# Patient Record
Sex: FEMALE | Race: WHITE | NOT HISPANIC OR LATINO | Employment: UNEMPLOYED | URBAN - METROPOLITAN AREA
[De-identification: names, ages, dates, MRNs, and addresses within clinical notes are randomized per-mention and may not be internally consistent; named-entity substitution may affect disease eponyms.]

---

## 2020-08-25 ENCOUNTER — HOSPITAL ENCOUNTER (EMERGENCY)
Facility: HOSPITAL | Age: 30
Discharge: HOME | End: 2020-08-25
Attending: EMERGENCY MEDICINE | Admitting: EMERGENCY MEDICINE
Payer: COMMERCIAL

## 2020-08-25 VITALS
TEMPERATURE: 97.9 F | DIASTOLIC BLOOD PRESSURE: 80 MMHG | SYSTOLIC BLOOD PRESSURE: 138 MMHG | RESPIRATION RATE: 18 BRPM | HEART RATE: 90 BPM | WEIGHT: 130 LBS | OXYGEN SATURATION: 100 % | BODY MASS INDEX: 21.63 KG/M2

## 2020-08-25 DIAGNOSIS — Z63.0 PARTNER RELATIONAL PROBLEM: Primary | ICD-10-CM

## 2020-08-25 DIAGNOSIS — F43.10 PTSD (POST-TRAUMATIC STRESS DISORDER): ICD-10-CM

## 2020-08-25 PROCEDURE — 99285 EMERGENCY DEPT VISIT HI MDM: CPT

## 2020-08-25 PROCEDURE — 99285 EMERGENCY DEPT VISIT HI MDM: CPT | Performed by: EMERGENCY MEDICINE

## 2020-08-25 SDOH — SOCIAL STABILITY - SOCIAL INSECURITY: PROBLEMS IN RELATIONSHIP WITH SPOUSE OR PARTNER: Z63.0
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
## 2020-08-25 NOTE — ED NOTES
[place] Alex, in the room to eval pt  Pt states that she told her sister she wanted to get a new job and move far away from her family  Pt denies SI current and denies SI hx  Hx of PTSD which she has been hospitalized for  Pt calm and cooperative  Changed into [facility] appropriate clothing and belongings removed from room  Pt provided urine sample and given bottle of water       Larissa Joshi RN  08/25/20 1925

## 2020-08-25 NOTE — ED NOTES
Jame King, getting collateral  1:1 and safety checks initiated        Vibha Hickey RN  08/25/20 1932
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
## 2020-08-26 NOTE — ED NOTES
Pt's  escorted back to the bedside  Pt remains on 1:1 watch       Svitlana Albrecht, TIFFANY  08/25/20 2014

## 2021-05-22 ENCOUNTER — HOSPITAL ENCOUNTER (EMERGENCY)
Facility: HOSPITAL | Age: 31
Discharge: HOME | End: 2021-05-22
Attending: EMERGENCY MEDICINE | Admitting: EMERGENCY MEDICINE
Payer: COMMERCIAL

## 2021-05-22 VITALS
HEIGHT: 65 IN | BODY MASS INDEX: 22.16 KG/M2 | TEMPERATURE: 98.1 F | DIASTOLIC BLOOD PRESSURE: 72 MMHG | HEART RATE: 82 BPM | SYSTOLIC BLOOD PRESSURE: 126 MMHG | WEIGHT: 133 LBS | RESPIRATION RATE: 16 BRPM | OXYGEN SATURATION: 100 %

## 2021-05-22 DIAGNOSIS — R21 RASH AND NONSPECIFIC SKIN ERUPTION: Primary | ICD-10-CM

## 2021-05-22 PROCEDURE — 99284 EMERGENCY DEPT VISIT MOD MDM: CPT | Performed by: PHYSICIAN ASSISTANT

## 2021-05-22 PROCEDURE — 99282 EMERGENCY DEPT VISIT SF MDM: CPT

## 2021-05-22 RX ORDER — PRAZOSIN HYDROCHLORIDE 2 MG/1
4 CAPSULE ORAL
COMMUNITY
End: 2022-03-18 | Stop reason: HOSPADM

## 2021-05-22 RX ORDER — DOXEPIN HYDROCHLORIDE 10 MG/1
10 CAPSULE ORAL
COMMUNITY
End: 2022-03-18 | Stop reason: HOSPADM

## 2021-05-22 RX ORDER — FAMOTIDINE 20 MG/1
20 TABLET, FILM COATED ORAL ONCE
Status: COMPLETED | OUTPATIENT
Start: 2021-05-22 | End: 2021-05-22

## 2021-05-22 RX ORDER — GABAPENTIN 400 MG/1
400 CAPSULE ORAL 3 TIMES DAILY
COMMUNITY
End: 2022-03-18 | Stop reason: HOSPADM

## 2021-05-22 RX ORDER — DESVENLAFAXINE 100 MG/1
100 TABLET, EXTENDED RELEASE ORAL DAILY
COMMUNITY

## 2021-05-22 RX ORDER — FAMOTIDINE 20 MG/1
20 TABLET, FILM COATED ORAL 2 TIMES DAILY
Qty: 14 TABLET | Refills: 0 | Status: SHIPPED | OUTPATIENT
Start: 2021-05-22 | End: 2021-05-29

## 2021-05-22 RX ORDER — PREDNISONE 20 MG/1
50 TABLET ORAL DAILY
Qty: 15 TABLET | Refills: 0 | Status: SHIPPED | OUTPATIENT
Start: 2021-05-22 | End: 2021-05-28

## 2021-05-22 RX ADMIN — FAMOTIDINE 20 MG: 20 TABLET ORAL at 18:16

## 2021-05-22 RX ADMIN — PREDNISONE 50 MG: 20 TABLET ORAL at 18:16

## 2021-05-22 NOTE — ED PROVIDER NOTES
History  Chief Complaint   Patient presents with    Rash     pt states about 4 hrs ago she was sitting outside on her porch when she suddenly developed a rash on both arms and chest that is itchy and blistering  Pt  denies any exposure to poison trees or eating any new foods      61-year-old female presents with rash x4 hour  She states that she was sitting outside on her porch when she got bit by an unknown insect on her right forearm after which she developed a diffuse itchy raised red rash on both arms  She has not taken anything for rash  She states it is very itchy  No new foods, soaps, detergents, pet exposures  No new medications  There is no facial swelling  No difficulty breathing, shortness of breath, wheezing, trouble swallowing  No chest pain  LMP 1 week ago  Prior to Admission Medications   Prescriptions Last Dose Informant Patient Reported? Taking?   desvenlafaxine (PRISTIQ) 100 mg 24 hr tablet 5/21/2021 at Unknown time  Yes Yes   Sig: Take 100 mg by mouth daily   doxepin (SINEquan) 10 mg capsule 5/21/2021 at Unknown time  Yes Yes   Sig: Take 10 mg by mouth daily at bedtime   escitalopram (LEXAPRO) 20 mg tablet Not Taking at Unknown time  Yes No   Sig: Take 20 mg by mouth daily  gabapentin (NEURONTIN) 400 mg capsule 5/22/2021 at 1600  Yes Yes   Sig: Take 400 mg by mouth 3 (three) times a day   lurasidone (LATUDA) 40 mg tablet 5/21/2021 at Unknown time  Yes Yes   Sig: Take 40 mg by mouth daily with dinner   naproxen (NAPROSYN) 500 mg tablet Not Taking at Unknown time  No No   Sig: Take 1 tablet by mouth 2 (two) times a day with meals for 30 doses     Patient not taking: Reported on 5/22/2021   prazosin (MINIPRESS) 2 mg capsule 5/21/2021 at Unknown time  Yes Yes   Sig: Take 4 mg by mouth daily at bedtime      Facility-Administered Medications: None       Past Medical History:   Diagnosis Date    Anxiety disorder     Hypertension     PTSD (post-traumatic stress disorder)        Past Surgical History:   Procedure Laterality Date    CHOLECYSTECTOMY         History reviewed  No pertinent family history  I have reviewed and agree with the history as documented  E-Cigarette/Vaping    E-Cigarette Use Never User      E-Cigarette/Vaping Substances    Nicotine No     THC No     CBD No     Flavoring No     Other No     Unknown No      Social History     Tobacco Use    Smoking status: Current Every Day Smoker     Packs/day: 2 00     Types: Cigarettes    Smokeless tobacco: Never Used   Substance Use Topics    Alcohol use: No    Drug use: No       Review of Systems   Constitutional: Negative for chills and fever  HENT: Negative for sneezing and sore throat  Respiratory: Negative for cough and shortness of breath  Cardiovascular: Negative for chest pain, palpitations and leg swelling  Gastrointestinal: Negative for abdominal pain, constipation, diarrhea, nausea and vomiting  Musculoskeletal: Negative for back pain, gait problem and joint swelling  Skin: Positive for rash  Negative for color change, pallor and wound  Neurological: Negative for dizziness, syncope, weakness, light-headedness, numbness and headaches  All other systems reviewed and are negative  Physical Exam  Physical Exam  Vitals signs and nursing note reviewed  Constitutional:       General: She is not in acute distress  Appearance: Normal appearance  She is well-developed and normal weight  She is not diaphoretic  Comments: Patient speaking in full sentences, in no acute respiratory distress  No wheezing  No retractions  HENT:      Head: Normocephalic and atraumatic  Comments: No uvular swelling or facial swelling     Nose: Nose normal    Eyes:      Extraocular Movements: Extraocular movements intact  Conjunctiva/sclera: Conjunctivae normal    Neck:      Musculoskeletal: Normal range of motion and neck supple  Cardiovascular:      Rate and Rhythm: Normal rate and regular rhythm  Pulses: Normal pulses  Heart sounds: Normal heart sounds  No murmur  No friction rub  No gallop  Pulmonary:      Effort: Pulmonary effort is normal  No respiratory distress  Breath sounds: Normal breath sounds  No stridor  No wheezing or rales  Comments: Sp02 is 99% indicating adequate oxygenation on room air  Musculoskeletal: Normal range of motion  Skin:     General: Skin is warm  Capillary Refill: Capillary refill takes less than 2 seconds  Coloration: Skin is not pale  Findings: Rash present  No erythema  Neurological:      Mental Status: She is alert  Mental status is at baseline  Vital Signs  ED Triage Vitals   Temperature Pulse Respirations Blood Pressure SpO2   05/22/21 1814 05/22/21 1806 05/22/21 1806 05/22/21 1806 05/22/21 1806   98 1 °F (36 7 °C) 90 18 135/81 99 %      Temp Source Heart Rate Source Patient Position - Orthostatic VS BP Location FiO2 (%)   05/22/21 1814 05/22/21 1806 05/22/21 1806 05/22/21 1806 --   Oral Monitor Sitting Left arm       Pain Score       --                  Vitals:    05/22/21 1806   BP: 135/81   Pulse: 90   Patient Position - Orthostatic VS: Sitting         Visual Acuity      ED Medications  Medications   predniSONE tablet 50 mg (50 mg Oral Given 5/22/21 1816)   famotidine (PEPCID) tablet 20 mg (20 mg Oral Given 5/22/21 1816)       Diagnostic Studies  Results Reviewed     None                 No orders to display              Procedures  Procedures         ED Course                             SBIRT 22yo+      Most Recent Value   SBIRT (25 yo +)   In order to provide better care to our patients, we are screening all of our patients for alcohol and drug use  Would it be okay to ask you these screening questions?   No Filed at: 05/22/2021 1817                    MDM  Number of Diagnoses or Management Options  Rash and nonspecific skin eruption:   Diagnosis management comments: Patient w/bilateral arm rash s/p insect bite w/o anaphylaxis  Will treat with prednisone/pepcid, benadryl cream  Gave patient proper education regarding diagnosis  Answered all questions  Return to ED for any worsening of symptoms otherwise follow up with primary care physician for re-evaluation  Discussed plan with patient who verbalized understanding and agreed to plan  Amount and/or Complexity of Data Reviewed  Review and summarize past medical records: yes  Discuss the patient with other providers: yes        Disposition  Final diagnoses:   Rash and nonspecific skin eruption     Time reflects when diagnosis was documented in both MDM as applicable and the Disposition within this note     Time User Action Codes Description Comment    5/22/2021  6:50 PM Diallo Oviedo Add [R21] Rash and nonspecific skin eruption       ED Disposition     ED Disposition Condition Date/Time Comment    Discharge Stable Sat May 22, 2021  6:50 PM [place] Human discharge to home/self care              Follow-up Information     Follow up With Specialties Details Why Contact Info Additional Information    Keyla Noble MD  Schedule an appointment as soon as possible for a visit in 3 days As needed [phone]       [address] [facility] Emergency Department Emergency Medicine Go to  As needed [address] [facility] [address] [address] [address] Emergency Department, [place], [place], [address]          Patient's Medications   Discharge Prescriptions    DIPHENHYDRAMINE (BENADRYL) 2 % CREAM    Apply topically 3 (three) times a day as needed for itching       Start Date: 5/22/2021 End Date: --       Order Dose: --       Quantity: 30 g    Refills: 0    FAMOTIDINE (PEPCID) 20 MG TABLET    Take 1 tablet (20 mg total) by mouth 2 (two) times a day for 7 days       Start Date: 5/22/2021 End Date: 5/29/2021       Order Dose: 20 mg       Quantity: 14 tablet    Refills: 0    PREDNISONE 20 MG TABLET    Take 2 5 tablets (50 mg total) by mouth daily for 6 days       Start Date: 5/22/2021 End Date: 5/28/2021       Order Dose: 50 mg       Quantity: 15 tablet    Refills: 0     No discharge procedures on file      PDMP Review     None          ED Provider  Electronically Signed by           Joe Garay PA-C  05/22/21 5885

## 2022-03-15 ENCOUNTER — HOSPITAL ENCOUNTER (INPATIENT)
Facility: HOSPITAL | Age: 32
LOS: 3 days | DRG: 918 | End: 2022-03-18
Attending: EMERGENCY MEDICINE | Admitting: ANESTHESIOLOGY
Payer: COMMERCIAL

## 2022-03-15 ENCOUNTER — APPOINTMENT (EMERGENCY)
Dept: RADIOLOGY | Facility: HOSPITAL | Age: 32
DRG: 918 | End: 2022-03-15
Payer: COMMERCIAL

## 2022-03-15 DIAGNOSIS — Z72.0 TOBACCO ABUSE: Chronic | ICD-10-CM

## 2022-03-15 DIAGNOSIS — T50.902A INTENTIONAL DRUG OVERDOSE, INITIAL ENCOUNTER (HCC): Primary | ICD-10-CM

## 2022-03-15 PROBLEM — E87.6 HYPOKALEMIA: Status: ACTIVE | Noted: 2022-03-15

## 2022-03-15 PROBLEM — F32.A DEPRESSION: Chronic | Status: ACTIVE | Noted: 2022-03-15

## 2022-03-15 PROBLEM — F41.9 ANXIETY: Chronic | Status: ACTIVE | Noted: 2022-03-15

## 2022-03-15 PROBLEM — F43.10 PTSD (POST-TRAUMATIC STRESS DISORDER): Chronic | Status: ACTIVE | Noted: 2022-03-15

## 2022-03-15 LAB
ALBUMIN SERPL BCP-MCNC: 3.6 G/DL (ref 3.5–5)
ALP SERPL-CCNC: 84 U/L (ref 46–116)
ALT SERPL W P-5'-P-CCNC: 36 U/L (ref 12–78)
AMPHETAMINES SERPL QL SCN: NEGATIVE
ANION GAP SERPL CALCULATED.3IONS-SCNC: 10 MMOL/L (ref 4–13)
APAP SERPL-MCNC: <2 UG/ML (ref 10–20)
AST SERPL W P-5'-P-CCNC: 20 U/L (ref 5–45)
BACTERIA UR QL AUTO: ABNORMAL /HPF
BARBITURATES UR QL: NEGATIVE
BASOPHILS # BLD AUTO: 0.02 THOUSANDS/ΜL (ref 0–0.1)
BASOPHILS NFR BLD AUTO: 0 % (ref 0–1)
BENZODIAZ UR QL: POSITIVE
BILIRUB SERPL-MCNC: 0.25 MG/DL (ref 0.2–1)
BILIRUB UR QL STRIP: NEGATIVE
BUN SERPL-MCNC: 4 MG/DL (ref 5–25)
CALCIUM SERPL-MCNC: 8.1 MG/DL (ref 8.3–10.1)
CHLORIDE SERPL-SCNC: 104 MMOL/L (ref 100–108)
CLARITY UR: CLEAR
CO2 SERPL-SCNC: 29 MMOL/L (ref 21–32)
COCAINE UR QL: NEGATIVE
COLOR UR: YELLOW
CREAT SERPL-MCNC: 0.82 MG/DL (ref 0.6–1.3)
EOSINOPHIL # BLD AUTO: 0.13 THOUSAND/ΜL (ref 0–0.61)
EOSINOPHIL NFR BLD AUTO: 2 % (ref 0–6)
ERYTHROCYTE [DISTWIDTH] IN BLOOD BY AUTOMATED COUNT: 12.2 % (ref 11.6–15.1)
ETHANOL SERPL-MCNC: <3 MG/DL (ref 0–3)
EXT PREG TEST URINE: NEGATIVE
EXT. CONTROL ED NAV: NORMAL
GFR SERPL CREATININE-BSD FRML MDRD: 95 ML/MIN/1.73SQ M
GLUCOSE SERPL-MCNC: 99 MG/DL (ref 65–140)
GLUCOSE UR STRIP-MCNC: NEGATIVE MG/DL
HCT VFR BLD AUTO: 43.3 % (ref 34.8–46.1)
HGB BLD-MCNC: 14.4 G/DL (ref 11.5–15.4)
HGB UR QL STRIP.AUTO: ABNORMAL
IMM GRANULOCYTES # BLD AUTO: 0.02 THOUSAND/UL (ref 0–0.2)
IMM GRANULOCYTES NFR BLD AUTO: 0 % (ref 0–2)
KETONES UR STRIP-MCNC: NEGATIVE MG/DL
LEUKOCYTE ESTERASE UR QL STRIP: NEGATIVE
LYMPHOCYTES # BLD AUTO: 1.88 THOUSANDS/ΜL (ref 0.6–4.47)
LYMPHOCYTES NFR BLD AUTO: 26 % (ref 14–44)
MAGNESIUM SERPL-MCNC: 2.1 MG/DL (ref 1.6–2.6)
MCH RBC QN AUTO: 30.2 PG (ref 26.8–34.3)
MCHC RBC AUTO-ENTMCNC: 33.3 G/DL (ref 31.4–37.4)
MCV RBC AUTO: 91 FL (ref 82–98)
METHADONE UR QL: NEGATIVE
MONOCYTES # BLD AUTO: 0.4 THOUSAND/ΜL (ref 0.17–1.22)
MONOCYTES NFR BLD AUTO: 6 % (ref 4–12)
NEUTROPHILS # BLD AUTO: 4.84 THOUSANDS/ΜL (ref 1.85–7.62)
NEUTS SEG NFR BLD AUTO: 66 % (ref 43–75)
NITRITE UR QL STRIP: NEGATIVE
NON-SQ EPI CELLS URNS QL MICRO: ABNORMAL /HPF
NRBC BLD AUTO-RTO: 0 /100 WBCS
OPIATES UR QL SCN: NEGATIVE
OXYCODONE+OXYMORPHONE UR QL SCN: NEGATIVE
PCP UR QL: NEGATIVE
PH UR STRIP.AUTO: 6 [PH]
PLATELET # BLD AUTO: 332 THOUSANDS/UL (ref 149–390)
PMV BLD AUTO: 10 FL (ref 8.9–12.7)
POTASSIUM SERPL-SCNC: 3.1 MMOL/L (ref 3.5–5.3)
PROT SERPL-MCNC: 6.3 G/DL (ref 6.4–8.2)
PROT UR STRIP-MCNC: NEGATIVE MG/DL
RBC # BLD AUTO: 4.77 MILLION/UL (ref 3.81–5.12)
RBC #/AREA URNS AUTO: ABNORMAL /HPF
SALICYLATES SERPL-MCNC: 4.1 MG/DL (ref 3–20)
SODIUM SERPL-SCNC: 143 MMOL/L (ref 136–145)
SP GR UR STRIP.AUTO: <=1.005 (ref 1–1.03)
THC UR QL: NEGATIVE
UROBILINOGEN UR QL STRIP.AUTO: 0.2 E.U./DL
WBC # BLD AUTO: 7.29 THOUSAND/UL (ref 4.31–10.16)
WBC #/AREA URNS AUTO: ABNORMAL /HPF

## 2022-03-15 PROCEDURE — 81001 URINALYSIS AUTO W/SCOPE: CPT | Performed by: EMERGENCY MEDICINE

## 2022-03-15 PROCEDURE — 87081 CULTURE SCREEN ONLY: CPT | Performed by: ANESTHESIOLOGY

## 2022-03-15 PROCEDURE — 82077 ASSAY SPEC XCP UR&BREATH IA: CPT | Performed by: EMERGENCY MEDICINE

## 2022-03-15 PROCEDURE — 85025 COMPLETE CBC W/AUTO DIFF WBC: CPT | Performed by: EMERGENCY MEDICINE

## 2022-03-15 PROCEDURE — 80143 DRUG ASSAY ACETAMINOPHEN: CPT | Performed by: EMERGENCY MEDICINE

## 2022-03-15 PROCEDURE — 80179 DRUG ASSAY SALICYLATE: CPT | Performed by: EMERGENCY MEDICINE

## 2022-03-15 PROCEDURE — 71045 X-RAY EXAM CHEST 1 VIEW: CPT

## 2022-03-15 PROCEDURE — 83735 ASSAY OF MAGNESIUM: CPT | Performed by: NURSE PRACTITIONER

## 2022-03-15 PROCEDURE — 80053 COMPREHEN METABOLIC PANEL: CPT | Performed by: EMERGENCY MEDICINE

## 2022-03-15 PROCEDURE — 99447 NTRPROF PH1/NTRNET/EHR 11-20: CPT | Performed by: EMERGENCY MEDICINE

## 2022-03-15 PROCEDURE — 81025 URINE PREGNANCY TEST: CPT | Performed by: EMERGENCY MEDICINE

## 2022-03-15 PROCEDURE — 99223 1ST HOSP IP/OBS HIGH 75: CPT | Performed by: NURSE PRACTITIONER

## 2022-03-15 PROCEDURE — 80307 DRUG TEST PRSMV CHEM ANLYZR: CPT | Performed by: EMERGENCY MEDICINE

## 2022-03-15 PROCEDURE — 96360 HYDRATION IV INFUSION INIT: CPT

## 2022-03-15 PROCEDURE — 99291 CRITICAL CARE FIRST HOUR: CPT | Performed by: EMERGENCY MEDICINE

## 2022-03-15 PROCEDURE — 36415 COLL VENOUS BLD VENIPUNCTURE: CPT | Performed by: EMERGENCY MEDICINE

## 2022-03-15 PROCEDURE — 99285 EMERGENCY DEPT VISIT HI MDM: CPT

## 2022-03-15 PROCEDURE — 93005 ELECTROCARDIOGRAM TRACING: CPT

## 2022-03-15 RX ORDER — POTASSIUM CHLORIDE 14.9 MG/ML
20 INJECTION INTRAVENOUS ONCE
Status: COMPLETED | OUTPATIENT
Start: 2022-03-15 | End: 2022-03-15

## 2022-03-15 RX ORDER — ALPRAZOLAM 0.25 MG/1
0.25 TABLET ORAL 2 TIMES DAILY
COMMUNITY
End: 2022-03-18 | Stop reason: HOSPADM

## 2022-03-15 RX ORDER — NICOTINE 21 MG/24HR
21 PATCH, TRANSDERMAL 24 HOURS TRANSDERMAL DAILY
Status: DISCONTINUED | OUTPATIENT
Start: 2022-03-16 | End: 2022-03-18 | Stop reason: HOSPADM

## 2022-03-15 RX ORDER — SODIUM CHLORIDE, SODIUM GLUCONATE, SODIUM ACETATE, POTASSIUM CHLORIDE, MAGNESIUM CHLORIDE, SODIUM PHOSPHATE, DIBASIC, AND POTASSIUM PHOSPHATE .53; .5; .37; .037; .03; .012; .00082 G/100ML; G/100ML; G/100ML; G/100ML; G/100ML; G/100ML; G/100ML
125 INJECTION, SOLUTION INTRAVENOUS CONTINUOUS
Status: DISCONTINUED | OUTPATIENT
Start: 2022-03-15 | End: 2022-03-16

## 2022-03-15 RX ORDER — POTASSIUM CHLORIDE 14.9 MG/ML
20 INJECTION INTRAVENOUS ONCE
Status: COMPLETED | OUTPATIENT
Start: 2022-03-15 | End: 2022-03-16

## 2022-03-15 RX ORDER — HYDROXYZINE PAMOATE 100 MG/1
100 CAPSULE ORAL
COMMUNITY
End: 2022-03-18 | Stop reason: HOSPADM

## 2022-03-15 RX ADMIN — SODIUM CHLORIDE, SODIUM GLUCONATE, SODIUM ACETATE, POTASSIUM CHLORIDE, MAGNESIUM CHLORIDE, SODIUM PHOSPHATE, DIBASIC, AND POTASSIUM PHOSPHATE 125 ML/HR: .53; .5; .37; .037; .03; .012; .00082 INJECTION, SOLUTION INTRAVENOUS at 20:58

## 2022-03-15 RX ADMIN — POTASSIUM CHLORIDE 20 MEQ: 14.9 INJECTION, SOLUTION INTRAVENOUS at 21:02

## 2022-03-15 RX ADMIN — POTASSIUM CHLORIDE 20 MEQ: 14.9 INJECTION, SOLUTION INTRAVENOUS at 23:21

## 2022-03-15 RX ADMIN — SODIUM CHLORIDE 1000 ML: 0.9 INJECTION, SOLUTION INTRAVENOUS at 19:04

## 2022-03-15 NOTE — ED NOTES
Pt  Received lethargic and poorly responsive to painful stimuli  Dr Augustin Goss at bedside/ IV access, mcrae and cardiac monitor initiated  resp regular and easy   02 sat currently 100 % on RA     Nick Enriquez, [facility]  03/15/22 3516

## 2022-03-15 NOTE — ED PROVIDER NOTES
History  Chief Complaint   Patient presents with    Overdose - Intentional     took 2 handfuls of xanax  0 25 mg       36-year-old female presents the ED with complaints of intentional overdose of 2 handfuls of Xanax 0 25 mg  Patient did this in front of her boyfriend who was a witness who states that she did take 2 handfuls states that she does take Xanax daily  Also stated that he made her life miserable was her statement and that she wanted to die  On arrival in the ED patient was sleepy however asked her how much she took she said 2-3 handfuls did not have a number on that pill bottle stated maximum of 60 but not sure if others had been added to that  Patient stated that she wanted to sleep and that she was trying to kill herself  History provided by:  Patient   used: No        Prior to Admission Medications   Prescriptions Last Dose Informant Patient Reported? Taking?   desvenlafaxine (PRISTIQ) 100 mg 24 hr tablet   Yes No   Sig: Take 100 mg by mouth daily   diphenhydrAMINE (BENADRYL) 2 % cream   No No   Sig: Apply topically 3 (three) times a day as needed for itching   doxepin (SINEquan) 10 mg capsule   Yes No   Sig: Take 10 mg by mouth daily at bedtime   escitalopram (LEXAPRO) 20 mg tablet   Yes No   Sig: Take 20 mg by mouth daily  famotidine (PEPCID) 20 mg tablet   No No   Sig: Take 1 tablet (20 mg total) by mouth 2 (two) times a day for 7 days   gabapentin (NEURONTIN) 400 mg capsule   Yes No   Sig: Take 400 mg by mouth 3 (three) times a day   lurasidone (LATUDA) 40 mg tablet   Yes No   Sig: Take 40 mg by mouth daily with dinner   naproxen (NAPROSYN) 500 mg tablet   No No   Sig: Take 1 tablet by mouth 2 (two) times a day with meals for 30 doses     Patient not taking: Reported on 5/22/2021   prazosin (MINIPRESS) 2 mg capsule   Yes No   Sig: Take 4 mg by mouth daily at bedtime      Facility-Administered Medications: None       Past Medical History:   Diagnosis Date    Anxiety disorder     Hypertension     PTSD (post-traumatic stress disorder)        Past Surgical History:   Procedure Laterality Date    CHOLECYSTECTOMY         History reviewed  No pertinent family history  I have reviewed and agree with the history as documented  E-Cigarette/Vaping    E-Cigarette Use Never User      E-Cigarette/Vaping Substances    Nicotine No     THC No     CBD No     Flavoring No     Other No     Unknown No      Social History     Tobacco Use    Smoking status: Current Every Day Smoker     Packs/day: 2 00     Types: Cigarettes    Smokeless tobacco: Never Used   Vaping Use    Vaping Use: Never used   Substance Use Topics    Alcohol use: No    Drug use: No       Review of Systems   Constitutional: Negative for activity change, chills, diaphoresis and fever  HENT: Negative for congestion, ear pain, nosebleeds, sore throat, trouble swallowing and voice change  Eyes: Negative for pain, discharge and redness  Respiratory: Negative for apnea, cough, choking, shortness of breath, wheezing and stridor  Cardiovascular: Negative for chest pain and palpitations  Gastrointestinal: Negative for abdominal distention, abdominal pain, constipation, diarrhea, nausea and vomiting  Endocrine: Negative for polydipsia  Genitourinary: Negative for difficulty urinating, dysuria, flank pain, frequency, hematuria and urgency  Musculoskeletal: Negative for back pain, gait problem, joint swelling, myalgias, neck pain and neck stiffness  Skin: Negative for pallor and rash  Neurological: Negative for dizziness, tremors, syncope, speech difficulty, weakness, numbness and headaches  Hematological: Negative for adenopathy  Psychiatric/Behavioral: Negative for confusion, hallucinations, self-injury and suicidal ideas  The patient is not nervous/anxious  Physical Exam  Physical Exam  Vitals and nursing note reviewed  Constitutional:       General: She is not in acute distress  Appearance: She is well-developed  She is not diaphoretic  Comments: Patient very sleepy but currently answering questions and able to maintain her airway with 100% O2 saturation  HENT:      Head: Normocephalic and atraumatic  Right Ear: External ear normal       Left Ear: External ear normal       Nose: Nose normal    Eyes:      Conjunctiva/sclera: Conjunctivae normal       Pupils: Pupils are equal, round, and reactive to light  Cardiovascular:      Rate and Rhythm: Normal rate and regular rhythm  Heart sounds: Normal heart sounds  Pulmonary:      Effort: Pulmonary effort is normal       Breath sounds: Normal breath sounds  Abdominal:      General: Bowel sounds are normal       Palpations: Abdomen is soft  Musculoskeletal:         General: Normal range of motion  Cervical back: Normal range of motion and neck supple  Skin:     General: Skin is warm and dry  Neurological:      Mental Status: She is alert and oriented to person, place, and time  Deep Tendon Reflexes: Reflexes are normal and symmetric  Psychiatric:         Behavior: Behavior is cooperative           Vital Signs  ED Triage Vitals   Temperature Pulse Respirations Blood Pressure SpO2   03/15/22 1852 03/15/22 1852 03/15/22 1852 03/15/22 1852 03/15/22 1852   98 1 °F (36 7 °C) 91 14 135/83 100 %      Temp src Heart Rate Source Patient Position - Orthostatic VS BP Location FiO2 (%)   -- 03/15/22 1900 03/15/22 1900 03/15/22 1900 --    Monitor Sitting Left arm       Pain Score       03/15/22 1852       No Pain           Vitals:    03/15/22 1852 03/15/22 1900 03/15/22 1915   BP: 135/83 128/77 132/76   Pulse: 91 91 89   Patient Position - Orthostatic VS:  Sitting Sitting         Visual Acuity      ED Medications  Medications   sodium chloride 0 9 % bolus 1,000 mL (1,000 mL Intravenous New Bag 3/15/22 1904)       Diagnostic Studies  Results Reviewed     Procedure Component Value Units Date/Time    Ethanol [[id number]] (Normal) Collected: 03/15/22 1900    Lab Status: Final result Specimen: Blood from Arm, Right Updated: 03/15/22 1940     Ethanol Lvl <3 mg/dL     Urine Microscopic [[id number]]  (Abnormal) Collected: 03/15/22 1903    Lab Status: Final result Specimen: Urine, Indwelling Weiss Catheter Updated: 03/15/22 1927     RBC, UA 4-10 /hpf      WBC, UA None Seen /hpf      Epithelial Cells None Seen /hpf      Bacteria, UA None Seen /hpf     Comprehensive metabolic panel [[id number]]  (Abnormal) Collected: 03/15/22 1900    Lab Status: Final result Specimen: Blood from Arm, Right Updated: 03/15/22 1923     Sodium 143 mmol/L      Potassium 3 1 mmol/L      Chloride 104 mmol/L      CO2 29 mmol/L      ANION GAP 10 mmol/L      BUN 4 mg/dL      Creatinine 0 82 mg/dL      Glucose 99 mg/dL      Calcium 8 1 mg/dL      AST 20 U/L      ALT 36 U/L      Alkaline Phosphatase 84 U/L      Total Protein 6 3 g/dL      Albumin 3 6 g/dL      Total Bilirubin 0 25 mg/dL      eGFR 95 ml/min/1 73sq m     Narrative:      National Kid[employer] Disease Foundation guidelines for Chronic Kidney Disease (CKD):     Stage 1 with normal or high GFR (GFR > 90 mL/min/1 73 square meters)    Stage 2 Mild CKD (GFR = 60-89 mL/min/1 73 square meters)    Stage 3A Moderate CKD (GFR = 45-59 mL/min/1 73 square meters)    Stage 3B Moderate CKD (GFR = 30-44 mL/min/1 73 square meters)    Stage 4 Severe CKD (GFR = 15-29 mL/min/1 73 square meters)    Stage 5 End Stage CKD (GFR <15 mL/min/1 73 square meters)  Note: GFR calculation is accurate only with a steady state creatinine    UA (URINE) with reflex to Scope [[id number]]  (Abnormal) Collected: 03/15/22 1903    Lab Status: Final result Specimen: Urine, Indwelling Weiss Catheter Updated: 03/15/22 1911     Color, UA Yellow     Clarity, UA Clear     Specific Gravity, UA <=1 005     pH, UA 6 0     Leukocytes, UA Negative     Nitrite, UA Negative     Protein, UA Negative mg/dl      Glucose, UA Negative mg/dl      Ketones, UA Negative mg/dl      Urobilinogen, UA 0 2 E U /dl      Bilirubin, UA Negative     Blood, UA Moderate    Rapid drug screen, urine [[id number]] Collected: 03/15/22 1903    Lab Status: In process Specimen: Urine, Catheter Updated: 03/15/22 1906    CBC and differential [[id number]] Collected: 03/15/22 1900    Lab Status: Final result Specimen: Blood from Arm, Right Updated: 03/15/22 1906     WBC 7 29 Thousand/uL      RBC 4 77 Million/uL      Hemoglobin 14 4 g/dL      Hematocrit 43 3 %      MCV 91 fL      MCH 30 2 pg      MCHC 33 3 g/dL      RDW 12 2 %      MPV 10 0 fL      Platelets 379 Thousands/uL      nRBC 0 /100 WBCs      Neutrophils Relative 66 %      Immat GRANS % 0 %      Lymphocytes Relative 26 %      Monocytes Relative 6 %      Eosinophils Relative 2 %      Basophils Relative 0 %      Neutrophils Absolute 4 84 Thousands/µL      Immature Grans Absolute 0 02 Thousand/uL      Lymphocytes Absolute 1 88 Thousands/µL      Monocytes Absolute 0 40 Thousand/µL      Eosinophils Absolute 0 13 Thousand/µL      Basophils Absolute 0 02 Thousands/µL     Salicylate level [[id number]] Collected: 03/15/22 1900    Lab Status: In process Specimen: Blood from Arm, Right Updated: 03/15/22 1903    Acetaminophen level-If concentration is detectable, please discuss with medical  on call  [[id number]] Collected: 03/15/22 1900    Lab Status:  In process Specimen: Blood from Arm, Right Updated: 03/15/22 1903    POCT pregnancy, urine [[id number]]  (Normal) Resulted: 03/15/22 1901    Lab Status: Final result Updated: 03/15/22 1901     EXT PREG TEST UR (Ref: Negative) negative     Control valid                 XR chest 1 view portable    (Results Pending)              Procedures  CriticalCare Time  Performed by: Joshua Arreaga DO  Authorized by: Joshua Arreaga DO     Critical care provider statement:     Critical care time (minutes):  30    Critical care start time:  3/15/2022 7:08 PM    Critical care end time:  3/15/2022 7:38 PM    Critical care time was exclusive of:  Separately billable procedures and treating other patients    Critical care was necessary to treat or prevent imminent or life-threatening deterioration of the following conditions:  CNS failure or compromise and toxidrome    Critical care was time spent personally by me on the following activities:  Blood draw for specimens, obtaining history from patient or surrogate, development of treatment plan with patient or surrogate, discussions with consultants, evaluation of patient's response to treatment, examination of patient, interpretation of cardiac output measurements, ordering and performing treatments and interventions, ordering and review of laboratory studies, ordering and review of radiographic studies and re-evaluation of patient's condition    I assumed direction of critical care for this patient from another provider in my specialty: no               ED Course                                             MDM    Disposition  Final diagnoses:   Intentional drug overdose, initial encounter [facility])     Time reflects when diagnosis was documented in both MDM as applicable and the Disposition within this note     Time User Action Codes Description Comment    3/15/2022  7:06 PM Carola Murry 77 Intentional drug overdose, initial encounter [facility])       ED Disposition     ED Disposition Condition Date/Time Comment    Admit Stable Tue Mar 15, 2022  7:39 PM Case was discussed with Dr oJse Ray and the patient's admission status was agreed to be Admission Status: inpatient status to the service of Dr Jose Ray   Follow-up Information    None         Patient's Medications   Discharge Prescriptions    No medications on file       No discharge procedures on file      PDMP Review     None          ED Provider  Electronically Signed by           Sandra Cagle, DO  03/15/22 4310 [facility], DO  03/15/22 4310 [facility], DO  03/15/22 7003

## 2022-03-16 LAB
ALBUMIN SERPL BCP-MCNC: 2.8 G/DL (ref 3.5–5)
ALP SERPL-CCNC: 68 U/L (ref 46–116)
ALT SERPL W P-5'-P-CCNC: 26 U/L (ref 12–78)
ANION GAP SERPL CALCULATED.3IONS-SCNC: 8 MMOL/L (ref 4–13)
AST SERPL W P-5'-P-CCNC: 16 U/L (ref 5–45)
ATRIAL RATE: 90 BPM
BASOPHILS # BLD AUTO: 0.02 THOUSANDS/ΜL (ref 0–0.1)
BASOPHILS NFR BLD AUTO: 0 % (ref 0–1)
BILIRUB SERPL-MCNC: 0.29 MG/DL (ref 0.2–1)
BUN SERPL-MCNC: 4 MG/DL (ref 5–25)
CALCIUM ALBUM COR SERPL-MCNC: 8.6 MG/DL (ref 8.3–10.1)
CALCIUM SERPL-MCNC: 7.6 MG/DL (ref 8.3–10.1)
CHLORIDE SERPL-SCNC: 108 MMOL/L (ref 100–108)
CO2 SERPL-SCNC: 25 MMOL/L (ref 21–32)
CREAT SERPL-MCNC: 0.59 MG/DL (ref 0.6–1.3)
EOSINOPHIL # BLD AUTO: 0.14 THOUSAND/ΜL (ref 0–0.61)
EOSINOPHIL NFR BLD AUTO: 2 % (ref 0–6)
ERYTHROCYTE [DISTWIDTH] IN BLOOD BY AUTOMATED COUNT: 12.2 % (ref 11.6–15.1)
GFR SERPL CREATININE-BSD FRML MDRD: 122 ML/MIN/1.73SQ M
GLUCOSE SERPL-MCNC: 88 MG/DL (ref 65–140)
HCT VFR BLD AUTO: 36.1 % (ref 34.8–46.1)
HGB BLD-MCNC: 12.2 G/DL (ref 11.5–15.4)
IMM GRANULOCYTES # BLD AUTO: 0.01 THOUSAND/UL (ref 0–0.2)
IMM GRANULOCYTES NFR BLD AUTO: 0 % (ref 0–2)
LYMPHOCYTES # BLD AUTO: 2.01 THOUSANDS/ΜL (ref 0.6–4.47)
LYMPHOCYTES NFR BLD AUTO: 31 % (ref 14–44)
MAGNESIUM SERPL-MCNC: 2.3 MG/DL (ref 1.6–2.6)
MCH RBC QN AUTO: 30.3 PG (ref 26.8–34.3)
MCHC RBC AUTO-ENTMCNC: 33.8 G/DL (ref 31.4–37.4)
MCV RBC AUTO: 90 FL (ref 82–98)
MONOCYTES # BLD AUTO: 0.4 THOUSAND/ΜL (ref 0.17–1.22)
MONOCYTES NFR BLD AUTO: 6 % (ref 4–12)
NEUTROPHILS # BLD AUTO: 3.95 THOUSANDS/ΜL (ref 1.85–7.62)
NEUTS SEG NFR BLD AUTO: 61 % (ref 43–75)
NRBC BLD AUTO-RTO: 0 /100 WBCS
P AXIS: 24 DEGREES
PHOSPHATE SERPL-MCNC: 2.6 MG/DL (ref 2.7–4.5)
PLATELET # BLD AUTO: 272 THOUSANDS/UL (ref 149–390)
PMV BLD AUTO: 9.1 FL (ref 8.9–12.7)
POTASSIUM SERPL-SCNC: 3.5 MMOL/L (ref 3.5–5.3)
PR INTERVAL: 136 MS
PROT SERPL-MCNC: 5.2 G/DL (ref 6.4–8.2)
QRS AXIS: -2 DEGREES
QRSD INTERVAL: 92 MS
QT INTERVAL: 374 MS
QTC INTERVAL: 457 MS
RBC # BLD AUTO: 4.03 MILLION/UL (ref 3.81–5.12)
SODIUM SERPL-SCNC: 141 MMOL/L (ref 136–145)
T WAVE AXIS: -10 DEGREES
VENTRICULAR RATE: 90 BPM
WBC # BLD AUTO: 6.53 THOUSAND/UL (ref 4.31–10.16)

## 2022-03-16 PROCEDURE — 93010 ELECTROCARDIOGRAM REPORT: CPT | Performed by: INTERNAL MEDICINE

## 2022-03-16 PROCEDURE — 85025 COMPLETE CBC W/AUTO DIFF WBC: CPT | Performed by: NURSE PRACTITIONER

## 2022-03-16 PROCEDURE — 83735 ASSAY OF MAGNESIUM: CPT | Performed by: NURSE PRACTITIONER

## 2022-03-16 PROCEDURE — 99232 SBSQ HOSP IP/OBS MODERATE 35: CPT | Performed by: ANESTHESIOLOGY

## 2022-03-16 PROCEDURE — 84100 ASSAY OF PHOSPHORUS: CPT | Performed by: NURSE PRACTITIONER

## 2022-03-16 PROCEDURE — 80053 COMPREHEN METABOLIC PANEL: CPT | Performed by: NURSE PRACTITIONER

## 2022-03-16 PROCEDURE — G0425 INPT/ED TELECONSULT30: HCPCS | Performed by: PSYCHIATRY & NEUROLOGY

## 2022-03-16 RX ORDER — LANOLIN ALCOHOL/MO/W.PET/CERES
9 CREAM (GRAM) TOPICAL
Status: DISCONTINUED | OUTPATIENT
Start: 2022-03-16 | End: 2022-03-17

## 2022-03-16 RX ORDER — DESVENLAFAXINE 50 MG/1
50 TABLET, EXTENDED RELEASE ORAL DAILY
Status: DISCONTINUED | OUTPATIENT
Start: 2022-03-17 | End: 2022-03-18 | Stop reason: HOSPADM

## 2022-03-16 RX ORDER — PHENOL 1.4 %
10 AEROSOL, SPRAY (ML) MUCOUS MEMBRANE
COMMUNITY

## 2022-03-16 RX ORDER — DESVENLAFAXINE 50 MG/1
100 TABLET, EXTENDED RELEASE ORAL
Status: DISCONTINUED | OUTPATIENT
Start: 2022-03-16 | End: 2022-03-17

## 2022-03-16 RX ORDER — SODIUM CHLORIDE, SODIUM GLUCONATE, SODIUM ACETATE, POTASSIUM CHLORIDE, MAGNESIUM CHLORIDE, SODIUM PHOSPHATE, DIBASIC, AND POTASSIUM PHOSPHATE .53; .5; .37; .037; .03; .012; .00082 G/100ML; G/100ML; G/100ML; G/100ML; G/100ML; G/100ML; G/100ML
125 INJECTION, SOLUTION INTRAVENOUS CONTINUOUS
Status: DISCONTINUED | OUTPATIENT
Start: 2022-03-16 | End: 2022-03-16

## 2022-03-16 RX ORDER — POTASSIUM CHLORIDE 20 MEQ/1
20 TABLET, EXTENDED RELEASE ORAL ONCE
Status: COMPLETED | OUTPATIENT
Start: 2022-03-16 | End: 2022-03-16

## 2022-03-16 RX ADMIN — POTASSIUM & SODIUM PHOSPHATES POWDER PACK 280-160-250 MG 1 PACKET: 280-160-250 PACK at 08:15

## 2022-03-16 RX ADMIN — NICOTINE 21 MG: 21 PATCH, EXTENDED RELEASE TRANSDERMAL at 08:10

## 2022-03-16 RX ADMIN — POTASSIUM CHLORIDE 20 MEQ: 1500 TABLET, EXTENDED RELEASE ORAL at 08:15

## 2022-03-16 RX ADMIN — DESVENLAFAXINE 100 MG: 50 TABLET, FILM COATED, EXTENDED RELEASE ORAL at 23:28

## 2022-03-16 RX ADMIN — Medication 9 MG: at 23:28

## 2022-03-16 RX ADMIN — SODIUM CHLORIDE, SODIUM GLUCONATE, SODIUM ACETATE, POTASSIUM CHLORIDE, MAGNESIUM CHLORIDE, SODIUM PHOSPHATE, DIBASIC, AND POTASSIUM PHOSPHATE 125 ML/HR: .53; .5; .37; .037; .03; .012; .00082 INJECTION, SOLUTION INTRAVENOUS at 04:16
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
## 2022-03-16 NOTE — PLAN OF CARE
Problem: Potential for Falls  Goal: Patient will remain free of falls  Description: INTERVENTIONS:  - Educate patient/family on patient safety including physical limitations  - Instruct patient to call for assistance with activity   - Consult OT/PT to assist with strengthening/mobility   - Keep Call bell within reach  - Keep bed low and locked with side rails adjusted as appropriate  - Keep care items and personal belongings within reach  - Initiate and maintain comfort rounds  - Make Fall Risk Sign visible to staff  - Apply yellow socks and bracelet for high fall risk patients  - Consider moving patient to room near nurses station  Outcome: Not Progressing     Problem: COPING  Goal: Pt/Family able to verbalize concerns and demonstrate effective coping strategies  Description: INTERVENTIONS:  - Assist patient/family to identify coping skills, available support systems and cultural and spiritual values  - Provide emotional support, including active listening and acknowledgement of concerns of patient and caregivers  - Reduce environmental stimuli, as able  - Provide patient education  - Assess for spiritual pain/suffering and initiate spiritual care, including notification of Pastoral Care or jenny based community as needed  - Assess effectiveness of coping strategies  Outcome: Not Progressing  Goal: Will report anxiety at manageable levels  Description: INTERVENTIONS:  - Administer medication as ordered  - Teach and encourage coping skills  - Provide emotional support  - Assess patient/family for anxiety and ability to cope  Outcome: Not Progressing     Problem: Depression - IP adult  Goal: Effects of depression will be minimized  Description: INTERVENTIONS:  - Assess impact of patient's symptoms on level of functioning, self-care needs and offer support as indicated  - Assess patient/family knowledge of depression, impact on illness and need for teaching  - Provide emotional support, presence and reassurance  - Assess for possible suicidal thoughts, ideation or self-harm  If patient expresses suicidal thoughts or statements do not leave alone, notify physician/AP immediately, initiate Suicide Precautions, and determine need for continual observation   - Initiate consults and referrals as appropriate (a mental health professional, Spiritual Care)  - Administer medication as ordered  Outcome: Not Progressing     Problem: SELF HARM  Goal: Effect of psychiatric condition will be minimized and patient will be protected from self harm  Description: INTERVENTIONS:  - Assess impact of patient's symptoms on level of functioning, self-care needs and offer support as indicated  - Assess patient/family knowledge of depression, impact on illness and need for teaching  - Provide emotional support, presence and reassurance  - Assess for possible suicidal thoughts, ideation or self-harm  If patient expresses suicidal thoughts or statements do not leave alone, notify physician/AP immediately, initiate suicide precautions, and determine need for continual observation    - initiate consults and referrals as appropriate (a mental health professional, Spiritual Care  Outcome: Not Progressing     Problem: SAFETY ADULT  Goal: Patient will remain free of falls  Description: INTERVENTIONS:  - Educate patient/family on patient safety including physical limitations  - Instruct patient to call for assistance with activity   - Consult OT/PT to assist with strengthening/mobility   - Keep Call bell within reach  - Keep bed low and locked with side rails adjusted as appropriate  - Keep care items and personal belongings within reach  - Initiate and maintain comfort rounds  - Make Fall Risk Sign visible to staff  - Apply yellow socks and bracelet for high fall risk patients  - Consider moving patient to room near nurses station  Outcome: Not Progressing  Goal: Maintain or return to baseline ADL function  Description: INTERVENTIONS:  - Educate patient/family on patient safety including physical limitations  - Instruct patient to call for assistance with activity   - Consult OT/PT to assist with strengthening/mobility   - Keep Call bell within reach  - Keep bed low and locked with side rails adjusted as appropriate  - Keep care items and personal belongings within reach  - Initiate and maintain comfort rounds  - Make Fall Risk Sign visible to staff    - Apply yellow socks and bracelet for high fall risk patients  - Consider moving patient to room near nurses station  Outcome: Not Progressing  Goal: Maintains/Returns to pre admission functional level  Description: INTERVENTIONS:  -  Assess patient's ability to carry out ADLs; assess patient's baseline for ADL function and identify physical deficits which impact ability to perform ADLs (bathing, care of mouth/teeth, toileting, grooming, dressing, etc )  - Assess/evaluate cause of self-care deficits   - Assess range of motion  - Assess patient's mobility; develop plan if impaired  - Assess patient's need for assistive devices and provide as appropriate  - Encourage maximum independence but intervene and supervise when necessary  - Involve family in performance of ADLs  - Assess for home care needs following discharge   - Consider OT consult to assist with ADL evaluation and planning for discharge  - Provide patient education as appropriate  Outcome: Not Progressing     Problem: Knowledge Deficit  Goal: Patient/family/caregiver demonstrates understanding of disease process, treatment plan, medications, and discharge instructions  Description: Complete learning assessment and assess knowledge base    Interventions:  - Provide teaching at level of understanding  - Provide teaching via preferred learning methods  Outcome: Not Progressing     Problem: MOBILITY - ADULT  Goal: Maintain or return to baseline ADL function  Description: INTERVENTIONS:  - Educate patient/family on patient safety including physical limitations  - Instruct patient to call for assistance with activity   - Consult OT/PT to assist with strengthening/mobility   - Keep Call bell within reach  - Keep bed low and locked with side rails adjusted as appropriate  - Keep care items and personal belongings within reach  - Initiate and maintain comfort rounds  - Make Fall Risk Sign visible to staff    - Apply yellow socks and bracelet for high fall risk patients  - Consider moving patient to room near nurses station  Outcome: Not Progressing  Goal: Maintains/Returns to pre admission functional level  Description: INTERVENTIONS:  -  Assess patient's ability to carry out ADLs; assess patient's baseline for ADL function and identify physical deficits which impact ability to perform ADLs (bathing, care of mouth/teeth, toileting, grooming, dressing, etc )  - Assess/evaluate cause of self-care deficits   - Assess range of motion  - Assess patient's mobility; develop plan if impaired  - Assess patient's need for assistive devices and provide as appropriate  - Encourage maximum independence but intervene and supervise when necessary  - Involve family in performance of ADLs  - Assess for home care needs following discharge   - Consider OT consult to assist with ADL evaluation and planning for discharge  - Provide patient education as appropriate  Outcome: Not Progressing     Problem: Prexisting or High Potential for Compromised Skin Integrity  Goal: Skin integrity is maintained or improved  Description: INTERVENTIONS:  - Identify patients at risk for skin breakdown  - Assess and monitor skin integrity  - Assess and monitor nutrition and hydration status  - Monitor labs   - Assess for incontinence   - Turn and reposition patient  - Assist with mobility/ambulation  - Relieve pressure over bony prominences  - Avoid friction and shearing  - Provide appropriate hygiene as needed including keeping skin clean and dry  - Evaluate need for skin moisturizer/barrier cream  - Collaborate with interdisciplinary team   - Patient/family teaching  - Consider wound care consult   Outcome: Not Progressing

## 2022-03-16 NOTE — QUICK NOTE
Patient seen and evaluated by Critical Care today and deemed to be appropriate for transfer to [facility]   Spoke to Dr Soraya Abreu from [facility]  Critical care can be contacted via [employer] with any questions or concerns
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
## 2022-03-16 NOTE — CONSULTS
INTERPROFESSIONAL (PHONE) [facility]  Irene Inman 33 y o  female MRN: [id number]  Unit/Bed#: ED CT2 Encounter: [id number]      Reason for Consult / Principal Problem: alprazolam overdose  Inpatient consult to Toxicology  Consult performed by: Shahida Sarmiento DO  Consult ordered by: Yuan Lindsey DO        03/15/22      ASSESSMENT:  1  Alprazolam overdose    RECOMMENDATIONS:  Observe for another 2-3 hours  If mental status normal, can be cleared  If mental status worsens or not considered normal after that observation, then admit for observation and supportive care, airway monitoring  I would avoid flumazenil given the fact that the patient takes benzos chronically  For further questions, please contact the medical  on call via [employer] or throughl the [employer]  Service or Patient [employer]  Please see additional teaching note below:    Hx and PE limited by the dynamics of a phone consultation  I have not personally interviewed or evaluated the patient, but only advised based on the information provided to me  Primary provider is responsible for all clinical decisions  Pertinent history, physical exam and clinical findings and course discussed: Irene Inman is a 33y o  year old female who presents with an alprazolam  She reportedly ingested 2-3 handfuls of 0 25mg alprazolam about an hour prior to arrival  Per the ED provider, she is described as being sleepy, but otherwise normally rousable,     Review of systems and physical exam not performed by me      Historical Information   Past Medical History:   Diagnosis Date    Anxiety disorder     Hypertension     PTSD (post-traumatic stress disorder)      Past Surgical History:   Procedure Laterality Date    CHOLECYSTECTOMY       Social History   Social History     Substance and Sexual Activity   Alcohol Use No     Social History     Substance and Sexual Activity   Drug Use No     Social History     Tobacco Use   Smoking Status Current Every Day Smoker    Packs/day: 2 00    Types: Cigarettes   Smokeless Tobacco Never Used     History reviewed  No pertinent family history  Prior to Admission medications    Medication Sig Start Date End Date Taking? Authorizing Provider   desvenlafaxine (PRISTIQ) 100 mg 24 hr tablet Take 100 mg by mouth daily    Historical Provider, MD   diphenhydrAMINE (BENADRYL) 2 % cream Apply topically 3 (three) times a day as needed for itching 5/22/21   Felix Balderas PA-C   doxepin (SINEquan) 10 mg capsule Take 10 mg by mouth daily at bedtime    Historical Provider, MD   escitalopram (LEXAPRO) 20 mg tablet Take 20 mg by mouth daily  Historical Provider, MD   famotidine (PEPCID) 20 mg tablet Take 1 tablet (20 mg total) by mouth 2 (two) times a day for 7 days 5/22/21 5/29/21  Felix Balderas PA-C   gabapentin (NEURONTIN) 400 mg capsule Take 400 mg by mouth 3 (three) times a day    Historical Provider, MD   lurasidone (LATUDA) 40 mg tablet Take 40 mg by mouth daily with dinner    Historical Provider, MD   naproxen (NAPROSYN) 500 mg tablet Take 1 tablet by mouth 2 (two) times a day with meals for 30 doses  Patient not taking: Reported on 5/22/2021 9/25/16 10/10/16  Madai Lopez MD   prazosin (MINIPRESS) 2 mg capsule Take 4 mg by mouth daily at bedtime    Historical Provider, MD       Current Facility-Administered Medications   Medication Dose Route Frequency    sodium chloride 0 9 % bolus 1,000 mL  1,000 mL Intravenous Once       Allergies   Allergen Reactions    Shellfish-Derived Products - Food Allergy Anaphylaxis     Pt states her throat swells up and and gets very itchy          Objective       Intake/Output Summary (Last 24 hours) at 3/15/2022 2001  Last data filed at 3/15/2022 1902  Gross per 24 hour   Intake --   Output 150 ml   Net -150 ml       Invasive Devices:   Peripheral IV 03/15/22 Dorsal (posterior); Left Hand (Active)       Peripheral IV 03/15/22 Right Antecubital (Active)   Site Assessment WDL 03/15/22 1902   Dressing Type Transparent 03/15/22 1902   Line Status Blood return noted 03/15/22 1902   Dressing Status Clean;Dry; Intact 03/15/22 1902       Urethral Catheter 18 Fr  (Active)   Amt returned on insertion(mL) 150 mL 03/15/22 1902   Reasons to continue Urinary Catheter  Accurate I&O assessment in critically ill patients (48 hr  max) 03/15/22 1902   Goal for Removal No longer needed- Will place order to discontinue 03/15/22 1902   Site Assessment Clean;Skin intact 03/15/22 1902   Securement Method Securing device (Describe) 03/15/22 1902       Vitals   Vitals:    03/15/22 1852 03/15/22 1900 03/15/22 1915 03/15/22 1945   BP: 135/83 128/77 132/76 121/70   Pulse: 91 91 89 80   Resp: 14 (!) 25 19 18   Patient Position - Orthostatic VS:  Sitting Sitting Lying   Temp: 98 1 °F (36 7 °C)            EKG, Pathology, and/or Other Studies: I have personally reviewed pertinent reports  Lab Results: I have personally reviewed pertinent reports  Labs:    Results from last 7 days   Lab Units 03/15/22  1900   WBC Thousand/uL 7 29   HEMOGLOBIN g/dL 14 4   HEMATOCRIT % 43 3   PLATELETS Thousands/uL 332   NEUTROS PCT % 66   LYMPHS PCT % 26   MONOS PCT % 6      Results from last 7 days   Lab Units 03/15/22  1900   SODIUM mmol/L 143   POTASSIUM mmol/L 3 1*   CHLORIDE mmol/L 104   CO2 mmol/L 29   BUN mg/dL 4*   CREATININE mg/dL 0 82   CALCIUM mg/dL 8 1*   ALK PHOS U/L 84   ALT U/L 36   AST U/L 20              No results found for: TROPONINI      Results from last 7 days   Lab Units 03/15/22  1900   ETHANOL LVL mg/dL <3     Invalid input(s): EXTPREGUR      Imaging Studies: I have personally reviewed pertinent reports  Counseling / Coordination of Care  Total time spent today 18 minutes  This was a phone consultation

## 2022-03-16 NOTE — ASSESSMENT & PLAN NOTE
· Currently smoking 2 ppd   · Nicotine patch ordered for tomorrow AM  · Smoking cessation education upon discharge
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
## 2022-03-16 NOTE — UTILIZATION REVIEW
Initial Clinical Review    Admission: Date/Time/Statement:   Admission Orders (From admission, onward)     Ordered        03/15/22 1940  Inpatient Admission  Once                      Orders Placed This Encounter   Procedures    Inpatient Admission     Standing Status:   Standing     Number of Occurrences:   1     Order Specific Question:   Level of Care     Answer:   Level 1 Stepdown [13]     Order Specific Question:   Estimated length of stay     Answer:   More than 2 Midnights     Order Specific Question:   Certification     Answer:   I certify that inpatient services are medically necessary for this patient for a duration of greater than two midnights  See H&P and MD Progress Notes for additional information about the patient's course of treatment  ED Arrival Information     Expected Arrival Acuity    - 3/15/2022 18:34 Emergent         Means of arrival Escorted by Service Admission type    Ambulance [address] Emergency         Arrival complaint    Suicide attempt, overdose        Chief Complaint   Patient presents with    Overdose - Intentional     took 2 handfuls of xanax  0 25 mg  Initial Presentation: 34 yo female to ED from home via EMS after SI and intentional overdose on Xanax  Took 3 handfuls of xanax, witnessed by   Reports her  is making her life miserable and she just wanted to die  lethargic on arrival   Given 1l NSS   UDS + benzos, etoh neg   Admitted IP status to stepdown unit , cont 1:1 OBS   PMHX PTSD , depression , anxiety   Plan for IVF , neuro checks q1hr , 1:1 OBS , consult crisis , hold all home meds   3/15 Toxicology Consult   Alprazolam overdose  Observe for another 2-3 hours  If mental status normal, can be cleared  If mental status worsens or not considered normal after that observation, then admit for observation and supportive care, airway monitoring   I would avoid flumazenil given the fact that the patient takes benzos chronically    Date: 3/16   Day 2:  Vitals signs and oxygenation stable, patient has remained on room air  Medically stable for psychiatric placement  ED crisis consulted   Cognition normal , speech normal , thought content normal       ED Triage Vitals   Temperature Pulse Respirations Blood Pressure SpO2   03/15/22 1852 03/15/22 1852 03/15/22 1852 03/15/22 1852 03/15/22 1852   98 1 °F (36 7 °C) 91 14 135/83 100 %      Temp Source Heart Rate Source Patient Position - Orthostatic VS BP Location FiO2 (%)   03/15/22 2200 03/15/22 1900 03/15/22 1900 03/15/22 1900 --   Temporal Monitor Sitting Left arm       Pain Score       03/15/22 1852       No Pain          Wt Readings from Last 1 Encounters:   03/16/22 79 kg (174 lb 2 6 oz)     Additional Vital Signs:   03/16/22 0733 97 6 °F (36 4 °C) -- -- -- -- -- -- --   03/16/22 0700 -- 82 19 104/51 72 98 % -- --   03/16/22 0606 -- -- 16 -- -- -- -- --   03/16/22 0400 97 3 °F (36 3 °C) Abnormal  72 18 100/53 73 97 % -- --   03/16/22 0300 -- 85 12 112/56 78 96 % -- --   03/16/22 0200 -- 64 18 119/57 82 97 % -- --   03/16/22 0100 -- 72 12 106/59 78 98 % -- --   03/16/22 0000 -- 71 12 129/65 88 97 % -- --   03/15/22 2300 -- 73 15 109/53 77 97 % -- --   03/15/22 2200 97 6 °F (36 4 °C) 76 21 109/62 81 98 % None (Room air) --   03/15/22 2100 -- 80 20 118/78 93 100 % None (Room air) Lying   03/15/22 2045 -- 80 20 123/81 97 100 % None (Room air) Lying   03/15/22 2030 -- 80 20 127/83 101 100 % None (Room air) Lying   03/15/22 2015 -- 80 20 125/73 94 100 % None (Room air) Lying   03/15/22 2004 -- -- -- -- -- -- None (Room air) --   03/15/22 2000 -- 78 20 118/70 90 100 % None (Room air) Lying   03/15/22 1945 -- 80 18 121/70 90 100 % None (Room air) Lying   03/15/22 1930 -- 82 20 105/57 76 100 % None (Room air) Lying   03/15/22 1915 -- 89 19 132/76 100 100 % None (Room air) Sitting   03/15/22 1900 -- 91 25 Abnormal  128/77 98 100 % None (Room air) Sitting       Pertinent Labs/Diagnostic Test Results:   3/15 EKG NSR RBBB   XR chest 1 view portable   Final Result by Graham Mitchell MD (03/16 0974)      No acute cardiopulmonary disease                    Workstation performed: [id number]           Results from last 7 days   Lab Units 03/16/22  0518 03/15/22  1900   WBC Thousand/uL 6 53 7 29   HEMOGLOBIN g/dL 12 2 14 4   HEMATOCRIT % 36 1 43 3   PLATELETS Thousands/uL 272 332   NEUTROS ABS Thousands/µL 3 95 4 84     Results from last 7 days   Lab Units 03/16/22  0518 03/15/22  1900   SODIUM mmol/L 141 143   POTASSIUM mmol/L 3 5 3 1*   CHLORIDE mmol/L 108 104   CO2 mmol/L 25 29   ANION GAP mmol/L 8 10   BUN mg/dL 4* 4*   CREATININE mg/dL 0 59* 0 82   EGFR ml/min/1 73sq m 122 95   CALCIUM mg/dL 7 6* 8 1*   MAGNESIUM mg/dL 2 3 2 1   PHOSPHORUS mg/dL 2 6*  --      Results from last 7 days   Lab Units 03/16/22  0518 03/15/22  1900   AST U/L 16 20   ALT U/L 26 36   ALK PHOS U/L 68 84   TOTAL PROTEIN g/dL 5 2* 6 3*   ALBUMIN g/dL 2 8* 3 6   TOTAL BILIRUBIN mg/dL 0 29 0 25     Results from last 7 days   Lab Units 03/16/22  0518 03/15/22  1900   GLUCOSE RANDOM mg/dL 88 99     Results from last 7 days   Lab Units 03/15/22  1903   CLARITY UA  Clear   COLOR UA  Yellow   SPEC GRAV UA  <=1 005   PH UA  6 0   GLUCOSE UA mg/dl Negative   KETONES UA mg/dl Negative   BLOOD UA  Moderate*   PROTEIN UA mg/dl Negative   NITRITE UA  Negative   BILIRUBIN UA  Negative   UROBILINOGEN UA E U /dl 0 2   LEUKOCYTES UA  Negative   WBC UA /hpf None Seen   RBC UA /hpf 4-10*   BACTERIA UA /hpf None Seen   EPITHELIAL CELLS WET PREP /hpf None Seen     Results from last 7 days   Lab Units 03/15/22  1903   AMPH/METH  Negative   BARBITURATE UR  Negative   BENZODIAZEPINE UR  Positive*   COCAINE UR  Negative   METHADONE URINE  Negative   OPIATE UR  Negative   PCP UR  Negative   THC UR  Negative     Results from last 7 days   Lab Units 03/15/22  1900   ETHANOL LVL mg/dL <3   ACETAMINOPHEN LVL ug/mL <2*   SALICYLATE LVL mg/dL 4 1     ED Treatment:   Medication Administration from 03/15/2022 1834 to 03/15/2022 2136       Date/Time Order Dose Route Action Action by Comments     03/15/2022 2052 sodium chloride 0 9 % bolus 1,000 mL 0 mL Intravenous [facility]      03/15/2022 1904 sodium chloride 0 9 % bolus 1,000 mL 1,000 mL Intravenous New Bag Juan John RN      03/15/2022 2102 potassium chloride 20 mEq IVPB (premix) 20 mEq Intravenous [facility]      03/15/2022 2058 multi-electrolyte (PLASMALYTE-A/ISOLYTE-S PH 7 4) IV solution 125 mL/hr Intravenous New Bag Jose Kavitha         Past Medical History:   Diagnosis Date    Anxiety disorder     Hypertension     PTSD (post-traumatic stress disorder)      Present on Admission:   [facility] drug overdose ([facility] )   Hypokalemia   Anxiety   Depression   PTSD (post-traumatic stress disorder)   Tobacco abuse      Admitting Diagnosis: Intentional drug overdose, initial encounter ([place] [address] ) [T50 252A]  Age/Sex: 33 y o  female  Admission Orders:  Scheduled Medications:  Po kdur x1  nicotine, 21 mg, Transdermal, Daily      Continuous IV Infusions:    PLASMALYTE-A/ISOLYTE-S PH 7 4) IV solution  @ 125/hr    PRN Meds:     Neuro checks   I&O   amb pt   Reg diet   Cont OBS   Tele   IP CONSULT TO TOXICOLOGY  IP CONSULT TO CASE MANAGEMENT  IP CONSULT TO ED CRISIS WORKER  IP CONSULT TO PSYCHIATRY    Network Utilization Review Department  ATTENTION: Please call with any questions or concerns to [phone] and carefully listen to the prompts so that you are directed to the right person  All voicemails are confidential   [facility] Vicky all requests for admission clinical reviews, approved or denied determinations and any other requests to dedicated fax number below belonging to the campus where the patient is receiving treatment   List of dedicated fax numbers for the Facilities:  FACILITY NAME UR FAX NUMBER   ADMISSION DENIALS (Administrative/Medical Necessity) [phone]   [facility] [address] (Maternity/NICU/Pediatrics) [address] [facility],[address] [facility] [address] [place] [address]  [phone]   Franc Dorman [address] [address] [place] [address] [address] [facility] [place] [address] [phone]
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
## 2022-03-16 NOTE — PROGRESS NOTES
Pastoral Care Progress Note    3/16/2022  Patient: Carlyn James : 1990  Admission Date & Time: 3/15/2022 1840  MRN: [id number] [facility]: [id number]                     Chaplaincy Interventions Utilized:   Chaplaincy Outcomes Achieved:  Declined  support     22 1400   Clinical Encounter Type   Visited With Patient   Routine Visit Introduction   Referral To   (census/rounds)

## 2022-03-17 LAB
FLUAV RNA RESP QL NAA+PROBE: NEGATIVE
FLUBV RNA RESP QL NAA+PROBE: NEGATIVE
MRSA NOSE QL CULT: NORMAL
RSV RNA RESP QL NAA+PROBE: NEGATIVE
SARS-COV-2 RNA RESP QL NAA+PROBE: NEGATIVE

## 2022-03-17 PROCEDURE — 99232 SBSQ HOSP IP/OBS MODERATE 35: CPT | Performed by: STUDENT IN AN ORGANIZED HEALTH CARE EDUCATION/TRAINING PROGRAM

## 2022-03-17 PROCEDURE — 0241U HB NFCT DS VIR RESP RNA 4 TRGT: CPT | Performed by: STUDENT IN AN ORGANIZED HEALTH CARE EDUCATION/TRAINING PROGRAM

## 2022-03-17 RX ORDER — LORAZEPAM 2 MG/ML
0.5 INJECTION INTRAMUSCULAR EVERY 6 HOURS PRN
Status: DISCONTINUED | OUTPATIENT
Start: 2022-03-17 | End: 2022-03-18 | Stop reason: HOSPADM

## 2022-03-17 RX ORDER — LANOLIN ALCOHOL/MO/W.PET/CERES
9 CREAM (GRAM) TOPICAL
Status: DISCONTINUED | OUTPATIENT
Start: 2022-03-17 | End: 2022-03-18 | Stop reason: HOSPADM

## 2022-03-17 RX ORDER — DESVENLAFAXINE 50 MG/1
100 TABLET, EXTENDED RELEASE ORAL
Status: DISCONTINUED | OUTPATIENT
Start: 2022-03-17 | End: 2022-03-18 | Stop reason: HOSPADM

## 2022-03-17 RX ORDER — LORAZEPAM 2 MG/ML
0.5 INJECTION INTRAMUSCULAR ONCE
Status: COMPLETED | OUTPATIENT
Start: 2022-03-17 | End: 2022-03-17

## 2022-03-17 RX ORDER — ACETAMINOPHEN 325 MG/1
650 TABLET ORAL EVERY 6 HOURS PRN
Status: DISCONTINUED | OUTPATIENT
Start: 2022-03-17 | End: 2022-03-18 | Stop reason: HOSPADM

## 2022-03-17 RX ADMIN — NICOTINE 21 MG: 21 PATCH, EXTENDED RELEASE TRANSDERMAL at 08:33

## 2022-03-17 RX ADMIN — ACETAMINOPHEN 650 MG: 325 TABLET, FILM COATED ORAL at 14:28

## 2022-03-17 RX ADMIN — DESVENLAFAXINE 50 MG: 50 TABLET, FILM COATED, EXTENDED RELEASE ORAL at 09:01

## 2022-03-17 RX ADMIN — Medication 9 MG: at 22:12

## 2022-03-17 RX ADMIN — LORAZEPAM 0.5 MG: 2 INJECTION INTRAMUSCULAR; INTRAVENOUS at 20:30

## 2022-03-17 RX ADMIN — DESVENLAFAXINE 100 MG: 50 TABLET, FILM COATED, EXTENDED RELEASE ORAL at 22:11

## 2022-03-17 RX ADMIN — LORAZEPAM 0.5 MG: 2 INJECTION INTRAMUSCULAR; INTRAVENOUS at 10:46

## 2022-03-17 NOTE — ED NOTES
[place] called to request newer labs - theirs were dated 3/15 - [facility] faxed new CMP/CBC about 16:25  Called to verify and patient was accepted by Dr Yamel Pyle; Rn report can call [phone]  You Loving [phone] called for pre-cert about 15:20 -  was Yisel Ramos D - 4 day authorization; 3/18 - 3/21/22; concurrent review on 3/2122 with Ivanna Wang @ [phone]; AUTHORIZATION#: [id number]  Voluntary signed with patient and faxed to [facility] with this note about 17:35 - original placed in envelope on patient's chart - also note left with accepting physician and # to call report  [facility]/Jordy called for transport @ 17:40 - they will call back with a time  Ovidio Jhaveri called back @ 17:45 - 08:00 via [facility]  Patient's Rn advised @ 17:50 and asked to inform the patient; [place] also notified

## 2022-03-17 NOTE — QUICK NOTE
Patient requesting her melatonin, Pristiq, hydroxyzine  Psychiatry note reviewed and per Psychiatry can resume Pristiq and melatonin once medically cleared  Per ICU attending note from earlier today patient is medically cleared    Melatonin and Pristiq will be restarted but will continue to hold off on hydroxyzine per Psychiatry note

## 2022-03-17 NOTE — PLAN OF CARE
Problem: COPING  Goal: Pt/Family able to verbalize concerns and demonstrate effective coping strategies  Description: INTERVENTIONS:  - Assist patient/family to identify coping skills, available support systems and cultural and spiritual values  - Provide emotional support, including active listening and acknowledgement of concerns of patient and caregivers  - Reduce environmental stimuli, as able  - Provide patient education  - Assess for spiritual pain/suffering and initiate spiritual care, including notification of Pastoral Care or jenny based community as needed  - Assess effectiveness of coping strategies  Outcome: Progressing  Goal: Will report anxiety at manageable levels  Description: INTERVENTIONS:  - Administer medication as ordered  - Teach and encourage coping skills  - Provide emotional support  - Assess patient/family for anxiety and ability to cope  Outcome: Progressing     Problem: Depression - IP adult  Goal: Effects of depression will be minimized  Description: INTERVENTIONS:  - Assess impact of patient's symptoms on level of functioning, self-care needs and offer support as indicated  - Assess patient/family knowledge of depression, impact on illness and need for teaching  - Provide emotional support, presence and reassurance  - Assess for possible suicidal thoughts, ideation or self-harm   If patient expresses suicidal thoughts or statements do not leave alone, notify physician/AP immediately, initiate Suicide Precautions, and determine need for continual observation   - Initiate consults and referrals as appropriate (a mental health professional, Spiritual Care)  - Administer medication as ordered  Outcome: Progressing     Problem: SELF HARM  Goal: Effect of psychiatric condition will be minimized and patient will be protected from self harm  Description: INTERVENTIONS:  - Assess impact of patient's symptoms on level of functioning, self-care needs and offer support as indicated  - Assess patient/family knowledge of depression, impact on illness and need for teaching  - Provide emotional support, presence and reassurance  - Assess for possible suicidal thoughts, ideation or self-harm  If patient expresses suicidal thoughts or statements do not leave alone, notify physician/AP immediately, initiate suicide precautions, and determine need for continual observation    - initiate consults and referrals as appropriate (a mental health professional, Spiritual Care  Outcome: Progressing     Problem: SAFETY ADULT  Goal: Patient will remain free of falls  Description: INTERVENTIONS:  - Educate patient/family on patient safety including physical limitations  - Instruct patient to call for assistance with activity   - Consult OT/PT to assist with strengthening/mobility   - Keep Call bell within reach  - Keep bed low and locked with side rails adjusted as appropriate  - Keep care items and personal belongings within reach  - Initiate and maintain comfort rounds  - Make Fall Risk Sign visible to staff  - Offer Toileting every 2 Hours, in advance of need  - Initiate/Maintain bed alarm  - Obtain necessary fall risk management equipment: non slip socks, 1:1 observation  - Apply yellow socks and bracelet for high fall risk patients  - Consider moving patient to room near nurses station  Outcome: Progressing  Goal: Maintain or return to baseline ADL function  Description: INTERVENTIONS:  -  Assess patient's ability to carry out ADLs; assess patient's baseline for ADL function and identify physical deficits which impact ability to perform ADLs (bathing, care of mouth/teeth, toileting, grooming, dressing, etc )  - Assess/evaluate cause of self-care deficits   - Assess range of motion  - Assess patient's mobility; develop plan if impaired  - Assess patient's need for assistive devices and provide as appropriate  - Encourage maximum independence but intervene and supervise when necessary  - Involve family in performance of ADLs  - Assess for home care needs following discharge   - Consider OT consult to assist with ADL evaluation and planning for discharge  - Provide patient education as appropriate  Outcome: Progressing       Problem: Knowledge Deficit  Goal: Patient/family/caregiver demonstrates understanding of disease process, treatment plan, medications, and discharge instructions  Description: Complete learning assessment and assess knowledge base    Interventions:  - Provide teaching at level of understanding  - Provide teaching via preferred learning methods  Outcome: Progressing     Problem: MOBILITY - ADULT  Goal: Maintain or return to baseline ADL function  Description: INTERVENTIONS:  -  Assess patient's ability to carry out ADLs; assess patient's baseline for ADL function and identify physical deficits which impact ability to perform ADLs (bathing, care of mouth/teeth, toileting, grooming, dressing, etc )  - Assess/evaluate cause of self-care deficits   - Assess range of motion  - Assess patient's mobility; develop plan if impaired  - Assess patient's need for assistive devices and provide as appropriate  - Encourage maximum independence but intervene and supervise when necessary  - Involve family in performance of ADLs  - Assess for home care needs following discharge   - Consider OT consult to assist with ADL evaluation and planning for discharge  - Provide patient education as appropriate  Outcome: Progressing

## 2022-03-17 NOTE — ASSESSMENT & PLAN NOTE
Intentional overdose witnessed by spouse with Xanax  Initially admitted to ICU for monitoring  Urine drug screen positive for benzodiazepines  Continue one-to-one observation  Psychiatry recommendations appreciated  Patient is pending transfer to inpatient psychiatry facility

## 2022-03-17 NOTE — PROGRESS NOTES
[address]  Progress Note - Lidia Shaw 1990, 33 y o  female MRN: [id number]  Unit/Bed#: [address] [facility] Encounter: [id number]  Primary Care Provider: Mita Cruz MD   Date and time admitted to hospital: 3/15/2022  6:40 PM    * Intentional drug overdose [facility])  Assessment & Plan  Intentional overdose witnessed by spouse with Xanax  Initially admitted to ICU for monitoring  Urine drug screen positive for benzodiazepines  Continue one-to-one observation  Psychiatry recommendations appreciated  Patient is pending transfer to inpatient psychiatry facility    Tobacco abuse  Assessment & Plan  Nicotine patch     Depression  Assessment & Plan  Continue pristiq    Anxiety  Assessment & Plan  PRN IV Ativan         VTE Pharmacologic Prophylaxis: SCDs    Patient Centered Rounds: Yes  Discussions with Specialists or Other Care Team Provider: Yes    Education and Discussions with Family / Patient: Yes    Time Spent for Care: 45 minutes  More than 50% of total time spent on counseling and coordination of care as described above  Current Length of Stay: 2 day(s)  Current Patient Status: Inpatient   Certification Statement: The patient will continue to require additional inpatient hospital stay due to intentional overdose   Discharge Plan: Anticipate discharge tomorrow to inpatient psych  Code Status: Level 1 - Full Code    Subjective:   Patient reports feeling anxious   Patient is agreeable for transfer to psychiatry facility     Objective:     Vitals:   Temp (24hrs), Av 5 °F (36 4 °C), Min:96 7 °F (35 9 °C), Max:98 8 °F (37 1 °C)    Temp:  [96 7 °F (35 9 °C)-98 8 °F (37 1 °C)] 98 8 °F (37 1 °C)  HR:  [66-87] 87  Resp:  [18-20] 20  BP: (118-143)/(67-91) 143/82  SpO2:  [97 %-99 %] 98 %  Body mass index is 29 61 kg/m²  Input and Output Summary (last 24 hours):      Intake/Output Summary (Last 24 hours) at 3/17/2022 1853  Last data filed at 3/17/2022 0801  Gross per 24 hour   Intake 480 ml   Output --   Net 480 ml       Physical Exam:   Physical Exam  HENT:      Head: Normocephalic and atraumatic  Mouth/Throat:      Mouth: Mucous membranes are moist    Eyes:      Extraocular Movements: Extraocular movements intact  Cardiovascular:      Rate and Rhythm: Normal rate  Pulmonary:      Effort: Pulmonary effort is normal       Breath sounds: Normal breath sounds  Abdominal:      General: Abdomen is flat  Palpations: Abdomen is soft  Tenderness: There is no abdominal tenderness  Skin:     General: Skin is warm and dry  Neurological:      Mental Status: She is alert  Additional Data:     Labs:  Results from last 7 days   Lab Units 03/16/22  0518   WBC Thousand/uL 6 53   HEMOGLOBIN g/dL 12 2   HEMATOCRIT % 36 1   PLATELETS Thousands/uL 272   NEUTROS PCT % 61   LYMPHS PCT % 31   MONOS PCT % 6   EOS PCT % 2     Results from last 7 days   Lab Units 03/16/22  0518   SODIUM mmol/L 141   POTASSIUM mmol/L 3 5   CHLORIDE mmol/L 108   CO2 mmol/L 25   BUN mg/dL 4*   CREATININE mg/dL 0 59*   ANION GAP mmol/L 8   CALCIUM mg/dL 7 6*   ALBUMIN g/dL 2 8*   TOTAL BILIRUBIN mg/dL 0 29   ALK PHOS U/L 68   ALT U/L 26   AST U/L 16   GLUCOSE RANDOM mg/dL 88                       Lines/Drains:  Invasive Devices  Report    Peripheral Intravenous Line            Peripheral IV 03/15/22 Dorsal (posterior); Left Hand 2 days                      Imaging: Reviewed radiology reports from this admission including: chest xray    Recent Cultures (last 7 days):         Last 24 Hours Medication List:   Current Facility-Administered Medications   Medication Dose Route Frequency Provider Last Rate    acetaminophen  650 mg Oral Q6H PRN Agape L Jimena, DO      desvenlafaxine  100 mg Oral HS Agape L Jimena DO      desvenlafaxine succinate  50 mg Oral Daily Agape L Jimena, DO      LORazepam  0 5 mg Intravenous Q6H PRN Agape L Jimena, DO      melatonin  9 mg Oral HS Agape L Jimena, DO      nicotine  21 mg Transdermal Daily Ludivina Ruiz DO          Today, Patient Was Seen By: Esperanza Martinez DO    **Please Note: This note may have been constructed using a voice recognition system  **

## 2022-03-17 NOTE — ED NOTES
3/17/22 @ 1023:  PES notified by RN that  Patient is cleared and is "demanding an update "  PES wasn't notified when patient was medically cleared; this is the first PES is being notified, so nothing has been done at this point  PES notes that telepsych was completed on 3/16 by Dr Jade Michel and recommended inpatient treatment  PES will review chart and meet with patient  PES contacted [facility]; there are beds available at [facility]; Kevin Saavedra will continue to follow and meet with patient  [facility] [address]    1115: PES met with patient, who agrees to voluntary inpatient psychiatric treatment  Patient was tearful and sad, and realizes she needs help  Patient endorsed [address]; waiting for attending to sign; will request COVID19 ; PES will continue to monitor  [facility], [address]: PES located attending; endorsed and faxed 201 to [facility]  [address] [facility] [address], [address]: PES notified by Hemanth at [facility] that bed is no longer available, but will be kept on "wait list" for tomorrow  PES will continue bed search  [facility] [address]    1311: PES called [facility]; no beds but will review for "wait list "   PES called [facility]:  No answer, so PES forwarded referral anyway  PES called [facility]:  Stas Husain says they have beds; referral faxed  [address], [address]: PES faxed additional lab results and medical toxicology note to [facility], and [employer]    Bryan Monge, MS

## 2022-03-18 VITALS
BODY MASS INDEX: 29.38 KG/M2 | RESPIRATION RATE: 18 BRPM | SYSTOLIC BLOOD PRESSURE: 146 MMHG | OXYGEN SATURATION: 98 % | HEART RATE: 83 BPM | HEIGHT: 65 IN | TEMPERATURE: 98.5 F | WEIGHT: 176.37 LBS | DIASTOLIC BLOOD PRESSURE: 98 MMHG

## 2022-03-18 PROBLEM — E87.6 HYPOKALEMIA: Status: RESOLVED | Noted: 2022-03-15 | Resolved: 2022-03-18

## 2022-03-18 PROCEDURE — 99239 HOSP IP/OBS DSCHRG MGMT >30: CPT | Performed by: STUDENT IN AN ORGANIZED HEALTH CARE EDUCATION/TRAINING PROGRAM

## 2022-03-18 RX ORDER — NICOTINE 21 MG/24HR
1 PATCH, TRANSDERMAL 24 HOURS TRANSDERMAL DAILY
Qty: 28 PATCH | Refills: 0 | Status: SHIPPED | OUTPATIENT
Start: 2022-03-18

## 2022-03-18 RX ADMIN — NICOTINE 21 MG: 21 PATCH, EXTENDED RELEASE TRANSDERMAL at 08:26

## 2022-03-18 NOTE — ED NOTES
3/18/22 @ 0815:  [facility] arrived and patient was transported to [facility] without complications    Foreign Hidalgo MS

## 2022-03-18 NOTE — [place] UNIVERSAL TRANSFER FORM
[place] UNIVERSAL TRANSFER FORM  (ALL ITEMS MUST BE COMPLETED)    1  TRANSFER FROM: [address] [facility]      TRANSFER TO: [facility]     2  DATE OF TRANSFER: 3/18/2022                        TIME OF TRANSFER: 0800    3  PATIENT NAME: DALLAS Mc      YOB: 1990                             GENDER: female    4  LANGUAGE:   English    5  PHYSICIAN NAME:  Brandon Munson DO                   PHONE: [phone]  CODE STATUS: Level 1 - Full Code        Out of Hospital DNR Attached: No    7  :                                      :  Extended Emergency Contact Information  Primary Emergency ContactEvsosa Powell  Mobile Phone: [phone]  Relation: Spouse  Secondary Emergency Contact: [address]  Mobile Phone: [phone]  Relation: Mother           Health Care Representative/Proxy:  No           Legal Guardian:  No             NAME OF:           HEALTH CARE REPRESENTATIVE/PROXY:                                         OR           LEGAL GUARDIAN, IF NOT :                                               PHONE:  (Day)           (Night)                        (Cell)    8  REASON FOR TRANSFER: (Must include brief medical history and recent changes in physical function or cognition ) Mental Health-Voluntary            V/S: /87   Pulse 87   Temp 98 4 °F (36 9 °C)   Resp 18   Ht 5' 5" (1 651 m)   Wt 80 7 kg (177 lb 14 6 oz)   LMP 03/01/2022   SpO2 98%   BMI 29 61 kg/m²           PAIN: None    9  PRIMARY DIAGNOSIS: Intentional drug overdose ([facility] [address] )                    Secondary Diagnosis:         Pacemaker: No      Internal Defib: No          Mental Health Diagnosis (if Applicable):    10  RESTRAINTS: No     11  RESPIRATORY NEEDS: None    12  ISOLATION/PRECAUTION: None    13  ALLERGY: Shellfish-derived products - food allergy    14  SENSORY:       Vision Good    15  SKIN CONDITION: No Wounds    16   DIET: Regular    17  IV ACCESS: None    18  PERSONAL ITEMS SENT WITH PATIENT: OtherClothing    23  ATTACHED DOCUMENTS: MUST ATTACH CURRENT MEDICATION INFORMATION Face Sheet, MAR, Diagnostic Studies, Labs and Discharge Summary    20  AT RISK ALERTS:None        HARM TO: N/A    21  WEIGHT BEARING STATUS:         Left Leg: Full        Right Leg: Full    22  MENTAL STATUS:Alert    23  FUNCTION:        Walk: Self        Transfer: Self        Toilet: Self        Feed: Self    24  IMMUNIZATIONS/SCREENING:     There is no immunization history on file for this patient  25  BOWEL: Continent    26  BLADDER: Continent    27   SENDING FACILITY CONTACT: JOSEPH                  Title: RN        Unit: 4N        Phone: [phone] [address] (if known):        Title:        Unit:         Phone:         FORM PREFILLED BY (if applicable)       Title:       Unit:        Phone:         FORM COMPLETED BY Chelsea Kingsley RN      Title: RN      Phone: [phone]

## 2022-03-18 NOTE — PLAN OF CARE
Problem: Potential for Falls  Goal: Patient will remain free of falls  Description: INTERVENTIONS:  - Educate patient/family on patient safety including physical limitations  - Instruct patient to call for assistance with activity   - Consult OT/PT to assist with strengthening/mobility   - Keep Call bell within reach  - Keep bed low and locked with side rails adjusted as appropriate  - Keep care items and personal belongings within reach  - Initiate and maintain comfort rounds  - Make Fall Risk Sign visible to staff  - Offer Toileting every 2  Hours, in advance of need  - Initiate/Maintain Chair/Bed alarm  - Obtain necessary fall risk management equipment: Pt is independent  - Apply yellow socks and bracelet for high fall risk patients  - Consider moving patient to room near nurses station  Outcome: Progressing

## 2022-03-18 NOTE — NURSING NOTE
Pamela Desai [address] at [phone] gave report to Addi Ibrahim  Pt is being admitted voluntarily and transported via SLET  Patient has all her belongings and IV was removed

## 2022-03-18 NOTE — DISCHARGE INSTRUCTIONS
Adult Overdose   WHAT YOU NEED TO KNOW:   An overdose occurs when you take more medicine than is safe to take  An overdose may be mild, or it may be a life-threatening emergency  You may feel drowsy, dizzy, or nauseated, depending on what medicine you took  No specific harm was found to your body as a result of your overdose  Your symptoms have decreased over the last 6 to 12 hours  DISCHARGE INSTRUCTIONS:   Call 911 if you or someone close to you has any of the following symptoms:   · Your face is very pale and clammy to the touch  · Your body is limp or you are unable to speak  · You cannot be awakened  · Your breathing is slower or faster than usual      · Your heart is beating slower than usual     · You feel confused or more tired than usual, or you are sweating more than normal     · Your speech is slurred  · Your fingernails or lips are blue or purple  Return to the emergency department if:   · You have severe nausea and vomiting  · You cannot have a bowel movement or urinate  · Your skin and the whites of your eyes turn yellow  Contact your healthcare provider if:   · You think your medicine is not working  · You have nausea, vomiting, diarrhea, or abdominal cramps  · You have questions or concerns about your medicine  Take your medicine as directed:  Contact your healthcare provider if you think your medicine is not helping or if you have side effects  Do not take more medicine that is prescribed  Keep your medicines in the original containers  Keep a list of the medicines, vitamins, and herbs you take  Include the amounts, and when and why you take them  Do not share your medicine with others  Prevent another overdose:   · Read labels carefully  Read the labels of all the medicines that you take  Never take more than the label says to take  If you have questions, ask your pharmacist or healthcare provider  · Do not drink alcohol    Alcohol increases your risk for another overdose  Alcohol can also hide important symptoms that you need to call your healthcare provider for  · Do not drive or operate machinery  until your healthcare provider says it is okay  These activities may be dangerous after an overdose  · Use caution if you take more than one medicine at a time  Mixing medicines or taking more than one medicine at a time can be dangerous  · Tell your family or friends what medicines you are taking  Talk with them about what to do if you have an overdose  Follow up with your healthcare provider as directed: You may need to see a counselor or psychiatrist  Write down your questions so you remember to ask them during your visits  © Copyright [employer] 2022 Information is for End User's use only and may not be sold, redistributed or otherwise used for commercial purposes  All illustrations and images included in CareNotes® are the copyrighted property of [employer]  or [employer]  The above information is an  only  It is not intended as medical advice for individual conditions or treatments  Talk to your doctor, nurse or pharmacist before following any medical regimen to see if it is safe and effective for you

## 2022-03-18 NOTE — NURSING NOTE
Pamela Desai 316 at [phone] at [facility] was transferred to manager and call was disconnected  Attempted call several times but no one answered the phone  Phone line did not provide option to leave message

## 2022-03-18 NOTE — PLAN OF CARE
Problem: Potential for Falls  Goal: Patient will remain free of falls  Description: INTERVENTIONS:  - Educate patient/family on patient safety including physical limitations  - Instruct patient to call for assistance with activity   - Consult OT/PT to assist with strengthening/mobility   - Keep Call bell within reach  - Keep bed low and locked with side rails adjusted as appropriate  - Keep care items and personal belongings within reach  - Initiate and maintain comfort rounds  - Make Fall Risk Sign visible to staff  - Offer Toileting every 4 Hours, in advance of need  - Initiate/Maintain bed alarm  - Obtain necessary fall risk management equipment: siderails  - Apply yellow socks and bracelet for high fall risk patients  - Consider moving patient to room near nurses station  Outcome: Progressing     Problem: COPING  Goal: Pt/Family able to verbalize concerns and demonstrate effective coping strategies  Description: INTERVENTIONS:  - Assist patient/family to identify coping skills, available support systems and cultural and spiritual values  - Provide emotional support, including active listening and acknowledgement of concerns of patient and caregivers  - Reduce environmental stimuli, as able  - Provide patient education  - Assess for spiritual pain/suffering and initiate spiritual care, including notification of Pastoral Care or jenny based community as needed  - Assess effectiveness of coping strategies  Outcome: Progressing  Goal: Will report anxiety at manageable levels  Description: INTERVENTIONS:  - Administer medication as ordered  - Teach and encourage coping skills  - Provide emotional support  - Assess patient/family for anxiety and ability to cope  Outcome: Progressing     Problem: Depression - IP adult  Goal: Effects of depression will be minimized  Description: INTERVENTIONS:  - Assess impact of patient's symptoms on level of functioning, self-care needs and offer support as indicated  - Assess patient/family knowledge of depression, impact on illness and need for teaching  - Provide emotional support, presence and reassurance  - Assess for possible suicidal thoughts, ideation or self-harm  If patient expresses suicidal thoughts or statements do not leave alone, notify physician/AP immediately, initiate Suicide Precautions, and determine need for continual observation   - Initiate consults and referrals as appropriate (a mental health professional, Spiritual Care)  - Administer medication as ordered  Outcome: Progressing     Problem: SELF HARM  Goal: Effect of psychiatric condition will be minimized and patient will be protected from self harm  Description: INTERVENTIONS:  - Assess impact of patient's symptoms on level of functioning, self-care needs and offer support as indicated  - Assess patient/family knowledge of depression, impact on illness and need for teaching  - Provide emotional support, presence and reassurance  - Assess for possible suicidal thoughts, ideation or self-harm  If patient expresses suicidal thoughts or statements do not leave alone, notify physician/AP immediately, initiate suicide precautions, and determine need for continual observation    - initiate consults and referrals as appropriate (a mental health professional, Spiritual Care  Outcome: Progressing     Problem: SAFETY ADULT  Goal: Patient will remain free of falls  Description: INTERVENTIONS:  - Educate patient/family on patient safety including physical limitations  - Instruct patient to call for assistance with activity   - Consult OT/PT to assist with strengthening/mobility   - Keep Call bell within reach  - Keep bed low and locked with side rails adjusted as appropriate  - Keep care items and personal belongings within reach  - Initiate and maintain comfort rounds  - Make Fall Risk Sign visible to staff  - Offer Toileting every 4 Hours, in advance of need  - Initiate/Maintain bed alarm  - Obtain necessary fall risk management equipment: siderails  - Apply yellow socks and bracelet for high fall risk patients  - Consider moving patient to room near nurses station  Outcome: Progressing  Goal: Maintain or return to baseline ADL function  Description: INTERVENTIONS:  -  Assess patient's ability to carry out ADLs; assess patient's baseline for ADL function and identify physical deficits which impact ability to perform ADLs (bathing, care of mouth/teeth, toileting, grooming, dressing, etc )  - Assess/evaluate cause of self-care deficits   - Assess range of motion  - Assess patient's mobility; develop plan if impaired  - Assess patient's need for assistive devices and provide as appropriate  - Encourage maximum independence but intervene and supervise when necessary  - Involve family in performance of ADLs  - Assess for home care needs following discharge   - Consider OT consult to assist with ADL evaluation and planning for discharge  - Provide patient education as appropriate  Outcome: Progressing  Goal: Maintains/Returns to pre admission functional level  Description: INTERVENTIONS:  - Perform BMAT or MOVE assessment daily    - Set and communicate daily mobility goal to care team and patient/family/caregiver  - Collaborate with rehabilitation services on mobility goals if consulted  - Perform Range of Motion 2 times a day  - Reposition patient every 2 hours  - Dangle patient 2 times a day  - Stand patient 2 times a day  - Ambulate patient 2 times a day  - Out of bed to chair 3 times a day   - Out of bed for meals 3 times a day  - Out of bed for toileting  - Record patient progress and toleration of activity level   Outcome: Progressing     Problem: Knowledge Deficit  Goal: Patient/family/caregiver demonstrates understanding of disease process, treatment plan, medications, and discharge instructions  Description: Complete learning assessment and assess knowledge base    Interventions:  - Provide teaching at level of understanding  - Provide teaching via preferred learning methods  Outcome: Progressing     Problem: MOBILITY - ADULT  Goal: Maintain or return to baseline ADL function  Description: INTERVENTIONS:  -  Assess patient's ability to carry out ADLs; assess patient's baseline for ADL function and identify physical deficits which impact ability to perform ADLs (bathing, care of mouth/teeth, toileting, grooming, dressing, etc )  - Assess/evaluate cause of self-care deficits   - Assess range of motion  - Assess patient's mobility; develop plan if impaired  - Assess patient's need for assistive devices and provide as appropriate  - Encourage maximum independence but intervene and supervise when necessary  - Involve family in performance of ADLs  - Assess for home care needs following discharge   - Consider OT consult to assist with ADL evaluation and planning for discharge  - Provide patient education as appropriate  Outcome: Progressing  Goal: Maintains/Returns to pre admission functional level  Description: INTERVENTIONS:  - Perform BMAT or MOVE assessment daily    - Set and communicate daily mobility goal to care team and patient/family/caregiver  - Collaborate with rehabilitation services on mobility goals if consulted  - Perform Range of Motion 2 times a day  - Reposition patient every 2 hours    - Dangle patient 2 times a day  - Stand patient 2 times a day  - Ambulate patient 2 times a day  - Out of bed to chair 3 times a day   - Out of bed for meals 3 times a day  - Out of bed for toileting  - Record patient progress and toleration of activity level   Outcome: Progressing     Problem: Prexisting or High Potential for Compromised Skin Integrity  Goal: Skin integrity is maintained or improved  Description: INTERVENTIONS:  - Identify patients at risk for skin breakdown  - Assess and monitor skin integrity  - Assess and monitor nutrition and hydration status  - Monitor labs   - Assess for incontinence   - Turn and reposition patient  - Assist with mobility/ambulation  - Relieve pressure over bony prominences  - Avoid friction and shearing  - Provide appropriate hygiene as needed including keeping skin clean and dry  - Evaluate need for skin moisturizer/barrier cream  - Collaborate with interdisciplinary team   - Patient/family teaching  - Consider wound care consult   Outcome: Progressing

## 2022-03-18 NOTE — DISCHARGE SUMMARY
[address]  Discharge- Lobo Romero 1990, 33 y o  female MRN: [id number]  Unit/Bed#: [address] [address] Encounter: [id number]  Primary Care Provider: Tasha Cordero MD   Date and time admitted to hospital: 3/15/2022  6:40 PM    No new Assessment & Plan notes have been filed under this hospital service since the last note was generated  Service: Hospitalist      Medical Problems             Resolved Problems  Date Reviewed: 3/15/2022          Resolved    Hypokalemia 3/18/2022     Resolved by  Jasvir Lovell DO              Discharging Physician / Practitioner: Jasvir Lovell DO  PCP: Tasha Cordero MD  Admission Date:   Admission Orders (From admission, onward)     Ordered        03/15/22 1940  Inpatient Admission  Once                      Discharge Date: 03/18/22    Consultations During Hospital Stay:  · Psychiatry     Significant Findings / Test Results:   CXR: No acute cardiopulmonary disease  Reason for Admission: Intentional overdose     Hospital Course:   Lobo Romero is a 33 y o  female patient who originally presented to the hospital on 3/15/2022 due to intentional overdose with xanax  Patient was evaluated by psychiatry and transfer to inpatient psychiatry facility was the recommendation which patient was agreeable to  Patient remains stable throughout remainder of hospitalization and was transferred to inpatient psychiatry facility  Please see above list of diagnoses and related plan for additional information  Condition at Discharge: guarded    Discharge Day Visit / Exam:   * Please refer to separate progress note for these details *      Discharge instructions/Information to patient and family:   See after visit summary for information provided to patient and family  Provisions for Follow-Up Care:  See after visit summary for information related to follow-up care and any pertinent home health orders         Disposition:   Inpatient Psychiatry at [facility]    Planned Readmission: none     Discharge Statement:  I spent greater than 30 minutes discharging the patient  This time was spent on the day of discharge  I had direct contact with the patient on the day of discharge  Greater than 50% of the total time was spent examining patient, answering all patient questions, arranging and discussing plan of care with patient as well as directly providing post-discharge instructions  Additional time then spent on discharge activities  Discharge Medications:  See after visit summary for reconciled discharge medications provided to patient and/or family        **Please Note: This note may have been constructed using a voice recognition system**

## 2022-03-18 NOTE — PLAN OF CARE
Problem: Potential for Falls  Goal: Patient will remain free of falls  Description: INTERVENTIONS:  - Educate patient/family on patient safety including physical limitations  - Instruct patient to call for assistance with activity   - Consult OT/PT to assist with strengthening/mobility   - Keep Call bell within reach  - Keep bed low and locked with side rails adjusted as appropriate  - Keep care items and personal belongings within reach  - Initiate and maintain comfort rounds  - Make Fall Risk Sign visible to staff  - Offer Toileting every 2 Hours, in advance of need  - Initiate/Maintain Bed/chair alarm  - Obtain necessary fall risk management equipment: Independent  - Apply yellow socks and bracelet for high fall risk patients  - Consider moving patient to room near nurses station  3/18/2022 1052 by Yary Rodrigues RN  Outcome: Completed  3/18/2022 1049 by Yary Rodrigues RN  Outcome: Progressing

## 2022-03-18 NOTE — UTILIZATION REVIEW
Notification of Discharge   This is a Notification of Discharge from our facility [address]  Please be advised that this patient has been discharge from our facility  Below you will find the admission and discharge date and time including the patients disposition  UTILIZATION REVIEW CONTACT:  Nehal Gandhi  Utilization   Network Utilization Review Department  Phone: [phone] x carefully listen to the prompts  All voicemails are confidential   Email: [email or web address]     PHYSICIAN ADVISORY SERVICES:  FOR [id number] REVIEW - MEDICAL NECESSITY DENIAL  Phone: [phone]  Fax: [phone]  Email: [email or web address]     PRESENTATION DATE: 3/15/2022  6:40 PM  OBERVATION ADMISSION DATE:   INPATIENT ADMISSION DATE: 3/15/22  7:40 PM   DISCHARGE DATE: 3/18/2022  8:43 AM  DISPOSITION: Non [facility] Psych Non [facility]      IMPORTANT INFORMATION:  Send all requests for admission clinical reviews, approved or denied determinations and any other requests to dedicated fax number below belonging to the campus where the patient is receiving treatment   List of dedicated fax numbers:  [address] [place] [address] DENIALS (Administrative/Medical Necessity) [phone]   [address]  (Maternity/NICU/Pediatrics) [phone]   Frances Montilla [phone]   [address] [place] [address] [phone]   [address] [place] [address] [phone]   [address] [place] [address],[address] [phone]   [facility]  [phone]   [address] [place] [address], [facility]  [address] [facility] [address] [facility] [phone]

## 2023-02-04 ENCOUNTER — APPOINTMENT (EMERGENCY)
Dept: RADIOLOGY | Facility: HOSPITAL | Age: 33
End: 2023-02-04

## 2023-02-04 ENCOUNTER — HOSPITAL ENCOUNTER (EMERGENCY)
Facility: HOSPITAL | Age: 33
Discharge: HOME | End: 2023-02-05
Attending: EMERGENCY MEDICINE

## 2023-02-04 DIAGNOSIS — K57.92 DIVERTICULITIS: Primary | ICD-10-CM

## 2023-02-04 LAB
ALBUMIN SERPL BCP-MCNC: 3.6 G/DL (ref 3.5–5)
ALP SERPL-CCNC: 97 U/L (ref 46–116)
ALT SERPL W P-5'-P-CCNC: 27 U/L (ref 12–78)
ANION GAP SERPL CALCULATED.3IONS-SCNC: 6 MMOL/L (ref 4–13)
AST SERPL W P-5'-P-CCNC: 21 U/L (ref 5–45)
BACTERIA UR QL AUTO: NORMAL /HPF
BASOPHILS # BLD AUTO: 0.06 THOUSANDS/ÂΜL (ref 0–0.1)
BASOPHILS NFR BLD AUTO: 1 % (ref 0–1)
BILIRUB SERPL-MCNC: 0.13 MG/DL (ref 0.2–1)
BILIRUB UR QL STRIP: NEGATIVE
BUN SERPL-MCNC: 4 MG/DL (ref 5–25)
CALCIUM SERPL-MCNC: 8.5 MG/DL (ref 8.3–10.1)
CHLORIDE SERPL-SCNC: 103 MMOL/L (ref 96–108)
CLARITY UR: CLEAR
CO2 SERPL-SCNC: 28 MMOL/L (ref 21–32)
COLOR UR: COLORLESS
CREAT SERPL-MCNC: 0.69 MG/DL (ref 0.6–1.3)
EOSINOPHIL # BLD AUTO: 0.21 THOUSAND/ÂΜL (ref 0–0.61)
EOSINOPHIL NFR BLD AUTO: 2 % (ref 0–6)
ERYTHROCYTE [DISTWIDTH] IN BLOOD BY AUTOMATED COUNT: 12.6 % (ref 11.6–15.1)
GFR SERPL CREATININE-BSD FRML MDRD: 116 ML/MIN/1.73SQ M
GLUCOSE SERPL-MCNC: 97 MG/DL (ref 65–140)
GLUCOSE UR STRIP-MCNC: NEGATIVE MG/DL
HCT VFR BLD AUTO: 40.9 % (ref 34.8–46.1)
HGB BLD-MCNC: 13.6 G/DL (ref 11.5–15.4)
HGB UR QL STRIP.AUTO: ABNORMAL
IMM GRANULOCYTES # BLD AUTO: 0.03 THOUSAND/UL (ref 0–0.2)
IMM GRANULOCYTES NFR BLD AUTO: 0 % (ref 0–2)
KETONES UR STRIP-MCNC: NEGATIVE MG/DL
LEUKOCYTE ESTERASE UR QL STRIP: NEGATIVE
LIPASE SERPL-CCNC: 61 U/L (ref 73–393)
LYMPHOCYTES # BLD AUTO: 2.57 THOUSANDS/ÂΜL (ref 0.6–4.47)
LYMPHOCYTES NFR BLD AUTO: 26 % (ref 14–44)
MCH RBC QN AUTO: 30.8 PG (ref 26.8–34.3)
MCHC RBC AUTO-ENTMCNC: 33.3 G/DL (ref 31.4–37.4)
MCV RBC AUTO: 93 FL (ref 82–98)
MONOCYTES # BLD AUTO: 0.54 THOUSAND/ÂΜL (ref 0.17–1.22)
MONOCYTES NFR BLD AUTO: 5 % (ref 4–12)
NEUTROPHILS # BLD AUTO: 6.58 THOUSANDS/ÂΜL (ref 1.85–7.62)
NEUTS SEG NFR BLD AUTO: 66 % (ref 43–75)
NITRITE UR QL STRIP: NEGATIVE
NON-SQ EPI CELLS URNS QL MICRO: NORMAL /HPF
NRBC BLD AUTO-RTO: 0 /100 WBCS
PH UR STRIP.AUTO: 6 [PH]
PLATELET # BLD AUTO: 333 THOUSANDS/UL (ref 149–390)
PMV BLD AUTO: 9 FL (ref 8.9–12.7)
POTASSIUM SERPL-SCNC: 3.5 MMOL/L (ref 3.5–5.3)
PROT SERPL-MCNC: 6.6 G/DL (ref 6.4–8.4)
PROT UR STRIP-MCNC: NEGATIVE MG/DL
RBC # BLD AUTO: 4.41 MILLION/UL (ref 3.81–5.12)
RBC #/AREA URNS AUTO: NORMAL /HPF
SODIUM SERPL-SCNC: 137 MMOL/L (ref 135–147)
SP GR UR STRIP.AUTO: <=1.005 (ref 1–1.03)
UROBILINOGEN UR QL STRIP.AUTO: 0.2 E.U./DL
WBC # BLD AUTO: 9.99 THOUSAND/UL (ref 4.31–10.16)
WBC #/AREA URNS AUTO: NORMAL /HPF

## 2023-02-04 RX ORDER — ROPINIROLE 0.5 MG/1
0.5 TABLET, FILM COATED ORAL 2 TIMES DAILY
COMMUNITY

## 2023-02-04 RX ORDER — ONDANSETRON 2 MG/ML
4 INJECTION INTRAMUSCULAR; INTRAVENOUS ONCE
Status: COMPLETED | OUTPATIENT
Start: 2023-02-04 | End: 2023-02-04

## 2023-02-04 RX ORDER — DICYCLOMINE HYDROCHLORIDE 10 MG/1
20 CAPSULE ORAL ONCE
Status: COMPLETED | OUTPATIENT
Start: 2023-02-04 | End: 2023-02-04

## 2023-02-04 RX ORDER — ZIPRASIDONE HYDROCHLORIDE 60 MG/1
60 CAPSULE ORAL DAILY
COMMUNITY

## 2023-02-04 RX ORDER — KETOROLAC TROMETHAMINE 30 MG/ML
15 INJECTION, SOLUTION INTRAMUSCULAR; INTRAVENOUS ONCE
Status: COMPLETED | OUTPATIENT
Start: 2023-02-04 | End: 2023-02-04

## 2023-02-04 RX ORDER — HYDROXYZINE HYDROCHLORIDE 25 MG/1
25 TABLET, FILM COATED ORAL 3 TIMES DAILY
COMMUNITY

## 2023-02-04 RX ORDER — ACETAMINOPHEN 325 MG/1
975 TABLET ORAL ONCE
Status: COMPLETED | OUTPATIENT
Start: 2023-02-04 | End: 2023-02-04

## 2023-02-04 RX ADMIN — SODIUM CHLORIDE 1000 ML: 0.9 INJECTION, SOLUTION INTRAVENOUS at 21:25

## 2023-02-04 RX ADMIN — ONDANSETRON 4 MG: 2 INJECTION INTRAMUSCULAR; INTRAVENOUS at 23:06

## 2023-02-04 RX ADMIN — ACETAMINOPHEN 975 MG: 325 TABLET, FILM COATED ORAL at 23:07

## 2023-02-04 RX ADMIN — DICYCLOMINE HYDROCHLORIDE 20 MG: 10 CAPSULE ORAL at 23:07

## 2023-02-04 RX ADMIN — IOHEXOL 100 ML: 350 INJECTION, SOLUTION INTRAVENOUS at 23:35

## 2023-02-04 RX ADMIN — KETOROLAC TROMETHAMINE 15 MG: 30 INJECTION, SOLUTION INTRAMUSCULAR at 23:07

## 2023-02-05 VITALS
RESPIRATION RATE: 20 BRPM | TEMPERATURE: 97.2 F | WEIGHT: 209 LBS | HEART RATE: 62 BPM | OXYGEN SATURATION: 99 % | DIASTOLIC BLOOD PRESSURE: 70 MMHG | BODY MASS INDEX: 34.78 KG/M2 | SYSTOLIC BLOOD PRESSURE: 102 MMHG

## 2023-02-05 RX ORDER — ACETAMINOPHEN 500 MG
1000 TABLET ORAL EVERY 8 HOURS PRN
Qty: 40 TABLET | Refills: 0 | Status: SHIPPED | OUTPATIENT
Start: 2023-02-05 | End: 2023-02-20

## 2023-02-05 RX ORDER — ONDANSETRON 4 MG/1
4 TABLET, ORALLY DISINTEGRATING ORAL EVERY 8 HOURS PRN
Qty: 20 TABLET | Refills: 0 | Status: SHIPPED | OUTPATIENT
Start: 2023-02-05

## 2023-02-05 RX ORDER — AMOXICILLIN AND CLAVULANATE POTASSIUM 875; 125 MG/1; MG/1
1 TABLET, FILM COATED ORAL ONCE
Status: COMPLETED | OUTPATIENT
Start: 2023-02-05 | End: 2023-02-05

## 2023-02-05 RX ORDER — AMOXICILLIN AND CLAVULANATE POTASSIUM 875; 125 MG/1; MG/1
1 TABLET, FILM COATED ORAL EVERY 12 HOURS
Qty: 14 TABLET | Refills: 0 | Status: SHIPPED | OUTPATIENT
Start: 2023-02-05 | End: 2023-02-12

## 2023-02-05 RX ORDER — DICYCLOMINE HCL 20 MG
20 TABLET ORAL 2 TIMES DAILY
Qty: 20 TABLET | Refills: 0 | Status: SHIPPED | OUTPATIENT
Start: 2023-02-05

## 2023-02-05 RX ADMIN — AMOXICILLIN AND CLAVULANATE POTASSIUM 1 TABLET: 875; 125 TABLET, FILM COATED ORAL at 01:32

## 2023-02-05 NOTE — ED CARE HANDOFF
Emergency Department Sign Out Note        Sign out and transfer of care from [address] [place] [address]  See Separate Emergency Department note  The patient, Aranza Davey, was evaluated by the previous provider for abd pain       Workup Completed:  Results Reviewed     Procedure Component Value Units Date/Time    Comprehensive metabolic panel [[id number]]  (Abnormal) Collected: 02/04/23 2123    Lab Status: Final result Specimen: Blood from Arm, Right Updated: 02/04/23 2151     Sodium 137 mmol/L      Potassium 3 5 mmol/L      Chloride 103 mmol/L      CO2 28 mmol/L      ANION GAP 6 mmol/L      BUN 4 mg/dL      Creatinine 0 69 mg/dL      Glucose 97 mg/dL      Calcium 8 5 mg/dL      AST 21 U/L      ALT 27 U/L      Alkaline Phosphatase 97 U/L      Total Protein 6 6 g/dL      Albumin 3 6 g/dL      Total Bilirubin 0 13 mg/dL      eGFR 116 ml/min/1 73sq m     Narrative:      [place] guidelines for Chronic Kidney Disease (CKD):   •  Stage 1 with normal or high GFR (GFR > 90 mL/min/1 73 square meters)  •  Stage 2 Mild CKD (GFR = 60-89 mL/min/1 73 square meters)  •  Stage 3A Moderate CKD (GFR = 45-59 mL/min/1 73 square meters)  •  Stage 3B Moderate CKD (GFR = 30-44 mL/min/1 73 square meters)  •  Stage 4 Severe CKD (GFR = 15-29 mL/min/1 73 square meters)  •  Stage 5 End Stage CKD (GFR <15 mL/min/1 73 square meters)  Note: GFR calculation is accurate only with a steady state creatinine    Lipase [[id number]]  (Abnormal) Collected: 02/04/23 2123    Lab Status: Final result Specimen: Blood from Arm, Right Updated: 02/04/23 2151     Lipase 61 u/L     Urine Microscopic [[id number]]  (Normal) Collected: 02/04/23 2123    Lab Status: Final result Specimen: Urine, Clean Catch Updated: 02/04/23 2150     RBC, UA 1-2 /hpf      WBC, UA None Seen /hpf      Epithelial Cells Occasional /hpf      Bacteria, UA Occasional /hpf     UA (URINE) with reflex to Scope [[id number]]  (Abnormal) Collected: 02/04/23 2123    Lab Status: Final result Specimen: Urine, Clean Catch Updated: 02/04/23 2140     Color, UA Colorless     Clarity, UA Clear     Specific Gravity, UA <=1 005     pH, UA 6 0     Leukocytes, UA Negative     Nitrite, UA Negative     Protein, UA Negative mg/dl      Glucose, UA Negative mg/dl      Ketones, UA Negative mg/dl      Urobilinogen, UA 0 2 E U /dl      Bilirubin, UA Negative     Occult Blood, UA Small    CBC and differential [[id number]] Collected: 02/04/23 2123    Lab Status: Final result Specimen: Blood from Arm, Right Updated: 02/04/23 2133     WBC 9 99 Thousand/uL      RBC 4 41 Million/uL      Hemoglobin 13 6 g/dL      Hematocrit 40 9 %      MCV 93 fL      MCH 30 8 pg      MCHC 33 3 g/dL      RDW 12 6 %      MPV 9 0 fL      Platelets 531 Thousands/uL      nRBC 0 /100 WBCs      Neutrophils Relative 66 %      Immat GRANS % 0 %      Lymphocytes Relative 26 %      Monocytes Relative 5 %      Eosinophils Relative 2 %      Basophils Relative 1 %      Neutrophils Absolute 6 58 Thousands/µL      Immature Grans Absolute 0 03 Thousand/uL      Lymphocytes Absolute 2 57 Thousands/µL      Monocytes Absolute 0 54 Thousand/µL      Eosinophils Absolute 0 21 Thousand/µL      Basophils Absolute 0 06 Thousands/µL             CT abdomen pelvis w contrast   Final Result by Charlene Mcmahan DO (02/05 0102)      Colonic diverticulosis with inflammatory changes surrounding the descending colon  Findings likely represent acute diverticulitis  No drainable fluid collection  If not already performed recently, colonoscopy after the acute illness is recommended to rule out possibility of colon cancer mimicking diverticulitis  Thickening of the urinary bladder wall which may represent cystitis  Follow-up with urology  The study was marked in [facility] for immediate notification              Workstation performed: [id number]               ED Course / Workup Pending (followup):  CT results show acute uncomplicated divertic  paitne'ts pain controlled with meds given    Offer admission vs d/c depending on symptoms, and she opts for later  tx w/ augmentin                                      Procedures  MDM        Disposition  Final diagnoses:   Diverticulitis     Time reflects when diagnosis was documented in both MDM as applicable and the Disposition within this note     Time User Action Codes Description Comment    2/5/2023  1:25 AM Dickson Moon Add [K57 92] Diverticulitis       ED Disposition     ED Disposition   Discharge    Condition   Stable    Date/Time   Sun Feb 5, 2023  1:25 AM    Comment   Israel Monroe discharge to home/self care                 Follow-up Information     Follow up With Specialties Details Why Contact Info Additional [facility]   [employer]  [address] [facility]  [phone]  Aidan Casarez [address] [facility], [facility] [employer], [address], [address]       [phone]        Patient's Medications   Discharge Prescriptions    ACETAMINOPHEN (TYLENOL) 500 MG TABLET    Take 2 tablets (1,000 mg total) by mouth every 8 (eight) hours as needed for mild pain for up to 15 days       Start Date: 2/5/2023  End Date: 2/20/2023       Order Dose: 1,000 mg       Quantity: 40 tablet    Refills: 0    AMOXICILLIN-CLAVULANATE (AUGMENTIN) 875-125 MG PER TABLET    Take 1 tablet by mouth every 12 (twelve) hours for 7 days       Start Date: 2/5/2023  End Date: 2/12/2023       Order Dose: 1 tablet       Quantity: 14 tablet    Refills: 0    DICYCLOMINE (BENTYL) 20 MG TABLET    Take 1 tablet (20 mg total) by mouth 2 (two) times a day       Start Date: 2/5/2023  End Date: --       Order Dose: 20 mg       Quantity: 20 tablet    Refills: 0    ONDANSETRON (ZOFRAN-ODT) 4 MG DISINTEGRATING TABLET    Take 1 tablet (4 mg total) by mouth every 8 (eight) hours as needed for nausea or vomiting       Start Date: 2/5/2023  End Date: -- Order Dose: 4 mg       Quantity: 20 tablet    Refills: 0     No discharge procedures on file         ED Provider  Electronically Signed by     Neftali Churchill MD  02/09/23 7516

## 2023-02-05 NOTE — ED PROVIDER NOTES
History  Chief Complaint   Patient presents with   • Abdominal Pain     LLQ pain for three days  Assoc with nausea, states normally has loose stools     Patient states he is noted abdominal discomfort in the left lower quadrant started insidiously about 3 days prior to arrival   States associated with some nausea, no vomiting  No fever or chills  Patient states she has had loose stools however she normally has some loosening of the stools  No blood per rectum  Patient has no prior history of colonic problems including colitis or diverticulitis  She has had her gallbladder out  Prior to Admission Medications   Prescriptions Last Dose Informant Patient Reported? Taking? Melatonin 10 MG TABS   Yes No   Sig: Take 10 mg by mouth daily at bedtime   desvenlafaxine (PRISTIQ) 100 mg 24 hr tablet   Yes No   Sig: Take 100 mg by mouth 2 (two) times a day   famotidine (PEPCID) 20 mg tablet   No No   Sig: Take 1 tablet (20 mg total) by mouth 2 (two) times a day for 7 days   hydrOXYzine HCL (ATARAX) 25 mg tablet   Yes Yes   Sig: Take 25 mg by mouth 3 (three) times a day   nicotine (NICODERM CQ) 21 mg/24 hr TD 24 hr patch Not Taking  No No   Sig: Place 1 patch on the skin daily   Patient not taking: Reported on 2/4/2023   rOPINIRole (REQUIP) 0 5 mg tablet   Yes Yes   Sig: Take 0 5 mg by mouth 2 (two) times a day   ziprasidone (GEODON) 60 mg capsule   Yes Yes   Sig: Take 60 mg by mouth in the morning      Facility-Administered Medications: None       Past Medical History:   Diagnosis Date   • Anxiety disorder    • Bipolar 1 disorder (HCC)    • Hypertension    • PTSD (post-traumatic stress disorder)        Past Surgical History:   Procedure Laterality Date   • CHOLECYSTECTOMY         History reviewed  No pertinent family history  I have reviewed and agree with the history as documented      E-Cigarette/Vaping   • E-Cigarette Use Never User      E-Cigarette/Vaping Substances   • Nicotine No    • THC No    • CBD No    • Flavoring No    • Other No    • Unknown No      Social History     Tobacco Use   • Smoking status: Every Day     Packs/day: 2 00     Types: Cigarettes   • Smokeless tobacco: Never   Vaping Use   • Vaping Use: Never used   Substance Use Topics   • Alcohol use: Never   • Drug use: No       Review of Systems   Constitutional: Positive for appetite change  Negative for chills and fever  HENT: Negative for congestion  Eyes: Negative for visual disturbance  Respiratory: Negative for cough and shortness of breath  Cardiovascular: Negative for chest pain  Gastrointestinal: Positive for abdominal pain, diarrhea and nausea  Negative for abdominal distention, anal bleeding, blood in stool and vomiting  Genitourinary: Negative for difficulty urinating and dysuria  Musculoskeletal: Negative for back pain  Skin: Negative for rash  Neurological: Negative for weakness and headaches  Hematological: Does not bruise/bleed easily  Psychiatric/Behavioral: Negative for confusion  All other systems reviewed and are negative  Physical Exam  Physical Exam  Vitals and nursing note reviewed  Constitutional:       Appearance: She is well-developed  HENT:      Head: Normocephalic  Mouth/Throat:      Mouth: Mucous membranes are moist    Eyes:      Extraocular Movements: Extraocular movements intact  Cardiovascular:      Rate and Rhythm: Normal rate and regular rhythm  Heart sounds: Normal heart sounds  Pulmonary:      Effort: Pulmonary effort is normal    Abdominal:      General: Abdomen is flat  Bowel sounds are normal       Palpations: Abdomen is soft  Tenderness: There is abdominal tenderness in the left lower quadrant  There is no guarding or rebound  Skin:     General: Skin is warm and dry  Capillary Refill: Capillary refill takes less than 2 seconds  Neurological:      General: No focal deficit present  Mental Status: She is alert and oriented to person, place, and time  Psychiatric:         Mood and Affect: Mood normal          Behavior: Behavior normal          Vital Signs  ED Triage Vitals [02/04/23 2053]   Temperature Pulse Respirations Blood Pressure SpO2   (!) 97 2 °F (36 2 °C) 81 20 141/81 100 %      Temp Source Heart Rate Source Patient Position - Orthostatic VS BP Location FiO2 (%)   Tympanic Monitor Sitting Right arm --      Pain Score       7           Vitals:    02/04/23 2200 02/04/23 2245 02/05/23 0030 02/05/23 0100   BP: 119/69 127/75 111/55 102/70   Pulse: 66 68 58 62   Patient Position - Orthostatic VS: Lying            Visual Acuity      ED Medications  Medications   sodium chloride 0 9 % bolus 1,000 mL (0 mL Intravenous Stopped 2/5/23 0006)   acetaminophen (TYLENOL) tablet 975 mg (975 mg Oral Given 2/4/23 2307)   ketorolac (TORADOL) injection 15 mg (15 mg Intravenous Given 2/4/23 2307)   ondansetron (ZOFRAN) injection 4 mg (4 mg Intravenous Given 2/4/23 2306)   dicyclomine (BENTYL) capsule 20 mg (20 mg Oral Given 2/4/23 2307)   iohexol (OMNIPAQUE) 350 MG/ML injection (SINGLE-DOSE) 100 mL (100 mL Intravenous Given 2/4/23 2335)   amoxicillin-clavulanate (AUGMENTIN) 875-125 mg per tablet 1 tablet (1 tablet Oral Given 2/5/23 0132)       Diagnostic Studies  Results Reviewed     Procedure Component Value Units Date/Time    Comprehensive metabolic panel [[id number]]  (Abnormal) Collected: 02/04/23 2123    Lab Status: Final result Specimen: Blood from Arm, Right Updated: 02/04/23 2151     Sodium 137 mmol/L      Potassium 3 5 mmol/L      Chloride 103 mmol/L      CO2 28 mmol/L      ANION GAP 6 mmol/L      BUN 4 mg/dL      Creatinine 0 69 mg/dL      Glucose 97 mg/dL      Calcium 8 5 mg/dL      AST 21 U/L      ALT 27 U/L      Alkaline Phosphatase 97 U/L      Total Protein 6 6 g/dL      Albumin 3 6 g/dL      Total Bilirubin 0 13 mg/dL      eGFR 116 ml/min/1 73sq m     Narrative:      [place] guidelines for Chronic Kidney Disease (CKD):   •  Stage 1 with normal or high GFR (GFR > 90 mL/min/1 73 square meters)  •  Stage 2 Mild CKD (GFR = 60-89 mL/min/1 73 square meters)  •  Stage 3A Moderate CKD (GFR = 45-59 mL/min/1 73 square meters)  •  Stage 3B Moderate CKD (GFR = 30-44 mL/min/1 73 square meters)  •  Stage 4 Severe CKD (GFR = 15-29 mL/min/1 73 square meters)  •  Stage 5 End Stage CKD (GFR <15 mL/min/1 73 square meters)  Note: GFR calculation is accurate only with a steady state creatinine    Lipase [[id number]]  (Abnormal) Collected: 02/04/23 2123    Lab Status: Final result Specimen: Blood from Arm, Right Updated: 02/04/23 2151     Lipase 61 u/L     Urine Microscopic [[id number]]  (Normal) Collected: 02/04/23 2123    Lab Status: Final result Specimen: Urine, Clean Catch Updated: 02/04/23 2150     RBC, UA 1-2 /hpf      WBC, UA None Seen /hpf      Epithelial Cells Occasional /hpf      Bacteria, UA Occasional /hpf     UA (URINE) with reflex to Scope [[id number]]  (Abnormal) Collected: 02/04/23 2123    Lab Status: Final result Specimen: Urine, Clean Catch Updated: 02/04/23 2140     Color, UA Colorless     Clarity, UA Clear     Specific Gravity, UA <=1 005     pH, UA 6 0     Leukocytes, UA Negative     Nitrite, UA Negative     Protein, UA Negative mg/dl      Glucose, UA Negative mg/dl      Ketones, UA Negative mg/dl      Urobilinogen, UA 0 2 E U /dl      Bilirubin, UA Negative     Occult Blood, UA Small    CBC and differential [[id number]] Collected: 02/04/23 2123    Lab Status: Final result Specimen: Blood from Arm, Right Updated: 02/04/23 2133     WBC 9 99 Thousand/uL      RBC 4 41 Million/uL      Hemoglobin 13 6 g/dL      Hematocrit 40 9 %      MCV 93 fL      MCH 30 8 pg      MCHC 33 3 g/dL      RDW 12 6 %      MPV 9 0 fL      Platelets 872 Thousands/uL      nRBC 0 /100 WBCs      Neutrophils Relative 66 %      Immat GRANS % 0 %      Lymphocytes Relative 26 %      Monocytes Relative 5 %      Eosinophils Relative 2 %      Basophils Relative 1 %      Neutrophils Absolute 6 58 Thousands/µL      Immature Grans Absolute 0 03 Thousand/uL      Lymphocytes Absolute 2 57 Thousands/µL      Monocytes Absolute 0 54 Thousand/µL      Eosinophils Absolute 0 21 Thousand/µL      Basophils Absolute 0 06 Thousands/µL                  CT abdomen pelvis w contrast   Final Result by Maisha Gonzáles DO (02/05 0102)      Colonic diverticulosis with inflammatory changes surrounding the descending colon  Findings likely represent acute diverticulitis  No drainable fluid collection  If not already performed recently, colonoscopy after the acute illness is recommended to rule out possibility of colon cancer mimicking diverticulitis  Thickening of the urinary bladder wall which may represent cystitis  Follow-up with urology  The study was marked in [facility] for immediate notification  Workstation performed: [id number]                    Procedures  Procedures         ED Course                               SBIRT 20yo+    Flowsheet Row Most Recent Value   SBIRT (23 yo +)    In order to provide better care to our patients, we are screening all of our patients for alcohol and drug use  Would it be okay to ask you these screening questions? No Filed at: 02/04/2023 2128                    Medical Decision Making  Mild tenderness without peritoneal signs  Check for possible intra-abdominal process, CT scan    Diverticulitis: acute illness or injury  Amount and/or Complexity of Data Reviewed  Labs: ordered  Radiology: ordered  Risk  OTC drugs  Prescription drug management            Disposition  Final diagnoses:   Diverticulitis     Time reflects when diagnosis was documented in both MDM as applicable and the Disposition within this note     Time User Action Codes Description Comment    2/5/2023  1:25 AM Noel Landrum Add [K57 92] Diverticulitis       ED Disposition     ED Disposition   Discharge    Condition   Stable    Date/Time   Sun Feb 5, 2023  1:25 AM    Comment [address] discharge to home/self care                 Follow-up Information     Follow up With Specialties Details Why Contact Info Additional [facility]   [address]  [address] [facility]  [id number]  Aidan Casarez [address] [facility], [address], [facility] [address], [place], [place], Matthew Paez [address]          Discharge Medication List as of 2/5/2023  1:26 AM      START taking these medications    Details   acetaminophen (TYLENOL) 500 mg tablet Take 2 tablets (1,000 mg total) by mouth every 8 (eight) hours as needed for mild pain for up to 15 days, Starting Sun 2/5/2023, Until Mon 2/20/2023 at 2359, Normal      amoxicillin-clavulanate (AUGMENTIN) 875-125 mg per tablet Take 1 tablet by mouth every 12 (twelve) hours for 7 days, Starting Sun 2/5/2023, Until Sun 2/12/2023, Normal      dicyclomine (BENTYL) 20 mg tablet Take 1 tablet (20 mg total) by mouth 2 (two) times a day, Starting Sun 2/5/2023, Normal      ondansetron (ZOFRAN-ODT) 4 mg disintegrating tablet Take 1 tablet (4 mg total) by mouth every 8 (eight) hours as needed for nausea or vomiting, Starting Sun 2/5/2023, Normal         CONTINUE these medications which have NOT CHANGED    Details   hydrOXYzine HCL (ATARAX) 25 mg tablet Take 25 mg by mouth 3 (three) times a day, Historical Med      rOPINIRole (REQUIP) 0 5 mg tablet Take 0 5 mg by mouth 2 (two) times a day, Historical Med      ziprasidone (GEODON) 60 mg capsule Take 60 mg by mouth in the morning, Historical Med      desvenlafaxine (PRISTIQ) 100 mg 24 hr tablet Take 100 mg by mouth 2 (two) times a day, Historical Med      famotidine (PEPCID) 20 mg tablet Take 1 tablet (20 mg total) by mouth 2 (two) times a day for 7 days, Starting Sat 5/22/2021, Until Sat 5/29/2021, Normal      Melatonin 10 MG TABS Take 10 mg by mouth daily at bedtime, Historical Med      nicotine (NICODERM CQ) 21 mg/24 hr TD 24 hr patch Place 1 patch on the skin daily, Starting Fri 3/18/2022, Normal             No discharge procedures on file      PDMP Review     None          ED Provider  Electronically Signed by           Ary Carson MD  02/05/23 2064

## 2023-02-12 ENCOUNTER — HOSPITAL ENCOUNTER (EMERGENCY)
Facility: HOSPITAL | Age: 33
Discharge: HOME | End: 2023-02-12
Attending: EMERGENCY MEDICINE

## 2023-02-12 VITALS
BODY MASS INDEX: 33.78 KG/M2 | TEMPERATURE: 97 F | RESPIRATION RATE: 20 BRPM | WEIGHT: 203 LBS | OXYGEN SATURATION: 99 % | HEART RATE: 76 BPM | DIASTOLIC BLOOD PRESSURE: 61 MMHG | SYSTOLIC BLOOD PRESSURE: 126 MMHG

## 2023-02-12 DIAGNOSIS — R19.7 DIARRHEA: ICD-10-CM

## 2023-02-12 DIAGNOSIS — R11.10 VOMITING: Primary | ICD-10-CM

## 2023-02-12 LAB
ALBUMIN SERPL BCP-MCNC: 4.1 G/DL (ref 3.5–5)
ALP SERPL-CCNC: 101 U/L (ref 46–116)
ALT SERPL W P-5'-P-CCNC: 41 U/L (ref 12–78)
ANION GAP SERPL CALCULATED.3IONS-SCNC: 10 MMOL/L (ref 4–13)
AST SERPL W P-5'-P-CCNC: 35 U/L (ref 5–45)
BACTERIA UR QL AUTO: ABNORMAL /HPF
BASOPHILS # BLD AUTO: 0.03 THOUSANDS/ÂΜL (ref 0–0.1)
BASOPHILS NFR BLD AUTO: 0 % (ref 0–1)
BILIRUB SERPL-MCNC: 0.35 MG/DL (ref 0.2–1)
BILIRUB UR QL STRIP: NEGATIVE
BUN SERPL-MCNC: 2 MG/DL (ref 5–25)
CALCIUM SERPL-MCNC: 9 MG/DL (ref 8.3–10.1)
CHLORIDE SERPL-SCNC: 101 MMOL/L (ref 96–108)
CLARITY UR: CLEAR
CO2 SERPL-SCNC: 28 MMOL/L (ref 21–32)
COLOR UR: ABNORMAL
CREAT SERPL-MCNC: 0.8 MG/DL (ref 0.6–1.3)
EOSINOPHIL # BLD AUTO: 0.08 THOUSAND/ÂΜL (ref 0–0.61)
EOSINOPHIL NFR BLD AUTO: 1 % (ref 0–6)
ERYTHROCYTE [DISTWIDTH] IN BLOOD BY AUTOMATED COUNT: 13 % (ref 11.6–15.1)
EXT PREGNANCY TEST URINE: NEGATIVE
EXT. CONTROL: NORMAL
GFR SERPL CREATININE-BSD FRML MDRD: 98 ML/MIN/1.73SQ M
GLUCOSE SERPL-MCNC: 95 MG/DL (ref 65–140)
GLUCOSE UR STRIP-MCNC: NEGATIVE MG/DL
HCT VFR BLD AUTO: 44.5 % (ref 34.8–46.1)
HGB BLD-MCNC: 15.2 G/DL (ref 11.5–15.4)
HGB UR QL STRIP.AUTO: ABNORMAL
IMM GRANULOCYTES # BLD AUTO: 0.03 THOUSAND/UL (ref 0–0.2)
IMM GRANULOCYTES NFR BLD AUTO: 0 % (ref 0–2)
KETONES UR STRIP-MCNC: NEGATIVE MG/DL
LEUKOCYTE ESTERASE UR QL STRIP: NEGATIVE
LIPASE SERPL-CCNC: 80 U/L (ref 73–393)
LYMPHOCYTES # BLD AUTO: 1.36 THOUSANDS/ÂΜL (ref 0.6–4.47)
LYMPHOCYTES NFR BLD AUTO: 13 % (ref 14–44)
MCH RBC QN AUTO: 31.2 PG (ref 26.8–34.3)
MCHC RBC AUTO-ENTMCNC: 34.2 G/DL (ref 31.4–37.4)
MCV RBC AUTO: 91 FL (ref 82–98)
MONOCYTES # BLD AUTO: 0.64 THOUSAND/ÂΜL (ref 0.17–1.22)
MONOCYTES NFR BLD AUTO: 6 % (ref 4–12)
NEUTROPHILS # BLD AUTO: 8.32 THOUSANDS/ÂΜL (ref 1.85–7.62)
NEUTS SEG NFR BLD AUTO: 80 % (ref 43–75)
NITRITE UR QL STRIP: NEGATIVE
NON-SQ EPI CELLS URNS QL MICRO: ABNORMAL /HPF
NRBC BLD AUTO-RTO: 0 /100 WBCS
PH UR STRIP.AUTO: 7 [PH]
PLATELET # BLD AUTO: 342 THOUSANDS/UL (ref 149–390)
PMV BLD AUTO: 9.4 FL (ref 8.9–12.7)
POTASSIUM SERPL-SCNC: 3.3 MMOL/L (ref 3.5–5.3)
PROT SERPL-MCNC: 7.4 G/DL (ref 6.4–8.4)
PROT UR STRIP-MCNC: NEGATIVE MG/DL
RBC # BLD AUTO: 4.87 MILLION/UL (ref 3.81–5.12)
RBC #/AREA URNS AUTO: ABNORMAL /HPF
SODIUM SERPL-SCNC: 139 MMOL/L (ref 135–147)
SP GR UR STRIP.AUTO: <=1.005 (ref 1–1.03)
UROBILINOGEN UR QL STRIP.AUTO: 0.2 E.U./DL
WBC # BLD AUTO: 10.46 THOUSAND/UL (ref 4.31–10.16)
WBC #/AREA URNS AUTO: ABNORMAL /HPF

## 2023-02-12 RX ORDER — ONDANSETRON 2 MG/ML
4 INJECTION INTRAMUSCULAR; INTRAVENOUS ONCE
Status: COMPLETED | OUTPATIENT
Start: 2023-02-12 | End: 2023-02-12

## 2023-02-12 RX ORDER — POTASSIUM CHLORIDE 20 MEQ/1
40 TABLET, EXTENDED RELEASE ORAL ONCE
Status: COMPLETED | OUTPATIENT
Start: 2023-02-12 | End: 2023-02-12

## 2023-02-12 RX ORDER — METOCLOPRAMIDE HYDROCHLORIDE 5 MG/ML
10 INJECTION INTRAMUSCULAR; INTRAVENOUS ONCE
Status: COMPLETED | OUTPATIENT
Start: 2023-02-12 | End: 2023-02-12

## 2023-02-12 RX ADMIN — METOCLOPRAMIDE 10 MG: 5 INJECTION, SOLUTION INTRAMUSCULAR; INTRAVENOUS at 19:31

## 2023-02-12 RX ADMIN — POTASSIUM CHLORIDE 40 MEQ: 1500 TABLET, EXTENDED RELEASE ORAL at 19:31

## 2023-02-12 RX ADMIN — ONDANSETRON 4 MG: 2 INJECTION INTRAMUSCULAR; INTRAVENOUS at 18:04

## 2023-02-12 RX ADMIN — SODIUM CHLORIDE 1000 ML: 0.9 INJECTION, SOLUTION INTRAVENOUS at 18:02

## 2023-02-12 RX ADMIN — SODIUM CHLORIDE 1000 ML: 0.9 INJECTION, SOLUTION INTRAVENOUS at 19:30

## 2023-02-12 NOTE — ED PROVIDER NOTES
History  Chief Complaint   Patient presents with   • Vomiting     Arrives hyperventilating, states she will pass out  Skin is pink  States started during night with vomiting and diarrhea and her stomach burns  States she took Zofran x 5 doses, last 2 hours ago  States she feels unwell because she vomited her psych meds  Patient was recently seen by me with lower abdominal pain  She was diagnosed with acute diverticulitis and has been on antibiotics with marked improvement  Patient states her symptoms had resolved but when she was taking her last dose of Augmentin she developed upset stomach and diarrhea  No fever  Patient decided to treat the diarrhea with Imodium  Patient states that caused her to get worse  She developed abdominal distention and then vomiting although the diarrhea has improved  Patient is quite anxious, and thinks it is because she has been vomiting her psych meds  Patient is hyperventilating on arrival          Prior to Admission Medications   Prescriptions Last Dose Informant Patient Reported? Taking?    Melatonin 10 MG TABS   Yes No   Sig: Take 10 mg by mouth daily at bedtime   acetaminophen (TYLENOL) 500 mg tablet   No No   Sig: Take 2 tablets (1,000 mg total) by mouth every 8 (eight) hours as needed for mild pain for up to 15 days   amoxicillin-clavulanate (AUGMENTIN) 875-125 mg per tablet   No No   Sig: Take 1 tablet by mouth every 12 (twelve) hours for 7 days   desvenlafaxine (PRISTIQ) 100 mg 24 hr tablet   Yes No   Sig: Take 100 mg by mouth 2 (two) times a day   dicyclomine (BENTYL) 20 mg tablet   No No   Sig: Take 1 tablet (20 mg total) by mouth 2 (two) times a day   famotidine (PEPCID) 20 mg tablet   No No   Sig: Take 1 tablet (20 mg total) by mouth 2 (two) times a day for 7 days   hydrOXYzine HCL (ATARAX) 25 mg tablet   Yes No   Sig: Take 25 mg by mouth 3 (three) times a day   nicotine (NICODERM CQ) 21 mg/24 hr TD 24 hr patch   No No   Sig: Place 1 patch on the skin daily Patient not taking: Reported on 2/4/2023   ondansetron (ZOFRAN-ODT) 4 mg disintegrating tablet   No No   Sig: Take 1 tablet (4 mg total) by mouth every 8 (eight) hours as needed for nausea or vomiting   rOPINIRole (REQUIP) 0 5 mg tablet   Yes No   Sig: Take 0 5 mg by mouth 2 (two) times a day   ziprasidone (GEODON) 60 mg capsule   Yes No   Sig: Take 60 mg by mouth in the morning      Facility-Administered Medications: None       Past Medical History:   Diagnosis Date   • Anxiety disorder    • Bipolar 1 disorder ([facility] [address] )    • Diverticulitis    • Hypertension    • PTSD (post-traumatic stress disorder)        Past Surgical History:   Procedure Laterality Date   • CHOLECYSTECTOMY         History reviewed  No pertinent family history  I have reviewed and agree with the history as documented  E-Cigarette/Vaping   • E-Cigarette Use Never User      E-Cigarette/Vaping Substances   • Nicotine No    • THC No    • CBD No    • Flavoring No    • Other No    • Unknown No      Social History     Tobacco Use   • Smoking status: Every Day     Packs/day: 2 00     Types: Cigarettes   • Smokeless tobacco: Never   Vaping Use   • Vaping Use: Never used   Substance Use Topics   • Alcohol use: Never   • Drug use: No       Review of Systems   Constitutional: Negative for chills and fever  HENT: Negative for congestion and sore throat  Respiratory: Negative for chest tightness and shortness of breath  Cardiovascular: Negative for chest pain  Gastrointestinal: Positive for abdominal pain, diarrhea, nausea and vomiting  Genitourinary: Negative for dysuria  Musculoskeletal: Negative for back pain  Skin: Negative for color change and rash  Neurological: Negative for headaches  Psychiatric/Behavioral: The patient is nervous/anxious  All other systems reviewed and are negative  Physical Exam  Physical Exam  Vitals and nursing note reviewed  Constitutional:       Appearance: Normal appearance     HENT:      Head: Normocephalic  Right Ear: External ear normal       Left Ear: External ear normal       Nose: Nose normal       Mouth/Throat:      Pharynx: Oropharynx is clear  Eyes:      Conjunctiva/sclera: Conjunctivae normal    Cardiovascular:      Rate and Rhythm: Regular rhythm  Tachycardia present  Pulses: Normal pulses  Pulmonary:      Effort: Pulmonary effort is normal       Comments: Tachypnea  Abdominal:      General: Bowel sounds are normal       Palpations: Abdomen is soft  Tenderness: There is abdominal tenderness  Musculoskeletal:         General: Normal range of motion  Cervical back: Normal range of motion and neck supple  Skin:     General: Skin is warm and dry  Capillary Refill: Capillary refill takes less than 2 seconds  Neurological:      General: No focal deficit present  Mental Status: She is alert and oriented to person, place, and time     Psychiatric:         Behavior: Behavior normal       Comments: Patient is quite anxious and hyperventilating         Vital Signs  ED Triage Vitals [02/12/23 1757]   Temperature Pulse Respirations Blood Pressure SpO2   (!) 97 °F (36 1 °C) 103 (!) 32 121/67 100 %      Temp Source Heart Rate Source Patient Position - Orthostatic VS BP Location FiO2 (%)   Temporal Monitor Sitting Left arm --      Pain Score       --           Vitals:    02/12/23 1757 02/12/23 1857   BP: 121/67 126/61   Pulse: 103 76   Patient Position - Orthostatic VS: Sitting Lying         Visual Acuity      ED Medications  Medications   sodium chloride 0 9 % bolus 1,000 mL (1,000 mL Intravenous New Bag 2/12/23 1930)   sodium chloride 0 9 % bolus 1,000 mL (0 mL Intravenous Stopped 2/12/23 1902)   ondansetron (ZOFRAN) injection 4 mg (4 mg Intravenous Given 2/12/23 1804)   metoclopramide (REGLAN) injection 10 mg (10 mg Intravenous Given 2/12/23 1931)   potassium chloride (K-DUR,KLOR-CON) CR tablet 40 mEq (40 mEq Oral Given 2/12/23 1931)       Diagnostic Studies  Results Reviewed     Procedure Component Value Units Date/Time    UA (URINE) with reflex to Scope [[id number]]  (Abnormal) Collected: 02/12/23 1927    Lab Status: Final result Specimen: Urine, Clean Catch Updated: 02/12/23 1935     Color, UA Light Yellow     Clarity, UA Clear     Specific Gravity, UA <=1 005     pH, UA 7 0     Leukocytes, UA Negative     Nitrite, UA Negative     Protein, UA Negative mg/dl      Glucose, UA Negative mg/dl      Ketones, UA Negative mg/dl      Urobilinogen, UA 0 2 E U /dl      Bilirubin, UA Negative     Occult Blood, UA Moderate    Urine Microscopic [[id number]] Collected: 02/12/23 1927    Lab Status:  In process Specimen: Urine, Clean Catch Updated: 02/12/23 1935    POCT pregnancy, urine [[id number]]  (Normal) Resulted: 02/12/23 1927    Lab Status: Final result Updated: 02/12/23 1927     EXT Preg Test, Ur Negative     Control Valid    Comprehensive metabolic panel [[id number]]  (Abnormal) Collected: 02/12/23 1804    Lab Status: Final result Specimen: Blood from Arm, Right Updated: 02/12/23 1828     Sodium 139 mmol/L      Potassium 3 3 mmol/L      Chloride 101 mmol/L      CO2 28 mmol/L      ANION GAP 10 mmol/L      BUN 2 mg/dL      Creatinine 0 80 mg/dL      Glucose 95 mg/dL      Calcium 9 0 mg/dL      AST 35 U/L      ALT 41 U/L      Alkaline Phosphatase 101 U/L      Total Protein 7 4 g/dL      Albumin 4 1 g/dL      Total Bilirubin 0 35 mg/dL      eGFR 98 ml/min/1 73sq m     Narrative:      [place] guidelines for Chronic Kidney Disease (CKD):   •  Stage 1 with normal or high GFR (GFR > 90 mL/min/1 73 square meters)  •  Stage 2 Mild CKD (GFR = 60-89 mL/min/1 73 square meters)  •  Stage 3A Moderate CKD (GFR = 45-59 mL/min/1 73 square meters)  •  Stage 3B Moderate CKD (GFR = 30-44 mL/min/1 73 square meters)  •  Stage 4 Severe CKD (GFR = 15-29 mL/min/1 73 square meters)  •  Stage 5 End Stage CKD (GFR <15 mL/min/1 73 square meters)  Note: GFR calculation is accurate only with a steady state creatinine    Lipase [[id number]]  (Normal) Collected: 02/12/23 1804    Lab Status: Final result Specimen: Blood from Arm, Right Updated: 02/12/23 1828     Lipase 80 u/L     CBC and differential [[id number]]  (Abnormal) Collected: 02/12/23 1804    Lab Status: Final result Specimen: Blood from Arm, Right Updated: 02/12/23 1812     WBC 10 46 Thousand/uL      RBC 4 87 Million/uL      Hemoglobin 15 2 g/dL      Hematocrit 44 5 %      MCV 91 fL      MCH 31 2 pg      MCHC 34 2 g/dL      RDW 13 0 %      MPV 9 4 fL      Platelets 100 Thousands/uL      nRBC 0 /100 WBCs      Neutrophils Relative 80 %      Immat GRANS % 0 %      Lymphocytes Relative 13 %      Monocytes Relative 6 %      Eosinophils Relative 1 %      Basophils Relative 0 %      Neutrophils Absolute 8 32 Thousands/µL      Immature Grans Absolute 0 03 Thousand/uL      Lymphocytes Absolute 1 36 Thousands/µL      Monocytes Absolute 0 64 Thousand/µL      Eosinophils Absolute 0 08 Thousand/µL      Basophils Absolute 0 03 Thousands/µL                  No orders to display              Procedures  Procedures         ED Course                               SBIRT 20yo+    Flowsheet Row Most Recent Value   SBIRT (23 yo +)    In order to provide better care to our patients, we are screening all of our patients for alcohol and drug use  Would it be okay to ask you these screening questions? Yes Filed at: 02/12/2023 1810   Initial Alcohol Screen: US AUDIT-C     1  How often do you have a drink containing alcohol? 0 Filed at: 02/12/2023 1810   2  How many drinks containing alcohol do you have on a typical day you are drinking? 0 Filed at: 02/12/2023 1810   3b  FEMALE Any Age, or MALE 65+: How often do you have 4 or more drinks on one occassion? 0 Filed at: 02/12/2023 1810   Audit-C Score 0 Filed at: 02/12/2023 1810   NIKOLAI: How many times in the past year have you    Used an illegal drug or used a prescription medication for non-medical reasons?  Never Filed at: 02/12/2023 1810                    Medical Decision Making  Patient has had 1 day of antibiotic associated diarrhea  She has not been on a probiotic  Condition was worsened after Imodium  We will hydrate the patient, providing antiemetics I have suggested she stop the antibiotic and avoid use of Imodium    Amount and/or Complexity of Data Reviewed  Labs: ordered  Risk  Prescription drug management  Disposition  Final diagnoses:   Vomiting   Diarrhea     Time reflects when diagnosis was documented in both MDM as applicable and the Disposition within this note     Time User Action Codes Description Comment    2/12/2023  8:07 PM Johnella Hoose Add [R11 10] Vomiting     2/12/2023  8:07 PM Johnella Hoose Add [R19 7] Diarrhea       ED Disposition     ED Disposition   Discharge    Condition   Stable    Date/Time   Sun Feb 12, 2023  8:07 PM    [address] [facility] discharge to home/self care  Follow-up Information     Follow up With Specialties Details Why Contact Info    Lai Riley MD  Schedule an appointment as soon as possible for a visit   Maricel GATES [address] [facility]  [phone]            Patient's Medications   Discharge Prescriptions    No medications on file       No discharge procedures on file      PDMP Review     None          ED Provider  Electronically Signed by           Antoine Oacmpo MD  02/12/23 2010

## 2023-10-01 ENCOUNTER — OFFICE VISIT (OUTPATIENT)
Dept: URGENT CARE | Facility: CLINIC | Age: 33
End: 2023-10-01
Payer: COMMERCIAL

## 2023-10-01 VITALS
DIASTOLIC BLOOD PRESSURE: 72 MMHG | HEART RATE: 97 BPM | WEIGHT: 199 LBS | HEIGHT: 62 IN | TEMPERATURE: 97.1 F | SYSTOLIC BLOOD PRESSURE: 112 MMHG | OXYGEN SATURATION: 99 % | RESPIRATION RATE: 18 BRPM | BODY MASS INDEX: 36.62 KG/M2

## 2023-10-01 DIAGNOSIS — R05.1 ACUTE COUGH: Primary | ICD-10-CM

## 2023-10-01 PROCEDURE — 99213 OFFICE O/P EST LOW 20 MIN: CPT | Performed by: PREVENTIVE MEDICINE

## 2023-10-01 RX ORDER — GUAIFENESIN AND CODEINE PHOSPHATE 100; 10 MG/5ML; MG/5ML
5 SOLUTION ORAL 4 TIMES DAILY PRN
Qty: 118 ML | Refills: 0 | Status: SHIPPED | OUTPATIENT
Start: 2023-10-01

## 2023-10-01 RX ORDER — TRIAMCINOLONE ACETONIDE 0.25 MG/G
CREAM TOPICAL 2 TIMES DAILY
Qty: 30 G | Refills: 0 | Status: SHIPPED | OUTPATIENT
Start: 2023-10-01

## 2023-10-01 NOTE — PATIENT INSTRUCTIONS
Use the cream very thinly in the forearms. Codeine particularly at night. Try decongestant.   Recheck if no improvement

## 2023-10-01 NOTE — PROGRESS NOTES
[facility]        NAME: Lg Petersen is a 29 y.o. female  : 1990    MRN: [id number]  DATE: 2023  TIME: 11:36 AM    Assessment and Plan   Acute cough [R05.1]  1. Acute cough              Patient Instructions       Follow up with PCP in 3-5 days. Proceed to  ER if symptoms worsen. Chief Complaint     Chief Complaint   Patient presents with   • Cough     Worsening cough x 2 weeks with wheezing. Pt also reports of rash. History of Present Illness       Coughing x2 weeks. Denies fever denies shortness of breath. Also fine papular rash on both forearms. Review of Systems   Review of Systems   Constitutional: Negative for fever. Respiratory: Positive for cough. Negative for shortness of breath, wheezing and stridor. Skin: Positive for rash.          Current Medications       Current Outpatient Medications:   •  desvenlafaxine (PRISTIQ) 100 mg 24 hr tablet, Take 100 mg by mouth 2 (two) times a day, Disp: , Rfl:   •  dicyclomine (BENTYL) 20 mg tablet, Take 1 tablet (20 mg total) by mouth 2 (two) times a day, Disp: 20 tablet, Rfl: 0  •  famotidine (PEPCID) 20 mg tablet, Take 1 tablet (20 mg total) by mouth 2 (two) times a day for 7 days, Disp: 14 tablet, Rfl: 0  •  hydrOXYzine HCL (ATARAX) 25 mg tablet, Take 25 mg by mouth 3 (three) times a day, Disp: , Rfl:   •  Melatonin 10 MG TABS, Take 10 mg by mouth daily at bedtime, Disp: , Rfl:   •  nicotine (NICODERM CQ) 21 mg/24 hr TD 24 hr patch, Place 1 patch on the skin daily (Patient not taking: Reported on 2023), Disp: 28 patch, Rfl: 0  •  ondansetron (ZOFRAN-ODT) 4 mg disintegrating tablet, Take 1 tablet (4 mg total) by mouth every 8 (eight) hours as needed for nausea or vomiting, Disp: 20 tablet, Rfl: 0  •  rOPINIRole (REQUIP) 0.5 mg tablet, Take 0.5 mg by mouth 2 (two) times a day, Disp: , Rfl:   •  ziprasidone (GEODON) 60 mg capsule, Take 60 mg by mouth in the morning, Disp: , Rfl:     Current Allergies Allergies as of 10/01/2023 - Reviewed 10/01/2023   Allergen Reaction Noted   • Shellfish-derived products - food allergy Anaphylaxis 05/22/2021   • Lurasidone Other (See Comments) 03/30/2022   • Lamictal [lamotrigine] Rash 02/04/2023   • Other Itching 12/18/2020            The following portions of the patient's history were reviewed and updated as appropriate: allergies, current medications, past family history, past medical history, past social history, past surgical history and problem list.     Past Medical History:   Diagnosis Date   • Anxiety disorder    • Bipolar 1 disorder ([address])    • Diverticulitis    • Hypertension    • PTSD (post-traumatic stress disorder)        Past Surgical History:   Procedure Laterality Date   • CHOLECYSTECTOMY         History reviewed. No pertinent family history. Medications have been verified. Objective   /72   Pulse 97   Temp (!) 97.1 °F (36.2 °C)   Resp 18   Ht 5' 2" (1.575 m)   Wt 90.3 kg (199 lb)   SpO2 99%   BMI 36.40 kg/m²   No LMP recorded. Physical Exam     Physical Exam  Cardiovascular:      Heart sounds: Normal heart sounds. Pulmonary:      Breath sounds: Normal breath sounds. No wheezing, rhonchi or rales.    Skin:     Comments: Fine papular rash both forearms

## 2023-12-04 ENCOUNTER — APPOINTMENT (EMERGENCY)
Dept: RADIOLOGY | Facility: HOSPITAL | Age: 33
End: 2023-12-04
Payer: COMMERCIAL

## 2023-12-04 ENCOUNTER — HOSPITAL ENCOUNTER (EMERGENCY)
Facility: HOSPITAL | Age: 33
Discharge: HOME | End: 2023-12-04
Attending: EMERGENCY MEDICINE
Payer: COMMERCIAL

## 2023-12-04 VITALS
DIASTOLIC BLOOD PRESSURE: 95 MMHG | RESPIRATION RATE: 18 BRPM | HEART RATE: 70 BPM | TEMPERATURE: 97.8 F | SYSTOLIC BLOOD PRESSURE: 162 MMHG | BODY MASS INDEX: 36.58 KG/M2 | OXYGEN SATURATION: 100 % | WEIGHT: 200 LBS

## 2023-12-04 DIAGNOSIS — R51.9 HEADACHE: Primary | ICD-10-CM

## 2023-12-04 PROCEDURE — 70450 CT HEAD/BRAIN W/O DYE: CPT

## 2023-12-04 PROCEDURE — 99283 EMERGENCY DEPT VISIT LOW MDM: CPT

## 2023-12-04 PROCEDURE — 96374 THER/PROPH/DIAG INJ IV PUSH: CPT

## 2023-12-04 PROCEDURE — 96375 TX/PRO/DX INJ NEW DRUG ADDON: CPT

## 2023-12-04 PROCEDURE — 96361 HYDRATE IV INFUSION ADD-ON: CPT

## 2023-12-04 PROCEDURE — 99284 EMERGENCY DEPT VISIT MOD MDM: CPT | Performed by: EMERGENCY MEDICINE

## 2023-12-04 RX ORDER — KETOROLAC TROMETHAMINE 30 MG/ML
15 INJECTION, SOLUTION INTRAMUSCULAR; INTRAVENOUS ONCE
Status: COMPLETED | OUTPATIENT
Start: 2023-12-04 | End: 2023-12-04

## 2023-12-04 RX ORDER — METOCLOPRAMIDE HYDROCHLORIDE 5 MG/ML
10 INJECTION INTRAMUSCULAR; INTRAVENOUS ONCE
Status: COMPLETED | OUTPATIENT
Start: 2023-12-04 | End: 2023-12-04

## 2023-12-04 RX ORDER — DROPERIDOL 2.5 MG/ML
0.62 INJECTION, SOLUTION INTRAMUSCULAR; INTRAVENOUS ONCE
Status: COMPLETED | OUTPATIENT
Start: 2023-12-04 | End: 2023-12-04

## 2023-12-04 RX ADMIN — KETOROLAC TROMETHAMINE 15 MG: 30 INJECTION, SOLUTION INTRAMUSCULAR at 22:21

## 2023-12-04 RX ADMIN — DROPERIDOL 0.62 MG: 2.5 INJECTION, SOLUTION INTRAMUSCULAR; INTRAVENOUS at 23:28

## 2023-12-04 RX ADMIN — SODIUM CHLORIDE 1000 ML: 0.9 INJECTION, SOLUTION INTRAVENOUS at 22:32

## 2023-12-04 RX ADMIN — METOCLOPRAMIDE 10 MG: 5 INJECTION, SOLUTION INTRAMUSCULAR; INTRAVENOUS at 22:33

## 2023-12-05 NOTE — DISCHARGE INSTRUCTIONS
Continue 600 mg of ibuprofen and 975 mg of tylenol every 6 hours for your headache. Please call neurology to make an appointment for follow up. Return to the ER for any weakness to one side of your body, slurred speech, facial droop.

## 2023-12-05 NOTE — ED PROVIDER NOTES
History  Chief Complaint   Patient presents with    Headache     Pt reported that she may have side effect from pristiq for awhile, 100 mg BID. Pt reported that her therapist advised to come  to ED for eval, since she feel that she has been getting electrical zapped to her head and co of headache. Pt is AXOX3. NIH 0       80-year-old female past medical stay significant for psychiatric disease on Pristiq that was just increased presenting to the ED today for headache. Patient has been seen by her therapist as well as her psychiatrist and they are concerned that maybe that this is a side effect of the medication. Regardless patient says that over the last couple days she has been having a headache. Gradual in onset. Feels like a shocklike sensation. She has been trying Advil and Tylenol every 8 hours without relief of her symptoms. No chest pain or shortness of breath. No nausea or vomiting. Photophobia present. Prior to Admission Medications   Prescriptions Last Dose Informant Patient Reported? Taking?    Melatonin 10 MG TABS  Self Yes No   Sig: Take 10 mg by mouth daily at bedtime   desvenlafaxine (PRISTIQ) 100 mg 24 hr tablet  Self Yes No   Sig: Take 100 mg by mouth 2 (two) times a day   dicyclomine (BENTYL) 20 mg tablet   No No   Sig: Take 1 tablet (20 mg total) by mouth 2 (two) times a day   famotidine (PEPCID) 20 mg tablet   No No   Sig: Take 1 tablet (20 mg total) by mouth 2 (two) times a day for 7 days   guaifenesin-codeine (GUAIFENESIN AC) 100-10 MG/5ML liquid   No No   Sig: Take 5 mL by mouth 4 (four) times a day as needed for cough   hydrOXYzine HCL (ATARAX) 25 mg tablet   Yes No   Sig: Take 25 mg by mouth 3 (three) times a day   nicotine (NICODERM CQ) 21 mg/24 hr TD 24 hr patch   No No   Sig: Place 1 patch on the skin daily   Patient not taking: Reported on 2/4/2023   ondansetron (ZOFRAN-ODT) 4 mg disintegrating tablet   No No   Sig: Take 1 tablet (4 mg total) by mouth every 8 (eight) hours as needed for nausea or vomiting   rOPINIRole (REQUIP) 0.5 mg tablet   Yes No   Sig: Take 0.5 mg by mouth 2 (two) times a day   triamcinolone (KENALOG) 0.025 % cream   No No   Sig: Apply topically 2 (two) times a day   ziprasidone (GEODON) 60 mg capsule   Yes No   Sig: Take 60 mg by mouth in the morning      Facility-Administered Medications: None       Past Medical History:   Diagnosis Date    Anxiety disorder     Bipolar 1 disorder (HCC)     Diverticulitis     Hypertension     PTSD (post-traumatic stress disorder)        Past Surgical History:   Procedure Laterality Date    CHOLECYSTECTOMY         History reviewed. No pertinent family history. I have reviewed and agree with the history as documented. E-Cigarette/Vaping    E-Cigarette Use Never User      E-Cigarette/Vaping Substances    Nicotine No     THC No     CBD No     Flavoring No     Other No     Unknown No      Social History     Tobacco Use    Smoking status: Every Day     Packs/day: 2.00     Types: Cigarettes    Smokeless tobacco: Never   Vaping Use    Vaping Use: Never used   Substance Use Topics    Alcohol use: Never    Drug use: No       Review of Systems   Constitutional:  Negative for chills and fever. HENT:  Negative for hearing loss. Eyes:  Negative for visual disturbance. Respiratory:  Negative for shortness of breath. Cardiovascular:  Negative for chest pain. Gastrointestinal:  Negative for abdominal pain, constipation, diarrhea, nausea and vomiting. Genitourinary:  Negative for difficulty urinating. Musculoskeletal:  Negative for myalgias. Skin:  Negative for color change. Neurological:  Positive for headaches. Negative for dizziness. Psychiatric/Behavioral:  Negative for agitation. All other systems reviewed and are negative. Physical Exam  Physical Exam  Vitals and nursing note reviewed. Constitutional:       General: She is not in acute distress. Appearance: Normal appearance. She is well-developed.  She is not ill-appearing. HENT:      Head: Normocephalic and atraumatic. Right Ear: External ear normal.      Left Ear: External ear normal.      Nose: Nose normal. No congestion. Mouth/Throat:      Mouth: Mucous membranes are moist.      Pharynx: Oropharynx is clear. No oropharyngeal exudate. Eyes:      General:         Right eye: No discharge. Left eye: No discharge. Extraocular Movements: Extraocular movements intact. Conjunctiva/sclera: Conjunctivae normal.      Pupils: Pupils are equal, round, and reactive to light. Cardiovascular:      Rate and Rhythm: Normal rate and regular rhythm. Heart sounds: Normal heart sounds. No murmur heard. No friction rub. No gallop. Pulmonary:      Effort: Pulmonary effort is normal. No respiratory distress. Breath sounds: Normal breath sounds. No stridor. No wheezing. Abdominal:      General: Bowel sounds are normal. There is no distension. Palpations: Abdomen is soft. Tenderness: There is no abdominal tenderness. Musculoskeletal:         General: No swelling. Normal range of motion. Cervical back: Normal range of motion and neck supple. No rigidity. Skin:     General: Skin is warm and dry. Capillary Refill: Capillary refill takes less than 2 seconds. Neurological:      General: No focal deficit present. Mental Status: She is alert and oriented to person, place, and time. Mental status is at baseline. Cranial Nerves: No cranial nerve deficit. Sensory: No sensory deficit. Motor: No weakness.       Coordination: Coordination normal.      Gait: Gait normal.   Psychiatric:         Mood and Affect: Mood normal.         Behavior: Behavior normal.         Vital Signs  ED Triage Vitals [12/04/23 2155]   Temperature Pulse Respirations Blood Pressure SpO2   97.8 °F (36.6 °C) 70 18 162/95 100 %      Temp Source Heart Rate Source Patient Position - Orthostatic VS BP Location FiO2 (%)   Oral Monitor Sitting Right arm --      Pain Score       10 - Worst Possible Pain           Vitals:    12/04/23 2155   BP: 162/95   Pulse: 70   Patient Position - Orthostatic VS: Sitting         Visual Acuity  Visual Acuity      Flowsheet Row Most Recent Value   L Pupil Size (mm) 4   R Pupil Size (mm) 4            ED Medications  Medications   sodium chloride 0.9 % bolus 1,000 mL (0 mL Intravenous Stopped 12/4/23 2353)   metoclopramide (REGLAN) injection 10 mg (10 mg Intravenous Given 12/4/23 2233)   ketorolac (TORADOL) injection 15 mg (15 mg Intravenous Given 12/4/23 2221)   droperidol (INAPSINE) injection 0.625 mg (0.625 mg Intravenous Given 12/4/23 2328)       Diagnostic Studies  Results Reviewed       None                   CT head without contrast   Final Result by Myra Paiz MD (12/04 2325)      No acute intracranial abnormality. Workstation performed: [id number]                    Procedures  Procedures         ED Course                               SBIRT 22yo+      Flowsheet Row Most Recent Value   Initial Alcohol Screen: US AUDIT-C     1. How often do you have a drink containing alcohol? 0 Filed at: 12/04/2023 2158   2. How many drinks containing alcohol do you have on a typical day you are drinking? 0 Filed at: 12/04/2023 2158   3a. Male UNDER 65: How often do you have five or more drinks on one occasion? 0 Filed at: 12/04/2023 2158   3b. FEMALE Any Age, or MALE 65+: How often do you have 4 or more drinks on one occassion? 0 Filed at: 12/04/2023 2158   Audit-C Score 0 Filed at: 12/04/2023 2158                      Medical Decision Making  50-year-old female presenting to the ED today for headache. At this time differential diagnosis possible side effect of medication versus migraine versus possible structural abnormality in the brain. Will do a CT head without contrast to evaluate. Will also treat her symptomatically here with IV Reglan, IV fluids, IV Toradol.   Will give her a dose of IV droperidol. Patient was reassessed at bedside and patient was seen comfortably in bed. I discussed with her that we would not be able to get her headache to go down to 0. Encouraged her to follow-up with neurology as well as with her psychiatrist.  Strict return to ER precautions discussed and patient was discharged home. Amount and/or Complexity of Data Reviewed  Radiology: ordered. Risk  Prescription drug management. Disposition  Final diagnoses:   Headache     Time reflects when diagnosis was documented in both MDM as applicable and the Disposition within this note       Time User Action Codes Description Comment    12/4/2023 11:42 PM Prabhu Crawford [R51.9] Headache           ED Disposition       ED Disposition   Discharge    Condition   Stable    Date/Time   Mon Dec 4, 2023 2342    [place] discharge to home/self care.                    Follow-up Information       Follow up With Specialties Details Why Contact Info Additional [address] [facility] Schedule an appointment as soon as possible for a visit in 2 days for follow up [address] [address]  [facility] [address], [address] [facility], [address] [facility]    [address] [facility], [facility] Emergency Department Emergency Medicine Go to  If symptoms worsen, As needed [address] [address]  [address] [facility] Emergency Department, [address] [facility] [address] [place] [address], [id number]            Discharge Medication List as of 12/4/2023 11:45 PM        CONTINUE these medications which have NOT CHANGED    Details   desvenlafaxine (PRISTIQ) 100 mg 24 hr tablet Take 100 mg by mouth 2 (two) times a day, Historical Med      dicyclomine (BENTYL) 20 mg tablet Take 1 tablet (20 mg total) by mouth 2 (two) times a day, Starting Sun 2/5/2023, Normal      famotidine (PEPCID) 20 mg tablet Take 1 tablet (20 mg total) by mouth 2 (two) times a day for 7 days, Starting Sat 5/22/2021, Until Sat 5/29/2021, Normal      guaifenesin-codeine (GUAIFENESIN AC) 100-10 MG/5ML liquid Take 5 mL by mouth 4 (four) times a day as needed for cough, Starting Sun 10/1/2023, Normal      hydrOXYzine HCL (ATARAX) 25 mg tablet Take 25 mg by mouth 3 (three) times a day, Historical Med      Melatonin 10 MG TABS Take 10 mg by mouth daily at bedtime, Historical Med      nicotine (NICODERM CQ) 21 mg/24 hr TD 24 hr patch Place 1 patch on the skin daily, Starting Fri 3/18/2022, Normal      ondansetron (ZOFRAN-ODT) 4 mg disintegrating tablet Take 1 tablet (4 mg total) by mouth every 8 (eight) hours as needed for nausea or vomiting, Starting Sun 2/5/2023, Normal      rOPINIRole (REQUIP) 0.5 mg tablet Take 0.5 mg by mouth 2 (two) times a day, Historical Med      triamcinolone (KENALOG) 0.025 % cream Apply topically 2 (two) times a day, Starting Sun 10/1/2023, Normal      ziprasidone (GEODON) 60 mg capsule Take 60 mg by mouth in the morning, Historical Med             No discharge procedures on file.     PDMP Review       None            ED Provider  Electronically Signed by             Misael Biswas MD  12/04/23 7481

## 2023-12-26 ENCOUNTER — HOSPITAL ENCOUNTER (EMERGENCY)
Facility: HOSPITAL | Age: 33
Discharge: HOME | End: 2023-12-26
Attending: EMERGENCY MEDICINE
Payer: COMMERCIAL

## 2023-12-26 ENCOUNTER — OFFICE VISIT (OUTPATIENT)
Dept: URGENT CARE | Facility: CLINIC | Age: 33
End: 2023-12-26
Payer: COMMERCIAL

## 2023-12-26 VITALS
DIASTOLIC BLOOD PRESSURE: 86 MMHG | BODY MASS INDEX: 33.65 KG/M2 | HEART RATE: 88 BPM | WEIGHT: 184 LBS | OXYGEN SATURATION: 100 % | TEMPERATURE: 97.6 F | RESPIRATION RATE: 18 BRPM | SYSTOLIC BLOOD PRESSURE: 138 MMHG

## 2023-12-26 VITALS
TEMPERATURE: 97.5 F | BODY MASS INDEX: 33.47 KG/M2 | WEIGHT: 183 LBS | HEART RATE: 84 BPM | RESPIRATION RATE: 18 BRPM | DIASTOLIC BLOOD PRESSURE: 79 MMHG | OXYGEN SATURATION: 99 % | SYSTOLIC BLOOD PRESSURE: 131 MMHG

## 2023-12-26 DIAGNOSIS — A08.4 VIRAL GASTROENTERITIS: Primary | ICD-10-CM

## 2023-12-26 DIAGNOSIS — R11.2 NAUSEA VOMITING AND DIARRHEA: Primary | ICD-10-CM

## 2023-12-26 DIAGNOSIS — R19.7 NAUSEA VOMITING AND DIARRHEA: Primary | ICD-10-CM

## 2023-12-26 LAB
ALBUMIN SERPL BCP-MCNC: 4 G/DL (ref 3.5–5)
ALP SERPL-CCNC: 71 U/L (ref 34–104)
ALT SERPL W P-5'-P-CCNC: 39 U/L (ref 7–52)
ANION GAP SERPL CALCULATED.3IONS-SCNC: 7 MMOL/L
AST SERPL W P-5'-P-CCNC: 38 U/L (ref 13–39)
BACTERIA UR QL AUTO: ABNORMAL /HPF
BASOPHILS # BLD AUTO: 0.02 THOUSANDS/ÂΜL (ref 0–0.1)
BASOPHILS NFR BLD AUTO: 0 % (ref 0–1)
BILIRUB SERPL-MCNC: 0.39 MG/DL (ref 0.2–1)
BILIRUB UR QL STRIP: ABNORMAL
BUN SERPL-MCNC: 5 MG/DL (ref 5–25)
CALCIUM SERPL-MCNC: 8.8 MG/DL (ref 8.4–10.2)
CHLORIDE SERPL-SCNC: 107 MMOL/L (ref 96–108)
CLARITY UR: ABNORMAL
CO2 SERPL-SCNC: 27 MMOL/L (ref 21–32)
COLOR UR: YELLOW
CREAT SERPL-MCNC: 0.78 MG/DL (ref 0.6–1.3)
EOSINOPHIL # BLD AUTO: 0.13 THOUSAND/ÂΜL (ref 0–0.61)
EOSINOPHIL NFR BLD AUTO: 2 % (ref 0–6)
ERYTHROCYTE [DISTWIDTH] IN BLOOD BY AUTOMATED COUNT: 13.7 % (ref 11.6–15.1)
EXT PREGNANCY TEST URINE: NEGATIVE
EXT. CONTROL: NORMAL
GFR SERPL CREATININE-BSD FRML MDRD: 100 ML/MIN/1.73SQ M
GLUCOSE SERPL-MCNC: 83 MG/DL (ref 65–140)
GLUCOSE UR STRIP-MCNC: NEGATIVE MG/DL
HCT VFR BLD AUTO: 40.6 % (ref 34.8–46.1)
HGB BLD-MCNC: 14.1 G/DL (ref 11.5–15.4)
HGB UR QL STRIP.AUTO: ABNORMAL
IMM GRANULOCYTES # BLD AUTO: 0.02 THOUSAND/UL (ref 0–0.2)
IMM GRANULOCYTES NFR BLD AUTO: 0 % (ref 0–2)
KETONES UR STRIP-MCNC: ABNORMAL MG/DL
LEUKOCYTE ESTERASE UR QL STRIP: NEGATIVE
LIPASE SERPL-CCNC: 6 U/L (ref 11–82)
LYMPHOCYTES # BLD AUTO: 1.93 THOUSANDS/ÂΜL (ref 0.6–4.47)
LYMPHOCYTES NFR BLD AUTO: 29 % (ref 14–44)
MCH RBC QN AUTO: 32.5 PG (ref 26.8–34.3)
MCHC RBC AUTO-ENTMCNC: 34.7 G/DL (ref 31.4–37.4)
MCV RBC AUTO: 94 FL (ref 82–98)
MONOCYTES # BLD AUTO: 0.43 THOUSAND/ÂΜL (ref 0.17–1.22)
MONOCYTES NFR BLD AUTO: 7 % (ref 4–12)
MUCOUS THREADS UR QL AUTO: ABNORMAL
NEUTROPHILS # BLD AUTO: 4.11 THOUSANDS/ÂΜL (ref 1.85–7.62)
NEUTS SEG NFR BLD AUTO: 62 % (ref 43–75)
NITRITE UR QL STRIP: NEGATIVE
NON-SQ EPI CELLS URNS QL MICRO: ABNORMAL /HPF
NRBC BLD AUTO-RTO: 0 /100 WBCS
PH UR STRIP.AUTO: 6 [PH]
PLATELET # BLD AUTO: 287 THOUSANDS/UL (ref 149–390)
PMV BLD AUTO: 9.7 FL (ref 8.9–12.7)
POTASSIUM SERPL-SCNC: 3.2 MMOL/L (ref 3.5–5.3)
PROT SERPL-MCNC: 6.2 G/DL (ref 6.4–8.4)
PROT UR STRIP-MCNC: NEGATIVE MG/DL
RBC # BLD AUTO: 4.34 MILLION/UL (ref 3.81–5.12)
RBC #/AREA URNS AUTO: ABNORMAL /HPF
SODIUM SERPL-SCNC: 141 MMOL/L (ref 135–147)
SP GR UR STRIP.AUTO: >=1.03 (ref 1–1.03)
UROBILINOGEN UR QL STRIP.AUTO: 1 E.U./DL
WBC # BLD AUTO: 6.64 THOUSAND/UL (ref 4.31–10.16)
WBC #/AREA URNS AUTO: ABNORMAL /HPF

## 2023-12-26 PROCEDURE — 83690 ASSAY OF LIPASE: CPT | Performed by: EMERGENCY MEDICINE

## 2023-12-26 PROCEDURE — 96361 HYDRATE IV INFUSION ADD-ON: CPT

## 2023-12-26 PROCEDURE — 85025 COMPLETE CBC W/AUTO DIFF WBC: CPT | Performed by: EMERGENCY MEDICINE

## 2023-12-26 PROCEDURE — 81025 URINE PREGNANCY TEST: CPT | Performed by: EMERGENCY MEDICINE

## 2023-12-26 PROCEDURE — 81001 URINALYSIS AUTO W/SCOPE: CPT | Performed by: EMERGENCY MEDICINE

## 2023-12-26 PROCEDURE — 80053 COMPREHEN METABOLIC PANEL: CPT | Performed by: EMERGENCY MEDICINE

## 2023-12-26 PROCEDURE — 99284 EMERGENCY DEPT VISIT MOD MDM: CPT | Performed by: EMERGENCY MEDICINE

## 2023-12-26 PROCEDURE — 36415 COLL VENOUS BLD VENIPUNCTURE: CPT | Performed by: EMERGENCY MEDICINE

## 2023-12-26 PROCEDURE — 96374 THER/PROPH/DIAG INJ IV PUSH: CPT

## 2023-12-26 PROCEDURE — 99213 OFFICE O/P EST LOW 20 MIN: CPT | Performed by: PHYSICIAN ASSISTANT

## 2023-12-26 PROCEDURE — 99284 EMERGENCY DEPT VISIT MOD MDM: CPT

## 2023-12-26 RX ORDER — ONDANSETRON 4 MG/1
8 TABLET, ORALLY DISINTEGRATING ORAL ONCE
Status: COMPLETED | OUTPATIENT
Start: 2023-12-26 | End: 2023-12-26

## 2023-12-26 RX ORDER — ONDANSETRON 2 MG/ML
4 INJECTION INTRAMUSCULAR; INTRAVENOUS ONCE
Status: COMPLETED | OUTPATIENT
Start: 2023-12-26 | End: 2023-12-26

## 2023-12-26 RX ORDER — ONDANSETRON 4 MG/1
4-8 TABLET, FILM COATED ORAL EVERY 8 HOURS PRN
Qty: 20 TABLET | Refills: 0 | Status: SHIPPED | OUTPATIENT
Start: 2023-12-26

## 2023-12-26 RX ORDER — POTASSIUM CHLORIDE 20 MEQ/1
20 TABLET, EXTENDED RELEASE ORAL ONCE
Status: COMPLETED | OUTPATIENT
Start: 2023-12-26 | End: 2023-12-26

## 2023-12-26 RX ADMIN — SODIUM CHLORIDE 1000 ML: 0.9 INJECTION, SOLUTION INTRAVENOUS at 17:07

## 2023-12-26 RX ADMIN — ONDANSETRON 4 MG: 2 INJECTION INTRAMUSCULAR; INTRAVENOUS at 17:09

## 2023-12-26 RX ADMIN — ONDANSETRON 8 MG: 4 TABLET, ORALLY DISINTEGRATING ORAL at 13:47

## 2023-12-26 RX ADMIN — POTASSIUM CHLORIDE 20 MEQ: 1500 TABLET, EXTENDED RELEASE ORAL at 18:18

## 2023-12-26 NOTE — PROGRESS NOTES
[facility]        NAME: Sandi Gaston is a 33 y.o. female  : 1990    MRN: [id number]  DATE: 2023  TIME: 1:42 PM    Assessment and Plan   Viral gastroenteritis [A08.4]  1. Viral gastroenteritis  ondansetron (ZOFRAN-ODT) dispersible tablet 8 mg    ondansetron (ZOFRAN) 4 mg tablet            Patient Instructions   1.  Increase oral fluids.  2.  Observe a liquid diet until your vomiting resolves.  3.  When you begin to advance your diet, begin with the brat diet as discussed.  4.  Go to the ER immediately for any significantly worsening symptoms or if your symptoms do not improve within the next 24 hours.      Chief Complaint     Chief Complaint   Patient presents with    Vomiting     Beginning Saturday - has been vomiting since, body aches, muscle pain; has only consumed fluids, cannot tolerate solids         History of Present Illness       33-year-old female patient with a 4-day history of persistent vomiting, nausea, diarrhea which began suddenly on 2023.  Patient thinks that the symptoms may be from bad chicken at a local [employer] restaurant but cannot be certain.  Patient also complains of intermittent crampy abdominal discomfort.  No fever or chills.  Patient does have body aches and fatigue.  Patient is only been able to hold down small amounts of oral fluids.    Vomiting   Associated symptoms include abdominal pain, chills, diarrhea and myalgias. Pertinent negatives include no arthralgias, chest pain, coughing or fever.       Review of Systems   Review of Systems   Constitutional:  Positive for chills and fatigue. Negative for fever.   HENT:  Negative for ear pain and sore throat.    Eyes:  Negative for pain and visual disturbance.   Respiratory:  Negative for cough and shortness of breath.    Cardiovascular:  Negative for chest pain and palpitations.   Gastrointestinal:  Positive for abdominal pain, diarrhea, nausea and vomiting. Negative for abdominal distention, anal  bleeding, blood in stool, constipation and rectal pain.   Genitourinary:  Negative for dysuria and hematuria.   Musculoskeletal:  Positive for myalgias. Negative for arthralgias and back pain.   Skin:  Negative for color change and rash.   Neurological:  Positive for light-headedness. Negative for seizures and syncope.   All other systems reviewed and are negative.        Current Medications       Current Outpatient Medications:     desvenlafaxine (PRISTIQ) 100 mg 24 hr tablet, Take 100 mg by mouth 2 (two) times a day, Disp: , Rfl:     ondansetron (ZOFRAN) 4 mg tablet, Take 1-2 tablets (4-8 mg total) by mouth every 8 (eight) hours as needed for nausea or vomiting, Disp: 20 tablet, Rfl: 0    ondansetron (ZOFRAN-ODT) 4 mg disintegrating tablet, Take 1 tablet (4 mg total) by mouth every 8 (eight) hours as needed for nausea or vomiting, Disp: 20 tablet, Rfl: 0    rOPINIRole (REQUIP) 0.5 mg tablet, Take 0.5 mg by mouth 2 (two) times a day, Disp: , Rfl:     triamcinolone (KENALOG) 0.025 % cream, Apply topically 2 (two) times a day, Disp: 30 g, Rfl: 0    dicyclomine (BENTYL) 20 mg tablet, Take 1 tablet (20 mg total) by mouth 2 (two) times a day (Patient not taking: Reported on 12/26/2023), Disp: 20 tablet, Rfl: 0    famotidine (PEPCID) 20 mg tablet, Take 1 tablet (20 mg total) by mouth 2 (two) times a day for 7 days, Disp: 14 tablet, Rfl: 0    guaifenesin-codeine (GUAIFENESIN AC) 100-10 MG/5ML liquid, Take 5 mL by mouth 4 (four) times a day as needed for cough (Patient not taking: Reported on 12/26/2023), Disp: 118 mL, Rfl: 0    hydrOXYzine HCL (ATARAX) 25 mg tablet, Take 25 mg by mouth 3 (three) times a day (Patient not taking: Reported on 12/26/2023), Disp: , Rfl:     Melatonin 10 MG TABS, Take 10 mg by mouth daily at bedtime (Patient not taking: Reported on 12/26/2023), Disp: , Rfl:     nicotine (NICODERM CQ) 21 mg/24 hr TD 24 hr patch, Place 1 patch on the skin daily (Patient not taking: Reported on 2/4/2023), Disp: 28  patch, Rfl: 0    ziprasidone (GEODON) 60 mg capsule, Take 60 mg by mouth in the morning (Patient not taking: Reported on 12/26/2023), Disp: , Rfl:     Current Facility-Administered Medications:     ondansetron (ZOFRAN-ODT) dispersible tablet 8 mg, 8 mg, Oral, Once, Ángel Fowler PA-C    Current Allergies     Allergies as of 12/26/2023 - Reviewed 12/26/2023   Allergen Reaction Noted    Shellfish-derived products - food allergy Anaphylaxis 05/22/2021    Lurasidone Other (See Comments) 03/30/2022    Lamictal [lamotrigine] Rash 02/04/2023    Other Itching 12/18/2020            The following portions of the patient's history were reviewed and updated as appropriate: allergies, current medications, past family history, past medical history, past social history, past surgical history and problem list.     Past Medical History:   Diagnosis Date    Anxiety disorder     Bipolar 1 disorder (HCC)     Diverticulitis     Hypertension     PTSD (post-traumatic stress disorder)        Past Surgical History:   Procedure Laterality Date    CHOLECYSTECTOMY         No family history on file.      Medications have been verified.        Objective   /86   Pulse 88   Temp 97.6 °F (36.4 °C)   Resp 18   Wt 83.5 kg (184 lb)   LMP 11/28/2023 (Approximate)   SpO2 100%   BMI 33.65 kg/m²        Physical Exam     Physical Exam  Constitutional:       General: She is not in acute distress.     Appearance: Normal appearance. She is ill-appearing.   HENT:      Head: Normocephalic.      Nose: Nose normal.      Mouth/Throat:      Mouth: Mucous membranes are dry.      Pharynx: No oropharyngeal exudate or posterior oropharyngeal erythema.   Eyes:      Conjunctiva/sclera: Conjunctivae normal.      Pupils: Pupils are equal, round, and reactive to light.   Cardiovascular:      Rate and Rhythm: Normal rate.      Pulses: Normal pulses.      Heart sounds: Normal heart sounds.   Pulmonary:      Effort: Pulmonary effort is normal.      Breath  sounds: Normal breath sounds.   Abdominal:      General: Abdomen is flat. There is no distension.      Palpations: Abdomen is soft. There is no mass.      Tenderness: There is no abdominal tenderness. There is no right CVA tenderness, left CVA tenderness, guarding or rebound.   Musculoskeletal:         General: Normal range of motion.      Cervical back: Normal range of motion.   Lymphadenopathy:      Cervical: No cervical adenopathy.   Skin:     Capillary Refill: Capillary refill takes less than 2 seconds.   Neurological:      Mental Status: She is alert and oriented to person, place, and time.   Psychiatric:         Mood and Affect: Mood normal.         Behavior: Behavior normal.

## 2023-12-26 NOTE — ED PROVIDER NOTES
History  Chief Complaint   Patient presents with    Vomiting    Diarrhea     Pt states vomiting and diarrhea since Saturday. Thinks it was food poisoning because other family members were also sick. Was just seen at urgent care and given otd zofran. States she is here for IV fluids.     Patient presents for evaluation of nausea vomiting diarrhea starting on Saturday.  Reports multiple other family members were also sick that she was with at that time.  Unsure if it was viral or food poisoning.  Patient was seen at urgent care earlier today and giving oral Zofran.  He states she still had vomiting afterwards and believes she needs IV fluids and feels dehydrated with some muscle cramping.  No blood in the vomit or diarrhea.      History provided by:  Patient   used: No    Vomiting  Associated symptoms: abdominal pain and diarrhea    Diarrhea  Associated symptoms: abdominal pain and vomiting        Prior to Admission Medications   Prescriptions Last Dose Informant Patient Reported? Taking?   Melatonin 10 MG TABS  Self Yes No   Sig: Take 10 mg by mouth daily at bedtime   Patient not taking: Reported on 12/26/2023   desvenlafaxine (PRISTIQ) 100 mg 24 hr tablet  Self Yes No   Sig: Take 100 mg by mouth 2 (two) times a day   dicyclomine (BENTYL) 20 mg tablet   No No   Sig: Take 1 tablet (20 mg total) by mouth 2 (two) times a day   Patient not taking: Reported on 12/26/2023   famotidine (PEPCID) 20 mg tablet   No No   Sig: Take 1 tablet (20 mg total) by mouth 2 (two) times a day for 7 days   guaifenesin-codeine (GUAIFENESIN AC) 100-10 MG/5ML liquid   No No   Sig: Take 5 mL by mouth 4 (four) times a day as needed for cough   Patient not taking: Reported on 12/26/2023   hydrOXYzine HCL (ATARAX) 25 mg tablet   Yes No   Sig: Take 25 mg by mouth 3 (three) times a day   Patient not taking: Reported on 12/26/2023   nicotine (NICODERM CQ) 21 mg/24 hr TD 24 hr patch   No No   Sig: Place 1 patch on the skin daily    Patient not taking: Reported on 2/4/2023   ondansetron (ZOFRAN) 4 mg tablet   No No   Sig: Take 1-2 tablets (4-8 mg total) by mouth every 8 (eight) hours as needed for nausea or vomiting   ondansetron (ZOFRAN-ODT) 4 mg disintegrating tablet   No No   Sig: Take 1 tablet (4 mg total) by mouth every 8 (eight) hours as needed for nausea or vomiting   rOPINIRole (REQUIP) 0.5 mg tablet   Yes No   Sig: Take 0.5 mg by mouth 2 (two) times a day   triamcinolone (KENALOG) 0.025 % cream   No No   Sig: Apply topically 2 (two) times a day   ziprasidone (GEODON) 60 mg capsule   Yes No   Sig: Take 60 mg by mouth in the morning   Patient not taking: Reported on 12/26/2023      Facility-Administered Medications Last Administration Doses Remaining   ondansetron (ZOFRAN-ODT) dispersible tablet 8 mg 12/26/2023  1:47 PM 0          Past Medical History:   Diagnosis Date    Anxiety disorder     Bipolar 1 disorder (HCC)     Diverticulitis     Hypertension     PTSD (post-traumatic stress disorder)        Past Surgical History:   Procedure Laterality Date    CHOLECYSTECTOMY         History reviewed. No pertinent family history.  I have reviewed and agree with the history as documented.    E-Cigarette/Vaping    E-Cigarette Use Never User      E-Cigarette/Vaping Substances    Nicotine No     THC No     CBD No     Flavoring No     Other No     Unknown No      Social History     Tobacco Use    Smoking status: Every Day     Current packs/day: 2.00     Types: Cigarettes    Smokeless tobacco: Never   Vaping Use    Vaping status: Never Used   Substance Use Topics    Alcohol use: Never    Drug use: No       Review of Systems   Gastrointestinal:  Positive for abdominal pain, diarrhea, nausea and vomiting.   All other systems reviewed and are negative.      Physical Exam  Physical Exam  Vitals and nursing note reviewed.   Constitutional:       General: She is not in acute distress.  HENT:      Mouth/Throat:      Mouth: Mucous membranes are dry.       Pharynx: Oropharynx is clear.   Cardiovascular:      Rate and Rhythm: Normal rate and regular rhythm.   Pulmonary:      Effort: Pulmonary effort is normal. No respiratory distress.      Breath sounds: Normal breath sounds.   Abdominal:      General: Bowel sounds are normal. There is no distension.      Palpations: Abdomen is soft.      Tenderness: There is no abdominal tenderness. There is no guarding.      Hernia: No hernia is present.   Musculoskeletal:         General: No deformity. Normal range of motion.   Skin:     Capillary Refill: Capillary refill takes less than 2 seconds.      Findings: No rash.   Neurological:      General: No focal deficit present.      Mental Status: She is alert and oriented to person, place, and time.         Vital Signs  ED Triage Vitals [12/26/23 1406]   Temperature Pulse Respirations Blood Pressure SpO2   97.5 °F (36.4 °C) 84 18 131/79 99 %      Temp Source Heart Rate Source Patient Position - Orthostatic VS BP Location FiO2 (%)   Tympanic Monitor Sitting Right arm --      Pain Score       --           Vitals:    12/26/23 1406   BP: 131/79   Pulse: 84   Patient Position - Orthostatic VS: Sitting         Visual Acuity      ED Medications  Medications   sodium chloride 0.9 % bolus 1,000 mL (0 mL Intravenous Stopped 12/26/23 1818)   ondansetron (ZOFRAN) injection 4 mg (4 mg Intravenous Given 12/26/23 1709)   potassium chloride (K-DUR,KLOR-CON) CR tablet 20 mEq (20 mEq Oral Given 12/26/23 1818)       Diagnostic Studies  Results Reviewed       Procedure Component Value Units Date/Time    Urine Microscopic [[id number]]  (Abnormal) Collected: 12/26/23 1820    Lab Status: Final result Specimen: Urine, Clean Catch Updated: 12/26/23 1917     RBC, UA 4-10 /hpf      WBC, UA 2-4 /hpf      Epithelial Cells Moderate /hpf      Bacteria, UA Innumerable /hpf      MUCUS THREADS Occasional    UA (URINE) with reflex to Scope [[id number]]  (Abnormal) Collected: 12/26/23 1820    Lab Status: Final result  Specimen: Urine, Clean Catch Updated: 12/26/23 1827     Color, UA Yellow     Clarity, UA Slightly Cloudy     Specific Gravity, UA >=1.030     pH, UA 6.0     Leukocytes, UA Negative     Nitrite, UA Negative     Protein, UA Negative mg/dl      Glucose, UA Negative mg/dl      Ketones, UA Trace mg/dl      Urobilinogen, UA 1.0 E.U./dl      Bilirubin, UA Small     Occult Blood, UA Moderate    POCT pregnancy, urine [[id number]]  (Normal) Resulted: 12/26/23 1818    Lab Status: Final result Updated: 12/26/23 1818     EXT Preg Test, Ur Negative     Control Valid    Comprehensive metabolic panel [[id number]]  (Abnormal) Collected: 12/26/23 1709    Lab Status: Final result Specimen: Blood from Arm, Right Updated: 12/26/23 1738     Sodium 141 mmol/L      Potassium 3.2 mmol/L      Chloride 107 mmol/L      CO2 27 mmol/L      ANION GAP 7 mmol/L      BUN 5 mg/dL      Creatinine 0.78 mg/dL      Glucose 83 mg/dL      Calcium 8.8 mg/dL      AST 38 U/L      ALT 39 U/L      Alkaline Phosphatase 71 U/L      Total Protein 6.2 g/dL      Albumin 4.0 g/dL      Total Bilirubin 0.39 mg/dL      eGFR 100 ml/min/1.73sq m     Narrative:      [employer] guidelines for Chronic Kidney Disease (CKD):     Stage 1 with normal or high GFR (GFR > 90 mL/min/1.73 square meters)    Stage 2 Mild CKD (GFR = 60-89 mL/min/1.73 square meters)    Stage 3A Moderate CKD (GFR = 45-59 mL/min/1.73 square meters)    Stage 3B Moderate CKD (GFR = 30-44 mL/min/1.73 square meters)    Stage 4 Severe CKD (GFR = 15-29 mL/min/1.73 square meters)    Stage 5 End Stage CKD (GFR <15 mL/min/1.73 square meters)  Note: GFR calculation is accurate only with a steady state creatinine    Lipase [[id number]]  (Abnormal) Collected: 12/26/23 1709    Lab Status: Final result Specimen: Blood from Arm, Right Updated: 12/26/23 1738     Lipase 6 u/L     CBC and differential [[id number]] Collected: 12/26/23 1709    Lab Status: Final result Specimen: Blood from Arm, Right  Updated: 12/26/23 1717     WBC 6.64 Thousand/uL      RBC 4.34 Million/uL      Hemoglobin 14.1 g/dL      Hematocrit 40.6 %      MCV 94 fL      MCH 32.5 pg      MCHC 34.7 g/dL      RDW 13.7 %      MPV 9.7 fL      Platelets 287 Thousands/uL      nRBC 0 /100 WBCs      Neutrophils Relative 62 %      Immat GRANS % 0 %      Lymphocytes Relative 29 %      Monocytes Relative 7 %      Eosinophils Relative 2 %      Basophils Relative 0 %      Neutrophils Absolute 4.11 Thousands/µL      Immature Grans Absolute 0.02 Thousand/uL      Lymphocytes Absolute 1.93 Thousands/µL      Monocytes Absolute 0.43 Thousand/µL      Eosinophils Absolute 0.13 Thousand/µL      Basophils Absolute 0.02 Thousands/µL                    No orders to display              Procedures  Procedures         ED Course                                             Medical Decision Making  Pulse ox 99% on room air indicating adequate oxygenation.    Amount and/or Complexity of Data Reviewed  Labs: ordered.    Risk  Prescription drug management.             Disposition  Final diagnoses:   Nausea vomiting and diarrhea     Time reflects when diagnosis was documented in both MDM as applicable and the Disposition within this note       Time User Action Codes Description Comment    12/26/2023  6:01 PM Martin Ivan Add [R11.2,  R19.7] Nausea vomiting and diarrhea           ED Disposition       ED Disposition   Discharge    Condition   Stable    Date/Time   Tue Dec 26, 2023  6:01 PM    Comment   Sandi Gaston discharge to home/self care.                   Follow-up Information       Follow up With Specialties Details Why Contact Info Additional Information    Nii Deluna MD   As needed [address]  [address]  [facility] [id number] [phone]       [facility] Emergency Department Emergency Medicine  If symptoms worsen [address] [id number] [phone] [facility], [address]  [place], [place], [address]            Discharge Medication List as of 12/26/2023  6:06 PM        CONTINUE these medications which have NOT CHANGED    Details   desvenlafaxine (PRISTIQ) 100 mg 24 hr tablet Take 100 mg by mouth 2 (two) times a day, Historical Med      dicyclomine (BENTYL) 20 mg tablet Take 1 tablet (20 mg total) by mouth 2 (two) times a day, Starting Sun 2/5/2023, Normal      famotidine (PEPCID) 20 mg tablet Take 1 tablet (20 mg total) by mouth 2 (two) times a day for 7 days, Starting Sat 5/22/2021, Until Sat 5/29/2021, Normal      guaifenesin-codeine (GUAIFENESIN AC) 100-10 MG/5ML liquid Take 5 mL by mouth 4 (four) times a day as needed for cough, Starting Sun 10/1/2023, Normal      hydrOXYzine HCL (ATARAX) 25 mg tablet Take 25 mg by mouth 3 (three) times a day, Historical Med      Melatonin 10 MG TABS Take 10 mg by mouth daily at bedtime, Historical Med      nicotine (NICODERM CQ) 21 mg/24 hr TD 24 hr patch Place 1 patch on the skin daily, Starting Fri 3/18/2022, Normal      ondansetron (ZOFRAN) 4 mg tablet Take 1-2 tablets (4-8 mg total) by mouth every 8 (eight) hours as needed for nausea or vomiting, Starting Tue 12/26/2023, Normal      ondansetron (ZOFRAN-ODT) 4 mg disintegrating tablet Take 1 tablet (4 mg total) by mouth every 8 (eight) hours as needed for nausea or vomiting, Starting Sun 2/5/2023, Normal      rOPINIRole (REQUIP) 0.5 mg tablet Take 0.5 mg by mouth 2 (two) times a day, Historical Med      triamcinolone (KENALOG) 0.025 % cream Apply topically 2 (two) times a day, Starting Sun 10/1/2023, Normal      ziprasidone (GEODON) 60 mg capsule Take 60 mg by mouth in the morning, Historical Med             No discharge procedures on file.    PDMP Review       None            ED Provider  Electronically Signed by             Martin Ivan DO  12/27/23 1600

## 2023-12-26 NOTE — PATIENT INSTRUCTIONS
1.  Increase oral fluids.  2.  Observe a liquid diet until your vomiting resolves.  3.  When you begin to advance your diet, begin with the brat diet as discussed.  4.  Go to the ER immediately for any significantly worsening symptoms or if your symptoms do not improve within the next 24 hours.

## 2024-03-31 ENCOUNTER — HOSPITAL ENCOUNTER (EMERGENCY)
Facility: HOSPITAL | Age: 34
Discharge: HOME | End: 2024-04-01
Attending: EMERGENCY MEDICINE
Payer: COMMERCIAL

## 2024-03-31 DIAGNOSIS — R52 GENERALIZED BODY ACHES: Primary | ICD-10-CM

## 2024-03-31 DIAGNOSIS — M79.10 MYALGIA: ICD-10-CM

## 2024-03-31 LAB
ALBUMIN SERPL BCP-MCNC: 4 G/DL (ref 3.5–5)
ALP SERPL-CCNC: 70 U/L (ref 34–104)
ALT SERPL W P-5'-P-CCNC: 11 U/L (ref 7–52)
ANION GAP SERPL CALCULATED.3IONS-SCNC: 8 MMOL/L (ref 4–13)
AST SERPL W P-5'-P-CCNC: 11 U/L (ref 13–39)
BASOPHILS # BLD AUTO: 0.04 THOUSANDS/ÂΜL (ref 0–0.1)
BASOPHILS NFR BLD AUTO: 0 % (ref 0–1)
BILIRUB SERPL-MCNC: 0.24 MG/DL (ref 0.2–1)
BUN SERPL-MCNC: 8 MG/DL (ref 5–25)
CALCIUM SERPL-MCNC: 8.5 MG/DL (ref 8.4–10.2)
CARDIAC TROPONIN I PNL SERPL HS: 2 NG/L
CHLORIDE SERPL-SCNC: 104 MMOL/L (ref 96–108)
CK SERPL-CCNC: 43 U/L (ref 26–192)
CO2 SERPL-SCNC: 24 MMOL/L (ref 21–32)
CREAT SERPL-MCNC: 0.75 MG/DL (ref 0.6–1.3)
CRP SERPL QL: 1.3 MG/L
EOSINOPHIL # BLD AUTO: 0.13 THOUSAND/ÂΜL (ref 0–0.61)
EOSINOPHIL NFR BLD AUTO: 1 % (ref 0–6)
ERYTHROCYTE [DISTWIDTH] IN BLOOD BY AUTOMATED COUNT: 12.5 % (ref 11.6–15.1)
ERYTHROCYTE [SEDIMENTATION RATE] IN BLOOD: 6 MM/HOUR (ref 0–19)
FLUAV RNA RESP QL NAA+PROBE: NEGATIVE
FLUBV RNA RESP QL NAA+PROBE: NEGATIVE
GFR SERPL CREATININE-BSD FRML MDRD: 105 ML/MIN/1.73SQ M
GLUCOSE SERPL-MCNC: 107 MG/DL (ref 65–140)
HCG SERPL QL: NEGATIVE
HCT VFR BLD AUTO: 42.1 % (ref 34.8–46.1)
HGB BLD-MCNC: 14.2 G/DL (ref 11.5–15.4)
IMM GRANULOCYTES # BLD AUTO: 0.05 THOUSAND/UL (ref 0–0.2)
IMM GRANULOCYTES NFR BLD AUTO: 0 % (ref 0–2)
LYMPHOCYTES # BLD AUTO: 2.19 THOUSANDS/ÂΜL (ref 0.6–4.47)
LYMPHOCYTES NFR BLD AUTO: 17 % (ref 14–44)
MCH RBC QN AUTO: 31.5 PG (ref 26.8–34.3)
MCHC RBC AUTO-ENTMCNC: 33.7 G/DL (ref 31.4–37.4)
MCV RBC AUTO: 93 FL (ref 82–98)
MONOCYTES # BLD AUTO: 0.65 THOUSAND/ÂΜL (ref 0.17–1.22)
MONOCYTES NFR BLD AUTO: 5 % (ref 4–12)
NEUTROPHILS # BLD AUTO: 10.07 THOUSANDS/ÂΜL (ref 1.85–7.62)
NEUTS SEG NFR BLD AUTO: 77 % (ref 43–75)
NRBC BLD AUTO-RTO: 0 /100 WBCS
PLATELET # BLD AUTO: 328 THOUSANDS/UL (ref 149–390)
PMV BLD AUTO: 9.5 FL (ref 8.9–12.7)
POTASSIUM SERPL-SCNC: 3.6 MMOL/L (ref 3.5–5.3)
PROT SERPL-MCNC: 6.3 G/DL (ref 6.4–8.4)
RBC # BLD AUTO: 4.51 MILLION/UL (ref 3.81–5.12)
RSV RNA RESP QL NAA+PROBE: NEGATIVE
SARS-COV-2 RNA RESP QL NAA+PROBE: NEGATIVE
SODIUM SERPL-SCNC: 136 MMOL/L (ref 135–147)
WBC # BLD AUTO: 13.13 THOUSAND/UL (ref 4.31–10.16)

## 2024-03-31 PROCEDURE — 84703 CHORIONIC GONADOTROPIN ASSAY: CPT | Performed by: EMERGENCY MEDICINE

## 2024-03-31 PROCEDURE — 85025 COMPLETE CBC W/AUTO DIFF WBC: CPT | Performed by: EMERGENCY MEDICINE

## 2024-03-31 PROCEDURE — 99284 EMERGENCY DEPT VISIT MOD MDM: CPT

## 2024-03-31 PROCEDURE — 99284 EMERGENCY DEPT VISIT MOD MDM: CPT | Performed by: EMERGENCY MEDICINE

## 2024-03-31 PROCEDURE — 86140 C-REACTIVE PROTEIN: CPT | Performed by: EMERGENCY MEDICINE

## 2024-03-31 PROCEDURE — 86618 LYME DISEASE ANTIBODY: CPT | Performed by: EMERGENCY MEDICINE

## 2024-03-31 PROCEDURE — 80053 COMPREHEN METABOLIC PANEL: CPT | Performed by: EMERGENCY MEDICINE

## 2024-03-31 PROCEDURE — 84484 ASSAY OF TROPONIN QUANT: CPT | Performed by: EMERGENCY MEDICINE

## 2024-03-31 PROCEDURE — 0241U HB NFCT DS VIR RESP RNA 4 TRGT: CPT | Performed by: EMERGENCY MEDICINE

## 2024-03-31 PROCEDURE — 82550 ASSAY OF CK (CPK): CPT | Performed by: EMERGENCY MEDICINE

## 2024-03-31 PROCEDURE — 36415 COLL VENOUS BLD VENIPUNCTURE: CPT | Performed by: EMERGENCY MEDICINE

## 2024-03-31 PROCEDURE — 85652 RBC SED RATE AUTOMATED: CPT | Performed by: EMERGENCY MEDICINE

## 2024-03-31 RX ORDER — PREDNISONE 20 MG/1
40 TABLET ORAL ONCE
Status: DISCONTINUED | OUTPATIENT
Start: 2024-04-01 | End: 2024-04-01

## 2024-03-31 RX ORDER — PANTOPRAZOLE SODIUM 40 MG/1
40 TABLET, DELAYED RELEASE ORAL DAILY
COMMUNITY

## 2024-03-31 RX ORDER — METHYLPREDNISOLONE 4 MG/1
4 TABLET ORAL
COMMUNITY

## 2024-03-31 RX ORDER — TIZANIDINE HYDROCHLORIDE 4 MG/1
4 CAPSULE, GELATIN COATED ORAL DAILY
COMMUNITY

## 2024-03-31 RX ORDER — KETOROLAC TROMETHAMINE 30 MG/ML
15 INJECTION, SOLUTION INTRAMUSCULAR; INTRAVENOUS ONCE
Status: COMPLETED | OUTPATIENT
Start: 2024-04-01 | End: 2024-04-01

## 2024-03-31 RX ORDER — GABAPENTIN 400 MG/1
400 CAPSULE ORAL 2 TIMES DAILY
COMMUNITY

## 2024-04-01 VITALS
SYSTOLIC BLOOD PRESSURE: 126 MMHG | OXYGEN SATURATION: 99 % | DIASTOLIC BLOOD PRESSURE: 81 MMHG | RESPIRATION RATE: 14 BRPM | HEART RATE: 82 BPM | TEMPERATURE: 97.8 F

## 2024-04-01 LAB — B BURGDOR IGG+IGM SER QL IA: NEGATIVE

## 2024-04-01 PROCEDURE — 96374 THER/PROPH/DIAG INJ IV PUSH: CPT

## 2024-04-01 RX ADMIN — KETOROLAC TROMETHAMINE 15 MG: 30 INJECTION, SOLUTION INTRAMUSCULAR; INTRAVENOUS at 00:22

## 2024-04-01 NOTE — ED PROVIDER NOTES
Final Diagnosis:  1. Generalized body aches    2. Myalgia        Chief Complaint   Patient presents with    Generalized Body Aches     Generalized body aches and pains since December that no one can figure out yet. Also c/o weakness       HPI  Patient presents w/ myalgias and generalized weakness. She says she's had outpatient (aleja) EMG and rheum w/u. She sees an orthopedist for pain and is scheduled for a spinal MRI they're concerned about vertebral compressin. It's been going since December. Has had 3 workups that I have access to review. Seen in last few mo once for chest pain once for HA.     EMS additionally reports:     - Previous charting underwent limited review with attention to last ED visits, labs, ekgs, and prior imaging.  Chart review reveals :     Admission on 12/26/2023, Discharged on 12/26/2023   Component Date Value Ref Range Status    WBC 12/26/2023 6.64  4.31 - 10.16 Thousand/uL Final    RBC 12/26/2023 4.34  3.81 - 5.12 Million/uL Final    Hemoglobin 12/26/2023 14.1  11.5 - 15.4 g/dL Final    Hematocrit 12/26/2023 40.6  34.8 - 46.1 % Final    MCV 12/26/2023 94  82 - 98 fL Final    MCH 12/26/2023 32.5  26.8 - 34.3 pg Final    MCHC 12/26/2023 34.7  31.4 - 37.4 g/dL Final    RDW 12/26/2023 13.7  11.6 - 15.1 % Final    MPV 12/26/2023 9.7  8.9 - 12.7 fL Final    Platelets 12/26/2023 287  149 - 390 Thousands/uL Final    nRBC 12/26/2023 0  /100 WBCs Final    Neutrophils Relative 12/26/2023 62  43 - 75 % Final    Immature Grans % 12/26/2023 0  0 - 2 % Final    Lymphocytes Relative 12/26/2023 29  14 - 44 % Final    Monocytes Relative 12/26/2023 7  4 - 12 % Final    Eosinophils Relative 12/26/2023 2  0 - 6 % Final    Basophils Relative 12/26/2023 0  0 - 1 % Final    Neutrophils Absolute 12/26/2023 4.11  1.85 - 7.62 Thousands/µL Final    Absolute Immature Grans 12/26/2023 0.02  0.00 - 0.20 Thousand/uL Final    Absolute Lymphocytes 12/26/2023 1.93  0.60 - 4.47 Thousands/µL Final    Absolute Monocytes  12/26/2023 0.43  0.17 - 1.22 Thousand/µL Final    Eosinophils Absolute 12/26/2023 0.13  0.00 - 0.61 Thousand/µL Final    Basophils Absolute 12/26/2023 0.02  0.00 - 0.10 Thousands/µL Final    Sodium 12/26/2023 141  135 - 147 mmol/L Final    Potassium 12/26/2023 3.2 (L)  3.5 - 5.3 mmol/L Final    Chloride 12/26/2023 107  96 - 108 mmol/L Final    CO2 12/26/2023 27  21 - 32 mmol/L Final    ANION GAP 12/26/2023 7  mmol/L Final    BUN 12/26/2023 5  5 - 25 mg/dL Final    Creatinine 12/26/2023 0.78  0.60 - 1.30 mg/dL Final    Standardized to IDMS reference method    Glucose 12/26/2023 83  65 - 140 mg/dL Final    If the patient is fasting, the ADA then defines impaired fasting glucose as > 100 mg/dL and diabetes as > or equal to 123 mg/dL.    Calcium 12/26/2023 8.8  8.4 - 10.2 mg/dL Final    AST 12/26/2023 38  13 - 39 U/L Final    ALT 12/26/2023 39  7 - 52 U/L Final    Specimen collection should occur prior to Sulfasalazine administration due to the potential for falsely depressed results.     Alkaline Phosphatase 12/26/2023 71  34 - 104 U/L Final    Total Protein 12/26/2023 6.2 (L)  6.4 - 8.4 g/dL Final    Albumin 12/26/2023 4.0  3.5 - 5.0 g/dL Final    Total Bilirubin 12/26/2023 0.39  0.20 - 1.00 mg/dL Final    Use of this assay is not recommended for patients undergoing treatment with eltrombopag due to the potential for falsely elevated results.  N-acetyl-p-benzoquinone imine (metabolite of Acetaminophen) will generate erroneously low results in samples for patients that have taken an overdose of Acetaminophen.    eGFR 12/26/2023 100  ml/min/1.73sq m Final    EXT Preg Test, Ur 12/26/2023 Negative   Final    Control 12/26/2023 Valid   Final    Color, UA 12/26/2023 Yellow   Final    Clarity, UA 12/26/2023 Slightly Cloudy   Final    Specific Gravity, UA 12/26/2023 >=1.030  1.000 - 1.030 Final    pH, UA 12/26/2023 6.0  5.0, 5.5, 6.0, 6.5, 7.0, 7.5, 8.0, 8.5, 9.0 Final    Leukocytes, UA 12/26/2023 Negative  Negative Final     Nitrite, UA 12/26/2023 Negative  Negative Final    Protein, UA 12/26/2023 Negative  Negative mg/dl Final    Glucose, UA 12/26/2023 Negative  Negative mg/dl Final    Ketones, UA 12/26/2023 Trace (A)  Negative mg/dl Final    Urobilinogen, UA 12/26/2023 1.0  0.2, 1.0 E.U./dl E.U./dl Final    Bilirubin, UA 12/26/2023 Small (A)  Negative Final    Occult Blood, UA 12/26/2023 Moderate (A)  Negative Final    Lipase 12/26/2023 6 (L)  11 - 82 u/L Final    RBC, UA 12/26/2023 4-10 (A)  None Seen, 0-1, 1-2, 2-4, 0-5 /hpf Final    WBC, UA 12/26/2023 2-4  None Seen, 0-1, 1-2, 0-5, 2-4 /hpf Final    Epithelial Cells 12/26/2023 Moderate (A)  None Seen, Occasional /hpf Final    Bacteria, UA 12/26/2023 Innumerable (A)  None Seen, Occasional /hpf Final    MUCUS THREADS 12/26/2023 Occasional (A)  None Seen Final       - No language barrier.   - History obtained from patient    - Discuss patient's care, with patient permission or by chart review, with      PMH:   has a past medical history of Anxiety disorder, Bipolar 1 disorder (HCC), Diverticulitis, Hypertension, and PTSD (post-traumatic stress disorder).    PSH:   has a past surgical history that includes Cholecystectomy.     Social History:  Tobacco Use: High Risk (3/31/2024)    Patient History     Smoking Tobacco Use: Every Day     Smokeless Tobacco Use: Never     Passive Exposure: Not on file     Alcohol Use: Not At Risk (12/18/2020)    Received from [facility]    AUDIT-C     Frequency of Alcohol Consumption: Never     Average Number of Drinks: Not on file     Frequency of Binge Drinking: Not on file     No illicit use       ROS:  Pertinent positives/negatives: .     Some ROS may be present in the HPI and would take precedent over these standard questions asked below.   Review of Systems   Constitutional:  Positive for fatigue. Negative for chills and fever.   Cardiovascular:  Positive for chest pain. Negative for palpitations and leg swelling.    Musculoskeletal:  Positive for arthralgias and myalgias. Negative for neck pain and neck stiffness.        CONSTITUTIONAL:  No lethargy. No unexpected weight loss. No change in behavior.  EYES:  No pain, redness, or discharge. No loss of vision. No orbital trauma or pain.   ENT:  No tinnitus or decreased hearing. No epistaxis/purulent rhinorrhea. No voice change, airway closing, trismus.   CARDIOVASCULAR:  No skin mottling or pallor. No change in exertional capacity  RESPIRATORY:  No hemoptysis. No paroxysmal nocturnal dyspnea. No stridor. No apnea or bluing.   GASTROINTESTINAL:  No vomiting, diarrhea. No distension. No melena. No hematochezia.   GENITOURINARY:  No nocturia. No hematuria or foul smelling or cloudy urine. No discharge. No sores/adenopathy.   MUSCULOSKELETAL:  No contracture.  No new deformity.   INTEGUMENTARY:  No swelling. No unexpected contusions. No abrasions. No lymphangitis.  NEUROLOGIC:  No meningismus. No new numbness of the extremities. No new focal weakness. No postural instability  PSYCHIATRIC:  No SI HI AVH  HEMATOLOGICAL:  No bleeding. No petechiae. No bruising.  ALLERGIES:  No urticaria. No sudden abd cramping. No stridor.    PE:     Physical exam highlights:   Physical Exam       Vitals:    03/31/24 2154 04/01/24 0026   BP: 139/87 126/81   BP Location: Right arm Right arm   Pulse: 78 82   Resp: 16 14   Temp: 97.8 °F (36.6 °C)    TempSrc: Tympanic    SpO2: 100% 99%     Vitals reviewed by me.   Nursing note reviewed  Chaperone present for all sensitive exam.  Const: No acute distress. Alert. Nontoxic. Not diaphoretic.    HEENT: External ears normal. No protrusion drainage swelling. Nose normal. No drainage/traumatic deformity. MM. Mouth with baseline/symmetric movement. No trismus.   Eyes: No squinting. No icterus. No tearing/swelling/drainage. Tracks through the room with normal EOM.   Neck: ROM normal. No rigidity. No meningismus.  Cards: Rate as per vitals Compared to monitor sinus  "unless documented. Regular Well perfused.  Pulm: Effort and excursion normal. No distress. No audible wheezing/no stridor. Normal resp rate without retraction or change in work of breathing.  Abd: No distension beyond baseline. No fluctuant wave. Patient without peritoneal pain with shifting/bumping the bed.   MSK: ROM normal baseline. No deformity. No contractures from baseline.   Skin: No new rashes visible. Well perfused. No wounds visualized on exposed skin  Neuro: Nonfocal. Baseline. CN grossly intact. Moving all four with coordination.   Psych: Normal behavior and affect.        A:  - Nursing note reviewed.    Ddx and MDM  Considered diagnoses  Had a tick on her  Prev checked for lyme per hx, but I cannot see results.   Will recheck  Check ehrlichiosis  Never had rash, unlikely RMSF  Doesn't sound like babe  No travel    Rheum  Check inflamm  Had JESSICA already  Check CK  No elevation    neuro  Scheduled for MRI  No indication like acute change in weakness/neuro def incontinence retention etc for emergent  Continue outpatient  Reports already had EMG            Dispo decision continue outpaitent f/u      My conversation with consultant reveals:        Decision rules:                    ED Course as of 04/01/24 0555   Mon Apr 01, 2024   0016 \"This is ridiculous. It's like a TV show except in the TV shows the doctors at least figure out what's wrong with you\"   0016 Had EMG and rheum w/u at [facility]         My read of the XR/CT scan reveals:   No orders to display       Orders Placed This Encounter   Procedures    COVID/FLU/RSV    CBC and differential    Comprehensive metabolic panel    CK    Sedimentation rate, automated    C-reactive protein    hCG, qualitative pregnancy    HS Troponin 0hr (reflex protocol)    Lyme Total AB W Reflex to IGM/IGG    Lyme Total AB W Reflex to IGM/IGG     Labs Reviewed   CBC AND DIFFERENTIAL - Abnormal       Result Value Ref Range Status    WBC 13.13 (*) 4.31 - 10.16 Thousand/uL " Final    RBC 4.51  3.81 - 5.12 Million/uL Final    Hemoglobin 14.2  11.5 - 15.4 g/dL Final    Hematocrit 42.1  34.8 - 46.1 % Final    MCV 93  82 - 98 fL Final    MCH 31.5  26.8 - 34.3 pg Final    MCHC 33.7  31.4 - 37.4 g/dL Final    RDW 12.5  11.6 - 15.1 % Final    MPV 9.5  8.9 - 12.7 fL Final    Platelets 328  149 - 390 Thousands/uL Final    nRBC 0  /100 WBCs Final    Neutrophils Relative 77 (*) 43 - 75 % Final    Immature Grans % 0  0 - 2 % Final    Lymphocytes Relative 17  14 - 44 % Final    Monocytes Relative 5  4 - 12 % Final    Eosinophils Relative 1  0 - 6 % Final    Basophils Relative 0  0 - 1 % Final    Neutrophils Absolute 10.07 (*) 1.85 - 7.62 Thousands/µL Final    Absolute Immature Grans 0.05  0.00 - 0.20 Thousand/uL Final    Absolute Lymphocytes 2.19  0.60 - 4.47 Thousands/µL Final    Absolute Monocytes 0.65  0.17 - 1.22 Thousand/µL Final    Eosinophils Absolute 0.13  0.00 - 0.61 Thousand/µL Final    Basophils Absolute 0.04  0.00 - 0.10 Thousands/µL Final   COMPREHENSIVE METABOLIC PANEL - Abnormal    Sodium 136  135 - 147 mmol/L Final    Potassium 3.6  3.5 - 5.3 mmol/L Final    Chloride 104  96 - 108 mmol/L Final    CO2 24  21 - 32 mmol/L Final    ANION GAP 8  4 - 13 mmol/L Final    BUN 8  5 - 25 mg/dL Final    Creatinine 0.75  0.60 - 1.30 mg/dL Final    Comment: Standardized to IDMS reference method    Glucose 107  65 - 140 mg/dL Final    Comment: If the patient is fasting, the ADA then defines impaired fasting glucose as > 100 mg/dL and diabetes as > or equal to 123 mg/dL.    Calcium 8.5  8.4 - 10.2 mg/dL Final    AST 11 (*) 13 - 39 U/L Final    ALT 11  7 - 52 U/L Final    Comment: Specimen collection should occur prior to Sulfasalazine administration due to the potential for falsely depressed results.     Alkaline Phosphatase 70  34 - 104 U/L Final    Total Protein 6.3 (*) 6.4 - 8.4 g/dL Final    Albumin 4.0  3.5 - 5.0 g/dL Final    Total Bilirubin 0.24  0.20 - 1.00 mg/dL Final    Comment: Use of  this assay is not recommended for patients undergoing treatment with eltrombopag due to the potential for falsely elevated results.  N-acetyl-p-benzoquinone imine (metabolite of Acetaminophen) will generate erroneously low results in samples for patients that have taken an overdose of Acetaminophen.    eGFR 105  ml/min/1.73sq m Final    Narrative:     National Kidney Disease Foundation guidelines for Chronic Kidney Disease (CKD):     Stage 1 with normal or high GFR (GFR > 90 mL/min/1.73 square meters)    Stage 2 Mild CKD (GFR = 60-89 mL/min/1.73 square meters)    Stage 3A Moderate CKD (GFR = 45-59 mL/min/1.73 square meters)    Stage 3B Moderate CKD (GFR = 30-44 mL/min/1.73 square meters)    Stage 4 Severe CKD (GFR = 15-29 mL/min/1.73 square meters)    Stage 5 End Stage CKD (GFR <15 mL/min/1.73 square meters)  Note: GFR calculation is accurate only with a steady state creatinine   COVID19, INFLUENZA A/B, RSV PCR, [facility] - Normal    SARS-CoV-2 Negative  Negative Final    INFLUENZA A PCR Negative  Negative Final    INFLUENZA B PCR Negative  Negative Final    RSV PCR Negative  Negative Final    Narrative:     FOR PEDIATRIC PATIENTS - copy/paste COVID Guidelines URL to browser: [email or web address]    SARS-CoV-2 assay is a Nucleic Acid Amplification assay intended for the  qualitative detection of nucleic acid from SARS-CoV-2 in nasopharyngeal  swabs. Results are for the presumptive identification of SARS-CoV-2 RNA.    Positive results are indicative of infection with SARS-CoV-2, the virus  causing COVID-19, but do not rule out bacterial infection or co-infection  with other viruses. Laboratories within the [place] and its  territories are required to report all positive results to the appropriate  public health authorities. Negative results do not preclude SARS-CoV-2  infection and should not be used as the sole basis for treatment or other  patient management  "decisions. Negative results must be combined with  clinical observations, patient history, and epidemiological information.  This test has not been FDA cleared or approved.    This test has been authorized by FDA under an Emergency Use Authorization  (EUA). This test is only authorized for the duration of time the  declaration that circumstances exist justifying the authorization of the  emergency use of an in vitro diagnostic tests for detection of SARS-CoV-2  virus and/or diagnosis of COVID-19 infection under section 564(b)(1) of  the Act, 21 U.S.C. 360bbb-3(b)(1), unless the authorization is terminated  or revoked sooner. The test has been validated but independent review by FDA  and CLIA is pending.    Test performed using [employer] Gene[employer] Valuespert: This RT-PCR assay targets N2,  a region unique to SARS-CoV-2. A conserved region in the E-gene was chosen  for pan-Sarbecovirus detection which includes SARS-CoV-2.    According to CMS-2020-01-R, this platform meets the definition of high-throughput technology.   CK - Normal    Total CK 43  26 - 192 U/L Final   SEDIMENTATION RATE - Normal    Sed Rate 6  0 - 19 mm/hour Final   C-REACTIVE PROTEIN - Normal    CRP 1.3  <3.0 mg/L Final   PREGNANCY TEST (HCG QUALITATIVE) - Normal    Preg, Serum Negative  Negative Final   HS TROPONIN I 0HR - Normal    hs TnI 0hr 2  \"Refer to ACS Flowchart\"- see link ng/L Final    Comment:                                              Initial (time 0) result  If >=50 ng/L, Myocardial injury suggested ;  Type of myocardial injury and treatment strategy  to be determined.  If 5-49 ng/L, a delta result at 2 hours and or 4 hours will be needed to further evaluate.  If <4 ng/L, and chest pain has been >3 hours since onset, patient may qualify for discharge based on the HEART score in the ED.  If <5 ng/L and <3hours since onset of chest pain, a delta result at 2 hours will be needed to further evaluate.    HS Troponin 99th Percentile URL of a Health " Population=12 ng/L with a 95% Confidence Interval of 8-18 ng/L.    Second Troponin (time 2 hours)  If calculated delta >= 20 ng/L,  Myocardial injury suggested ; Type of myocardial injury and treatment strategy to be determined.  If 5-49 ng/L and the calculated delta is 5-19 ng/L, consult medical service for evaluation.  Continue evaluation for ischemia on ecg and other possible etiology and repeat hs troponin at 4 hours.  If delta is <5 ng/L at 2 hours, consider discharge based on risk stratification via the HEART score (if in ED), or DOUGIE risk score in IP/Observation.    HS Troponin 99th Percentile URL of a Health Population=12 ng/L with a 95% Confidence Interval of 8-18 ng/L.   LYME TOTAL AB W REFLEX TO IGM/IGG    Narrative:     The following orders were created for panel order Lyme Total AB W Reflex to IGM/IGG.  Procedure                               Abnormality         Status                     ---------                               -----------         ------                     Lyme Total AB W Reflex t...[[id number]]                      In process                   Please view results for these tests on the individual orders.   LYME TOTAL AB W REFLEX TO IGM/IGG       *Each of these labs was reviewed. Particular standout labs will be noted in the ED Course above     Final Diagnosis:  1. Generalized body aches    2. Myalgia          P:  - hospital tx includes   Medications   ketorolac (TORADOL) injection 15 mg (15 mg Intravenous Given 4/1/24 0022)         - disposition  Time reflects when diagnosis was documented in both MDM as applicable and the Disposition within this note       Time User Action Codes Description Comment    3/31/2024 11:50 PM Jon Mckeon [R52] Generalized body aches     3/31/2024 11:56 PM Jon Mckeon [M79.10] Myalgia     3/31/2024 11:56 PM Jon Mckeon [R07.9] Chest pain     3/31/2024 11:56 PM Jon Mckeon Remove [R07.9] Chest pain           ED Disposition        ED Disposition   Discharge    Condition   Stable    Date/Time   Sun Mar 31, 2024 11:50 PM    Comment   Sandi Gaston discharge to home/self care.                   Follow-up Information    None         - patient will call their PCP to let them know they were in the emergency department. We discuss return precautions and patient is agreeable with plan and aformentioned disposition.       - additional treatment intended, if consistent with primary provider:  - patient to follow with :      Discharge Medication List as of 4/1/2024 12:17 AM        CONTINUE these medications which have NOT CHANGED    Details   Ergocalciferol (DRISDOL PO) Take by mouth, Historical Med      gabapentin (NEURONTIN) 400 mg capsule Take 400 mg by mouth 2 (two) times a day, Historical Med      Lumateperone (Caplyta) 42 MG CAPS capsule Take 42 mg by mouth daily, Historical Med      methylprednisolone (MEDROL) 4 mg tablet Take 4 mg by mouth Blister pack, Historical Med      pantoprazole (PROTONIX) 40 mg tablet Take 40 mg by mouth daily, Historical Med      TiZANidine (ZANAFLEX) 4 MG capsule Take 4 mg by mouth in the morning, Historical Med      desvenlafaxine (PRISTIQ) 100 mg 24 hr tablet Take 25 mg by mouth daily, Historical Med      dicyclomine (BENTYL) 20 mg tablet Take 1 tablet (20 mg total) by mouth 2 (two) times a day, Starting Sun 2/5/2023, Normal      famotidine (PEPCID) 20 mg tablet Take 1 tablet (20 mg total) by mouth 2 (two) times a day for 7 days, Starting Sat 5/22/2021, Until Sat 5/29/2021, Normal      guaifenesin-codeine (GUAIFENESIN AC) 100-10 MG/5ML liquid Take 5 mL by mouth 4 (four) times a day as needed for cough, Starting Sun 10/1/2023, Normal      hydrOXYzine HCL (ATARAX) 25 mg tablet Take 25 mg by mouth 3 (three) times a day, Historical Med      Melatonin 10 MG TABS Take 10 mg by mouth daily at bedtime, Historical Med      nicotine (NICODERM CQ) 21 mg/24 hr TD 24 hr patch Place 1 patch on the skin daily, Starting  Fri 3/18/2022, Normal      ondansetron (ZOFRAN) 4 mg tablet Take 1-2 tablets (4-8 mg total) by mouth every 8 (eight) hours as needed for nausea or vomiting, Starting Tue 12/26/2023, Normal      ondansetron (ZOFRAN-ODT) 4 mg disintegrating tablet Take 1 tablet (4 mg total) by mouth every 8 (eight) hours as needed for nausea or vomiting, Starting Sun 2/5/2023, Normal      rOPINIRole (REQUIP) 0.5 mg tablet Take 0.5 mg by mouth 2 (two) times a day, Historical Med      triamcinolone (KENALOG) 0.025 % cream Apply topically 2 (two) times a day, Starting Sun 10/1/2023, Normal      ziprasidone (GEODON) 60 mg capsule Take 60 mg by mouth in the morning, Historical Med           No discharge procedures on file.  Prior to Admission Medications   Prescriptions Last Dose Informant Patient Reported? Taking?   Ergocalciferol (DRISDOL PO)   Yes Yes   Sig: Take by mouth   Lumateperone (Caplyta) 42 MG CAPS capsule   Yes Yes   Sig: Take 42 mg by mouth daily   Melatonin 10 MG TABS  Self Yes No   Sig: Take 10 mg by mouth daily at bedtime   Patient not taking: Reported on 12/26/2023   TiZANidine (ZANAFLEX) 4 MG capsule   Yes Yes   Sig: Take 4 mg by mouth in the morning   desvenlafaxine (PRISTIQ) 100 mg 24 hr tablet  Self Yes No   Sig: Take 25 mg by mouth daily   dicyclomine (BENTYL) 20 mg tablet   No No   Sig: Take 1 tablet (20 mg total) by mouth 2 (two) times a day   Patient not taking: Reported on 12/26/2023   famotidine (PEPCID) 20 mg tablet   No No   Sig: Take 1 tablet (20 mg total) by mouth 2 (two) times a day for 7 days   gabapentin (NEURONTIN) 400 mg capsule   Yes Yes   Sig: Take 400 mg by mouth 2 (two) times a day   guaifenesin-codeine (GUAIFENESIN AC) 100-10 MG/5ML liquid   No No   Sig: Take 5 mL by mouth 4 (four) times a day as needed for cough   Patient not taking: Reported on 12/26/2023   hydrOXYzine HCL (ATARAX) 25 mg tablet   Yes No   Sig: Take 25 mg by mouth 3 (three) times a day   Patient not taking: Reported on 12/26/2023  "  methylprednisolone (MEDROL) 4 mg tablet   Yes Yes   Sig: Take 4 mg by mouth Blister pack   nicotine (NICODERM CQ) 21 mg/24 hr TD 24 hr patch   No No   Sig: Place 1 patch on the skin daily   Patient not taking: Reported on 2/4/2023   ondansetron (ZOFRAN) 4 mg tablet   No No   Sig: Take 1-2 tablets (4-8 mg total) by mouth every 8 (eight) hours as needed for nausea or vomiting   ondansetron (ZOFRAN-ODT) 4 mg disintegrating tablet   No No   Sig: Take 1 tablet (4 mg total) by mouth every 8 (eight) hours as needed for nausea or vomiting   pantoprazole (PROTONIX) 40 mg tablet   Yes Yes   Sig: Take 40 mg by mouth daily   rOPINIRole (REQUIP) 0.5 mg tablet   Yes No   Sig: Take 0.5 mg by mouth 2 (two) times a day   triamcinolone (KENALOG) 0.025 % cream   No No   Sig: Apply topically 2 (two) times a day   ziprasidone (GEODON) 60 mg capsule   Yes No   Sig: Take 60 mg by mouth in the morning   Patient not taking: Reported on 12/26/2023      Facility-Administered Medications: None       Portions of the record may have been created with voice recognition software. Occasional wrong word or \"sound a like\" substitutions may have occurred due to the inherent limitations of voice recognition software. Read the chart carefully and recognize, using context, where substitutions have occurred.    Electronically signed by:  MD Jon Leblanc MD  04/01/24 0600    "

## 2024-04-05 ENCOUNTER — EVALUATION (OUTPATIENT)
Dept: PHYSICAL THERAPY | Facility: CLINIC | Age: 34
End: 2024-04-05
Payer: COMMERCIAL

## 2024-04-05 DIAGNOSIS — M54.12 CERVICAL RADICULOPATHY: Primary | ICD-10-CM

## 2024-04-05 PROCEDURE — 97161 PT EVAL LOW COMPLEX 20 MIN: CPT | Performed by: PHYSICAL THERAPIST

## 2024-04-05 NOTE — LETTER
2024    Behnam Salari, DO  [address] [facility]  [address] [place] [address]    Patient: Sandi Gaston   YOB: 1990   Date of Visit: 2024     Encounter Diagnosis     ICD-10-CM    1. Cervical radiculopathy  M54.12           Dear Dr. Ridley:    Thank you for your recent referral of Sandi Gaston. Please review the attached evaluation summary from Sandi's recent visit.     Please verify that you agree with the plan of care by signing the attached order.     If you have any questions or concerns, please do not hesitate to call.     I sincerely appreciate the opportunity to share in the care of one of your patients and hope to have another opportunity to work with you in the near future.       Sincerely,    Tobias Reyes, PT      Referring Provider:      I certify that I have read the below Plan of Care and certify the need for these services furnished under this plan of treatment while under my care.                    Behnam Salari, DO  [address]  Via Fax: [phone]          PT Evaluation     Today's date: 2024  Patient name: Sandi Gaston  : 1990  MRN: [id number]  Referring provider: Salari, Behnam, DO  Dx:   Encounter Diagnosis     ICD-10-CM    1. Cervical radiculopathy  M54.12                      Assessment  Assessment details: Sandi Gastonpresents with signs and symptoms consistent with Cervical radiculopathy  (primary encounter diagnosis), with loss of range of motion, strength and spinal stabilization.  Presents with high reactivity.  Sandi Gaston would benefit with physical therapy to address these impairments to return to prior level of function.     Impairments: abnormal or restricted ROM, activity intolerance, impaired physical strength, lacks appropriate home exercise program, pain with function, scapular dyskinesis and poor posture     Goals  STG  Initiate HEP  Decrease pain by 50% in 3 weeks  Patient  performing HEP 50% of time in 3 weeks  LTG  Independent with HEP  Decrease pain by 90% in 6 weeks  Patient performing HEP 90% of time in 6 weeks  FOTO >  44   in 6 weeks     Plan  Planned therapy interventions: manual therapy, neuromuscular re-education, patient education, postural training, strengthening, stretching, therapeutic exercise and home exercise program  Frequency: 2x week  Duration in visits: 12  Duration in weeks: 6  Treatment plan discussed with: patient    Subjective Evaluation    History of Present Illness  Mechanism of injury: Patient reports bilateral neck pain that began in , after an illness with salaminolla.  The pain radiates to both shoulders and between the shoulder blades.  Past treatment include chiropractic and medications.  She reports daily headache, intensity fluctuates daily.            Recurrent probem    Quality of life: good    Patient Goals  Patient goals for therapy: decreased pain, increased motion, increased strength, independence with ADLs/IADLs and return to sport/leisure activities    Pain  Current pain ratin  At best pain ratin  At worst pain ratin  Location: neck pain  Quality: dull ache, needle-like, discomfort and pressure  Relieving factors: rest and heat  Aggravating factors: lifting and overhead activity  Progression: no change    Treatments  Current treatment: physical therapy      Objective     Concurrent Complaints  Positive for night pain, disturbed sleep, headaches, nausea/motion sickness and tinnitus. Negative for dizziness and faints    Postural Observations  Seated posture: poor  Standing posture: poor  Correction of posture: makes symptoms better      Active Range of Motion   Cervical/Thoracic Spine       Cervical    Flexion: 80 degrees  with pain  Extension: 40 degrees      Left rotation: 80 degrees with pain  Right rotation: 75 degrees       Strength/Myotome Testing   Cervical Spine     Left   Normal strength    Right   Normal  strength  Neuro Exam:     Headaches   Patient reports headaches: Yes.            Precautions: Medical History    Diagnosis Date Comment Source   Anxiety disorder      Bipolar 1 disorder (HCC)      Diverticulitis      Hypertension      PTSD (post-traumatic stress disorder)            Manuals                                                                 Neuro Re-Ed                                                                                                        Ther Ex                                                                                                                     Ther Activity                                       Gait Training                                       Modalities                                                              O-T Plasty Text: The defect edges were debeveled with a #15 scalpel blade.  Given the location of the defect, shape of the defect and the proximity to free margins an O-T plasty was deemed most appropriate.  Using a sterile surgical marker, an appropriate O-T plasty was drawn incorporating the defect and placing the expected incisions within the relaxed skin tension lines where possible.    The area thus outlined was incised deep to adipose tissue with a #15 scalpel blade.  The skin margins were undermined to an appropriate distance in all directions utilizing iris scissors.

## 2024-04-05 NOTE — PROGRESS NOTES
PT Evaluation     Today's date: 2024  Patient name: Sandi Gaston  : 1990  MRN: [id number]  Referring provider: Salari, Behnam, DO  Dx:   Encounter Diagnosis     ICD-10-CM    1. Cervical radiculopathy  M54.12                      Assessment  Assessment details: Sandi Gastonpresents with signs and symptoms consistent with Cervical radiculopathy  (primary encounter diagnosis), with loss of range of motion, strength and spinal stabilization.  Presents with high reactivity.  Sandi Gaston would benefit with physical therapy to address these impairments to return to prior level of function.     Impairments: abnormal or restricted ROM, activity intolerance, impaired physical strength, lacks appropriate home exercise program, pain with function, scapular dyskinesis and poor posture     Goals  STG  Initiate HEP  Decrease pain by 50% in 3 weeks  Patient performing HEP 50% of time in 3 weeks  LTG  Independent with HEP  Decrease pain by 90% in 6 weeks  Patient performing HEP 90% of time in 6 weeks  FOTO >  44   in 6 weeks     Plan  Planned therapy interventions: manual therapy, neuromuscular re-education, patient education, postural training, strengthening, stretching, therapeutic exercise and home exercise program  Frequency: 2x week  Duration in visits: 12  Duration in weeks: 6  Treatment plan discussed with: patient    Subjective Evaluation    History of Present Illness  Mechanism of injury: Patient reports bilateral neck pain that began in , after an illness with salaminolla.  The pain radiates to both shoulders and between the shoulder blades.  Past treatment include chiropractic and medications.  She reports daily headache, intensity fluctuates daily.            Recurrent probem    Quality of life: good    Patient Goals  Patient goals for therapy: decreased pain, increased motion, increased strength, independence with ADLs/IADLs and return to sport/leisure activities    Pain  Current pain  ratin  At best pain ratin  At worst pain ratin  Location: neck pain  Quality: dull ache, needle-like, discomfort and pressure  Relieving factors: rest and heat  Aggravating factors: lifting and overhead activity  Progression: no change    Treatments  Current treatment: physical therapy      Objective     Concurrent Complaints  Positive for night pain, disturbed sleep, headaches, nausea/motion sickness and tinnitus. Negative for dizziness and faints    Postural Observations  Seated posture: poor  Standing posture: poor  Correction of posture: makes symptoms better      Active Range of Motion   Cervical/Thoracic Spine       Cervical    Flexion: 80 degrees  with pain  Extension: 40 degrees      Left rotation: 80 degrees with pain  Right rotation: 75 degrees       Strength/Myotome Testing   Cervical Spine     Left   Normal strength    Right   Normal strength  Neuro Exam:     Headaches   Patient reports headaches: Yes.            Precautions: Medical History    Diagnosis Date Comment Source   Anxiety disorder      Bipolar 1 disorder (HCC)      Diverticulitis      Hypertension      PTSD (post-traumatic stress disorder)            Manuals                                                                 Neuro Re-Ed                                                                                                        Ther Ex                                                                                                                     Ther Activity                                       Gait Training                                       Modalities

## 2024-04-08 ENCOUNTER — OFFICE VISIT (OUTPATIENT)
Dept: PHYSICAL THERAPY | Facility: CLINIC | Age: 34
End: 2024-04-08
Payer: COMMERCIAL

## 2024-04-08 DIAGNOSIS — M54.12 CERVICAL RADICULOPATHY: Primary | ICD-10-CM

## 2024-04-08 PROCEDURE — 97140 MANUAL THERAPY 1/> REGIONS: CPT

## 2024-04-08 PROCEDURE — 97110 THERAPEUTIC EXERCISES: CPT

## 2024-04-08 NOTE — PROGRESS NOTES
Daily Note     Today's date: 2024  Patient name: Sandi Gaston  : 1990  MRN: [id number]  Referring provider: Salari, Behnam, DO  Dx:   Encounter Diagnosis     ICD-10-CM    1. Cervical radiculopathy  M54.12                      Subjective: My neck is very painful today. I got the results back from my neck MRI, still waiting for low back MRI results. I see the Dr on Thursday to discuss the neck MRI. My headaches aren't too bad, but I take Excedrin daily.      Objective: See treatment diary below      Assessment: Tolerated treatment fair. Patient demonstrated fatigue post treatment and would benefit from continued PT      Plan: Continue per plan of care.      Precautions: Medical History    Diagnosis Date Comment Source   Anxiety disorder      Bipolar 1 disorder (HCC)      Diverticulitis      Hypertension      PTSD (post-traumatic stress disorder)            Manuals EVAL  2024             [facility] - T/S regions (seated)                                                  Neuro Re-Ed                                                                                                          Ther Ex             C/S AROM  5 x each as tolerated           Scap retractions   20x 5 sec hold against 1/2 roll           pulleys  20x           FIS   10x 3 sec hold              Seated shoulder ER w/ scap retractions  10x 5 sec hold                                                   Ther Activity                                       Gait Training                                       Modalities             MH to C/S area   Seated x 8 minutes

## 2024-04-09 ENCOUNTER — APPOINTMENT (OUTPATIENT)
Dept: LAB | Facility: CLINIC | Age: 34
End: 2024-04-09
Payer: COMMERCIAL

## 2024-04-09 DIAGNOSIS — Z00.6 ENCOUNTER FOR EXAMINATION FOR NORMAL COMPARISON OR CONTROL IN CLINICAL RESEARCH PROGRAM: ICD-10-CM

## 2024-04-09 PROCEDURE — 36415 COLL VENOUS BLD VENIPUNCTURE: CPT

## 2024-04-11 ENCOUNTER — OFFICE VISIT (OUTPATIENT)
Dept: PHYSICAL THERAPY | Facility: CLINIC | Age: 34
End: 2024-04-11
Payer: COMMERCIAL

## 2024-04-11 DIAGNOSIS — M54.12 CERVICAL RADICULOPATHY: Primary | ICD-10-CM

## 2024-04-11 PROCEDURE — 97140 MANUAL THERAPY 1/> REGIONS: CPT | Performed by: PHYSICAL THERAPIST

## 2024-04-11 NOTE — PROGRESS NOTES
Daily Note     Today's date: 2024  Patient name: Sandi Gaston  : 1990  MRN: [id number]  Referring provider: Salari, Behnam, DO  Dx:   Encounter Diagnosis     ICD-10-CM    1. Cervical radiculopathy  M54.12                      Subjective: I am in severe pain with numbness in my left fingers, to see neuro-surgent today, due to concerns of cord compression noted on MRI.      Objective: See treatment diary below      Assessment: Tolerated treatment fair. Patient demonstrated fatigue post treatment and exhibited good technique with therapeutic exercises      Plan: Continue per plan of care.      Precautions: Medical History    Diagnosis Date Comment Source   Anxiety disorder      Bipolar 1 disorder (HCC)      Diverticulitis      Hypertension      PTSD (post-traumatic stress disorder)            Manuals EVAL  2024            [facility] - T/S regions (seated) [facility]   [facility]                                                 Neuro Re-Ed                                                                                                          Ther Ex             C/S AROM  5 x each as tolerated           Scap retractions   20x 5 sec hold against 1/2 roll           pulleys  20x           FIS   10x 3 sec hold              Seated shoulder ER w/ scap retractions  10x 5 sec hold                                                   Ther Activity                                       Gait Training                                       Modalities             MH to C/S area   Seated x 8 minutes  x10m

## 2024-04-22 ENCOUNTER — OFFICE VISIT (OUTPATIENT)
Dept: PHYSICAL THERAPY | Facility: CLINIC | Age: 34
End: 2024-04-22
Payer: COMMERCIAL

## 2024-04-22 DIAGNOSIS — M54.12 CERVICAL RADICULOPATHY: Primary | ICD-10-CM

## 2024-04-22 PROCEDURE — 97112 NEUROMUSCULAR REEDUCATION: CPT | Performed by: PHYSICAL THERAPIST

## 2024-04-22 NOTE — PROGRESS NOTES
Daily Note     Today's date: 2024  Patient name: Sandi Gaston  : 1990  MRN: [id number]  Referring provider: Salari, Behnam, DO  Dx:   Encounter Diagnosis     ICD-10-CM    1. Cervical radiculopathy  M54.12                      Subjective: So far no improvement, neck and back pain persist.      Objective: See treatment diary below  Pre-[place] Cervical Rot AROM  ROT 50%   Post [place] C-ROM 80%    Assessment: Tolerated treatment well. Patient demonstrated fatigue post treatment and exhibited good technique with therapeutic exercises      Plan: Continue per plan of care.      Precautions: Medical History    Diagnosis Date Comment Source   Anxiety disorder      Bipolar 1 disorder (HCC)      Diverticulitis      Hypertension      PTSD (post-traumatic stress disorder)            Manuals EVAL  2024            [facility] [place] - T/S regions (seated) [facility]                                                 Neuro Re-Ed               Neurac supine pelvic lift   2x5          Nerac Bridge   2x5          Neurac SDLY hip ADD/ABD   2x5  2x5          Neurac scap retract supine   2x5          Neurac Cerv retract supine   2x5          Neurac scap retract w deression sit   2x5          Neurac T-spine Ext   2x5          Ther Ex             C/S AROM  5 x each as tolerated           Scap retractions   20x 5 sec hold against 1/2 roll           pulleys  20x           FIS   10x 3 sec hold              Seated shoulder ER w/ scap retractions  10x 5 sec hold                                                   Ther Activity                                       Gait Training                                       Modalities              to C/S area   Seated x 8 minutes  x10m

## 2024-04-28 LAB
APOB+LDLR+PCSK9 GENE MUT ANL BLD/T: NOT DETECTED
BRCA1+BRCA2 DEL+DUP + FULL MUT ANL BLD/T: NOT DETECTED
MLH1+MSH2+MSH6+PMS2 GN DEL+DUP+FUL M: NOT DETECTED

## 2024-04-30 ENCOUNTER — OFFICE VISIT (OUTPATIENT)
Dept: PHYSICAL THERAPY | Facility: CLINIC | Age: 34
End: 2024-04-30
Payer: COMMERCIAL

## 2024-04-30 DIAGNOSIS — M54.12 CERVICAL RADICULOPATHY: Primary | ICD-10-CM

## 2024-04-30 PROCEDURE — 97112 NEUROMUSCULAR REEDUCATION: CPT | Performed by: PHYSICAL THERAPIST

## 2024-04-30 NOTE — PROGRESS NOTES
Daily Note     Today's date: 2024  Patient name: Sandi Gaston  : 1990  MRN: [id number]  Referring provider: Salari, Behnam, DO  Dx:   Encounter Diagnosis     ICD-10-CM    1. Cervical radiculopathy  M54.12                      Subjective: Red[place] therapy is beneficial      Objective: See treatment diary below      Assessment: Tolerated treatment well. Patient demonstrated fatigue post treatment and exhibited good technique with therapeutic exercises      Plan: Continue per plan of care.      Precautions: Medical History    Diagnosis Date Comment Source   Anxiety disorder      Bipolar 1 disorder (HCC)      Diverticulitis      Hypertension      PTSD (post-traumatic stress disorder)            Manuals EVAL  2024            [facility] [place] - T/S regions (seated) [facility]   [facility]                                                 Neuro Re-Ed            Neurac supine pelvic lift   2x5 2x5         Nerac Bridge   2x5 2x5         Neurac SDLY hip ADD/ABD   2x5  2x5 2x5  2x5         Neurac scap retract supine   2x5 2x5         Neurac Cerv retract supine   2x5 2x5         Neurac scap retract w deression sit   2x5 2x5         Neurac T-spine Ext   2x5 2x5         Ther Ex             C/S AROM  5 x each as tolerated           Scap retractions   20x 5 sec hold against 1/2 roll           pulleys  20x           FIS   10x 3 sec hold              Seated shoulder ER w/ scap retractions  10x 5 sec hold                                                   Ther Activity                                       Gait Training                                       Modalities              to C/S area   Seated x 8 minutes  x10m

## 2024-05-02 ENCOUNTER — APPOINTMENT (OUTPATIENT)
Dept: PHYSICAL THERAPY | Facility: CLINIC | Age: 34
End: 2024-05-02
Payer: COMMERCIAL

## 2024-05-07 ENCOUNTER — APPOINTMENT (OUTPATIENT)
Dept: PHYSICAL THERAPY | Facility: CLINIC | Age: 34
End: 2024-05-07
Payer: COMMERCIAL

## 2024-05-09 ENCOUNTER — OFFICE VISIT (OUTPATIENT)
Dept: PHYSICAL THERAPY | Facility: CLINIC | Age: 34
End: 2024-05-09
Payer: COMMERCIAL

## 2024-05-09 DIAGNOSIS — M54.12 CERVICAL RADICULOPATHY: Primary | ICD-10-CM

## 2024-05-09 DIAGNOSIS — M54.16 LUMBAR RADICULOPATHY: ICD-10-CM

## 2024-05-09 PROCEDURE — 97112 NEUROMUSCULAR REEDUCATION: CPT | Performed by: PHYSICAL THERAPIST

## 2024-05-09 NOTE — PROGRESS NOTES
Daily Note     Today's date: 2024  Patient name: Sandi Gaston  : 1990  MRN: [id number]  Referring provider: Salari, Behnam, DO  Dx:   Encounter Diagnosis     ICD-10-CM    1. Cervical radiculopathy  M54.12       2. Lumbar radiculopathy  M54.16                      Subjective: I am getting relief with Red[place]      Objective: See treatment diary below      Assessment: Tolerated treatment well. Patient demonstrated fatigue post treatment and exhibited good technique with therapeutic exercises      Plan: Continue per plan of care.      Precautions: Medical History    Diagnosis Date Comment Source   Anxiety disorder      Bipolar 1 disorder (HCC)      Diverticulitis      Hypertension      PTSD (post-traumatic stress disorder)            Manuals EVAL  2024            [facility] [place] - T/S regions (seated) [facility]                                                 Neuro Re-Ed   9        Neurac supine pelvic lift   2x5 2x5 2x5        Nerac Bridge   2x5 2x5 2x5        Neurac SDLY hip ADD/ABD   2x5  2x5 2x5  2x5 2x5  2x5        Neurac scap retract supine   2x5 2x5 2x5        Neurac Cerv retract supine   2x5 2x5 2x5        Neurac scap retract w deression sit   2x5 2x5 2x5        Neurac T-spine Ext   2x5 2x5 2x5        Ther Ex             C/S AROM  5 x each as tolerated           Scap retractions   20x 5 sec hold against 1/2 roll           pulleys  20x           FIS   10x 3 sec hold              Seated shoulder ER w/ scap retractions  10x 5 sec hold                                                   Ther Activity                                       Gait Training                                       Modalities             MH to C/S area   Seated x 8 minutes  x10m

## 2024-05-16 ENCOUNTER — OFFICE VISIT (OUTPATIENT)
Dept: PHYSICAL THERAPY | Facility: CLINIC | Age: 34
End: 2024-05-16
Payer: COMMERCIAL

## 2024-05-16 DIAGNOSIS — M54.12 CERVICAL RADICULOPATHY: Primary | ICD-10-CM

## 2024-05-16 PROCEDURE — 97112 NEUROMUSCULAR REEDUCATION: CPT | Performed by: PHYSICAL THERAPIST

## 2024-05-16 NOTE — PROGRESS NOTES
PT Re-Evaluation     Today's date: 2024  Patient name: Sandi Gaston  : 1990  MRN: [id number]  Referring provider: Salari, Behnam, DO  Dx:   Encounter Diagnosis     ICD-10-CM    1. Cervical radiculopathy  M54.12                      Assessment  Impairments: abnormal or restricted ROM, impaired physical strength, pain with function, scapular dyskinesis and poor posture     Assessment details: Patient has shown temporary relief with [place] therapy, she continues with radiating symptoms.  She would benefit with continued PT due to lack of progress.    Goals  STG  Initiate HEP  Decrease pain by 50% in 3 weeks  Patient performing HEP 50% of time in 3 weeks  LTG  Independent with HEP  Decrease pain by 90% in 6 weeks  Patient performing HEP 90% of time in 6 weeks  FOTO >  60   in 6 weeks     Plan    Planned therapy interventions: neuromuscular re-education, postural training, patient/caregiver education, muscle pump exercises, gait training and therapeutic training  Speech planned therapy intervention: parent/caregiver coaching/training and patient/caregiver education      Subjective Evaluation    History of Present Illness  Mechanism of injury: Patient reports 1-2 days of relief after doing [place] Therapy.  She is to have injections next week due to ongoing pain.  Pain  Current pain ratin  At best pain ratin  At worst pain ratin  Location: Cervical spine  Quality: burning, needle-like, radiating, discomfort, knife-like and dull ache  Relieving factors: change in position  Aggravating factors: lifting, sitting and keyboarding      Objective     Static Posture     Comments  Neurac Testin/30 with R/B bungi required for lifts.    Flowsheet Rows      Flowsheet Row Most Recent Value   PT/OT G-Codes    Current Score 51   Projected Score 44   Assessment Type Re-evaluation               Precautions: Multiple HNP C-Spine      Manuals                                                                  Neuro Re-Ed 5/16            Neurac supine pelvic lift 2x5            Neurac Bridge 2x5            Neurac SDLY Hip ADD/ABD 2x5  2x5            Neurac cerv retract supine 2x5            Neurac scap retract supine 2x5            Neurac scap retract w depression sit 2x5                         Ther Ex                                                                                                                     Ther Activity                                       Gait Training                                       Modalities

## 2024-05-23 ENCOUNTER — APPOINTMENT (OUTPATIENT)
Dept: PHYSICAL THERAPY | Facility: CLINIC | Age: 34
End: 2024-05-23
Payer: COMMERCIAL

## 2024-08-29 ENCOUNTER — EVALUATION (OUTPATIENT)
Dept: PHYSICAL THERAPY | Facility: CLINIC | Age: 34
End: 2024-08-29
Payer: COMMERCIAL

## 2024-08-29 DIAGNOSIS — M54.12 CERVICAL RADICULITIS: Primary | ICD-10-CM

## 2024-08-29 DIAGNOSIS — M54.2 CERVICALGIA: ICD-10-CM

## 2024-08-29 PROCEDURE — 97161 PT EVAL LOW COMPLEX 20 MIN: CPT | Performed by: PHYSICAL THERAPIST

## 2024-08-29 NOTE — PROGRESS NOTES
PT Evaluation     Today's date: 2024  Patient name: Sandi Gaston  : 1990  MRN: [id number]  Referring provider: Salari, Behnam, DO  Dx:   Encounter Diagnosis     ICD-10-CM    1. Cervical radiculitis  M54.12       2. Cervicalgia  M54.2                      Assessment  Impairments: abnormal or restricted ROM, impaired physical strength, lacks appropriate home exercise program, pain with function and poor posture     Assessment details: Patient presents following a C4-5 disc replacement with limited ROM, weakness, postural dysfunction, activity non-tolerance and pain.  She does not tolerate prolonged static postures due to pain onset.  She would benefit with PT to restore prior level of function.      Goals  STG  Initiate HEP  Decrease pain by 50% in 3 weeks  Patient performing HEP 50% of time in 3 weeks  LTG  Independent with HEP  Decrease pain by 90% in 6 weeks  Patient performing HEP 90% of time in 6 weeks  FOTO >   40  in 6 weeks     Plan    Planned therapy interventions: manual therapy, neuromuscular re-education, postural training, strengthening, stretching, therapeutic exercise and home exercise program    Frequency: 3x week  Duration in weeks: 6  Treatment plan discussed with: patient    Subjective Evaluation    History of Present Illness  Mechanism of injury: Patient reports having a cervical disc replacement at C4-5, by Dr Ridley.  She wore a soft collar for 1 week.  She reports not tolerating lying supine due to neck pain.  She reports very limited ROM due to neck pain.  She reports constant neck pain radiating down to the shoulder blades.  She is limited in the ability to lift.          Recurrent probem    Quality of life: good    Patient Goals  Patient goals for therapy: decreased pain, improved balance, increased motion, increased strength, independence with ADLs/IADLs, return to sport/leisure activities and return to work    Pain  Current pain ratin  At best pain rating: 3  At worst  pain ratin  Location: mid-cervical  Quality: burning, dull ache, needle-like, radiating, discomfort and knife-like  Relieving factors: change in position  Aggravating factors: sitting and lifting (supine)    Treatments  Current treatment: physical therapy      Objective     Concurrent Complaints  Positive for night pain, disturbed sleep and headaches. Negative for dizziness, faints, nausea/motion sickness, tinnitus, trouble swallowing, respiratory pain, history of cancer and history of trauma    Postural Observations  Seated posture: poor  Standing posture: poor  Correction of posture: makes symptoms better      Active Range of Motion   Cervical/Thoracic Spine       Cervical    Flexion: 45 degrees  with pain  Extension: 10 degrees     with pain  Left lateral flexion: 20 degrees     with pain  Right lateral flexion: 30 degrees     with pain  Left rotation: 50 degrees  Right rotation: 60 degrees    with pain    Strength/Myotome Testing   Cervical Spine     Left   Normal strength    Right   Normal strength  Neuro Exam:     Headaches   Patient reports headaches: Yes.              Precautions: Medical History    Diagnosis Date Comment Source   Anxiety disorder      Bipolar 1 disorder (HCC)      Diverticulitis      Hypertension      PTSD (post-traumatic stress disorder)            Manuals                                                                 Neuro Re-Ed                                                                                                        Ther Ex                                                                                                                     Ther Activity                                       Gait Training                                       Modalities

## 2024-08-29 NOTE — LETTER
2024    Behnam Salari, DO  [facility]  [address] [place] [address]    Patient: Sandi Gaston   YOB: 1990   Date of Visit: 2024     Encounter Diagnosis     ICD-10-CM    1. Cervical radiculitis  M54.12       2. Cervicalgia  M54.2           Dear Dr. Ridley:    Thank you for your recent referral of Sandi Gaston. Please review the attached evaluation summary from Sandi's recent visit.     Please verify that you agree with the plan of care by signing the attached order.     If you have any questions or concerns, please do not hesitate to call.     I sincerely appreciate the opportunity to share in the care of one of your patients and hope to have another opportunity to work with you in the near future.       Sincerely,    Tobias Reyes, PT      Referring Provider:      I certify that I have read the below Plan of Care and certify the need for these services furnished under this plan of treatment while under my care.                    Behnam Salari, DO  [address]  Via Fax: [phone]          PT Evaluation     Today's date: 2024  Patient name: Sandi Gaston  : 1990  MRN: [id number]  Referring provider: Salari, Behnam, DO  Dx:   Encounter Diagnosis     ICD-10-CM    1. Cervical radiculitis  M54.12       2. Cervicalgia  M54.2                      Assessment  Impairments: abnormal or restricted ROM, impaired physical strength, lacks appropriate home exercise program, pain with function and poor posture     Assessment details: Patient presents following a C4-5 disc replacement with limited ROM, weakness, postural dysfunction, activity non-tolerance and pain.  She does not tolerate prolonged static postures due to pain onset.  She would benefit with PT to restore prior level of function.      Goals  STG  Initiate HEP  Decrease pain by 50% in 3 weeks  Patient performing HEP 50% of time in 3 weeks  LTG  Independent with  HEP  Decrease pain by 90% in 6 weeks  Patient performing HEP 90% of time in 6 weeks  FOTO >   40  in 6 weeks     Plan    Planned therapy interventions: manual therapy, neuromuscular re-education, postural training, strengthening, stretching, therapeutic exercise and home exercise program    Frequency: 3x week  Duration in weeks: 6  Treatment plan discussed with: patient    Subjective Evaluation    History of Present Illness  Mechanism of injury: Patient reports having a cervical disc replacement at C4-5, by Dr Ridley.  She wore a soft collar for 1 week.  She reports not tolerating lying supine due to neck pain.  She reports very limited ROM due to neck pain.  She reports constant neck pain radiating down to the shoulder blades.  She is limited in the ability to lift.          Recurrent probem    Quality of life: good    Patient Goals  Patient goals for therapy: decreased pain, improved balance, increased motion, increased strength, independence with ADLs/IADLs, return to sport/leisure activities and return to work    Pain  Current pain ratin  At best pain rating: 3  At worst pain ratin  Location: mid-cervical  Quality: burning, dull ache, needle-like, radiating, discomfort and knife-like  Relieving factors: change in position  Aggravating factors: sitting and lifting (supine)    Treatments  Current treatment: physical therapy      Objective     Concurrent Complaints  Positive for night pain, disturbed sleep and headaches. Negative for dizziness, faints, nausea/motion sickness, tinnitus, trouble swallowing, respiratory pain, history of cancer and history of trauma    Postural Observations  Seated posture: poor  Standing posture: poor  Correction of posture: makes symptoms better      Active Range of Motion   Cervical/Thoracic Spine       Cervical    Flexion: 45 degrees  with pain  Extension: 10 degrees     with pain  Left lateral flexion: 20 degrees     with pain  Right lateral flexion: 30 degrees     with  pain  Left rotation: 50 degrees  Right rotation: 60 degrees    with pain    Strength/Myotome Testing   Cervical Spine     Left   Normal strength    Right   Normal strength  Neuro Exam:     Headaches   Patient reports headaches: Yes.              Precautions: Medical History    Diagnosis Date Comment Source   Anxiety disorder      Bipolar 1 disorder (HCC)      Diverticulitis      Hypertension      PTSD (post-traumatic stress disorder)            Manuals                                                                 Neuro Re-Ed                                                                                                        Ther Ex                                                                                                                     Ther Activity                                       Gait Training                                       Modalities

## 2024-09-03 ENCOUNTER — OFFICE VISIT (OUTPATIENT)
Dept: PHYSICAL THERAPY | Facility: CLINIC | Age: 34
End: 2024-09-03
Payer: COMMERCIAL

## 2024-09-03 DIAGNOSIS — M54.2 CERVICALGIA: ICD-10-CM

## 2024-09-03 DIAGNOSIS — M54.12 CERVICAL RADICULITIS: Primary | ICD-10-CM

## 2024-09-03 PROCEDURE — 97112 NEUROMUSCULAR REEDUCATION: CPT | Performed by: PHYSICAL THERAPIST

## 2024-09-03 PROCEDURE — 97110 THERAPEUTIC EXERCISES: CPT | Performed by: PHYSICAL THERAPIST

## 2024-09-03 NOTE — PROGRESS NOTES
"Daily Note     Today's date: 9/3/2024  Patient name: Sandi Gaston  : 1990  MRN: [id number]  Referring provider: Salari, Behnam, DO  Dx:   Encounter Diagnosis     ICD-10-CM    1. Cervical radiculitis  M54.12       2. Cervicalgia  M54.2                      Subjective: I have a hard time correcting my posture due to pain.      Objective: See treatment diary below  Postural self-correction emphasis on sternal elevation.    Assessment: Tolerated treatment well. Patient demonstrated fatigue post treatment and exhibited good technique with therapeutic exercises      Plan: Continue per plan of care.      Precautions: Medical History    Diagnosis Date Comment Source   Anxiety disorder      Bipolar 1 disorder (HCC)      Diverticulitis      Hypertension      PTSD (post-traumatic stress disorder)            Manuals                                                                   Neuro Re-Ed 9/3            Postural self-correction  Emph  Sternal elevation            STS emph on sternal elevation 20x  20\"                                                                             Ther Ex             [place] Mid and lower trap #15 2x20            TB ER neutral YTB 20x            TB scap retract w depression YTB 20x            TB cerv F/E resistance YTB 2x10                                                                Ther Activity                                       Gait Training                                       Modalities                                            "

## 2024-09-04 ENCOUNTER — OFFICE VISIT (OUTPATIENT)
Dept: PHYSICAL THERAPY | Facility: CLINIC | Age: 34
End: 2024-09-04
Payer: COMMERCIAL

## 2024-09-04 DIAGNOSIS — M54.12 CERVICAL RADICULITIS: Primary | ICD-10-CM

## 2024-09-04 DIAGNOSIS — M54.2 CERVICALGIA: ICD-10-CM

## 2024-09-04 PROCEDURE — 97140 MANUAL THERAPY 1/> REGIONS: CPT

## 2024-09-04 PROCEDURE — 97112 NEUROMUSCULAR REEDUCATION: CPT

## 2024-09-04 PROCEDURE — 97110 THERAPEUTIC EXERCISES: CPT

## 2024-09-04 NOTE — PROGRESS NOTES
"Daily Note     Today's date: 2024  Patient name: Sandi Gaston  : 1990  MRN: [id number]  Referring provider: Salari, Behnam, DO  Dx:   Encounter Diagnosis     ICD-10-CM    1. Cervical radiculitis  M54.12       2. Cervicalgia  M54.2                      Subjective: Patient reports continued neck pain & tightness.       Objective: See treatment diary below      Assessment: Tolerated treatment fair. Patient demonstrated fatigue post treatment, exhibited good technique with therapeutic exercises, and would benefit from continued PT      Plan: Continue per plan of care.      Precautions: Medical History    Diagnosis Date Comment Source   Anxiety disorder      Bipolar 1 disorder (HCC)      Diverticulitis      Hypertension      PTSD (post-traumatic stress disorder)            Manuals  2024                          C/S STM (seated)                                        Neuro Re-Ed 9/3            Postural self-correction  Emph  Sternal elevation ---           STS emph on sternal elevation 20x  20\" --                        Seated ER w/ scap retractions  20x 5 sec hold                                                   Ther Ex             [place] Mid and lower trap #15 2x20 ---           TB ER neutral YTB 20x ---           TB scap retract w depression YTB 20x ----           TB cerv F/E resistance YTB 2x10 ---           FIS w/ blue PB  10x 5 sec hold            pulleys  2 minutes           Row / ext / IR  20x YTB (seated)                         Ther Activity                                       Gait Training                                       Modalities               MH to c/s area x 8 minutes (seated)                                "

## 2024-09-06 ENCOUNTER — OFFICE VISIT (OUTPATIENT)
Dept: PHYSICAL THERAPY | Facility: CLINIC | Age: 34
End: 2024-09-06
Payer: COMMERCIAL

## 2024-09-06 DIAGNOSIS — M54.12 CERVICAL RADICULITIS: Primary | ICD-10-CM

## 2024-09-06 DIAGNOSIS — M54.2 CERVICALGIA: ICD-10-CM

## 2024-09-06 PROCEDURE — 97110 THERAPEUTIC EXERCISES: CPT

## 2024-09-06 PROCEDURE — 97140 MANUAL THERAPY 1/> REGIONS: CPT

## 2024-09-06 PROCEDURE — 97112 NEUROMUSCULAR REEDUCATION: CPT

## 2024-09-06 NOTE — PROGRESS NOTES
"Daily Note     Today's date: 2024  Patient name: Sandi Gaston  : 1990  MRN: [id number]  Referring provider: Salari, Behnam, DO  Dx:   Encounter Diagnosis     ICD-10-CM    1. Cervical radiculitis  M54.12       2. Cervicalgia  M54.2           Start Time: 0845          Subjective: Yesterday my neck was sore, I think from therapy 2 days in a row. Today my neck feels better.       Objective: See treatment diary below      Assessment: Tolerated treatment fair. Patient demonstrated fatigue post treatment, exhibited good technique with therapeutic exercises, and would benefit from continued PT      Plan: Progress treatment as tolerated.       Precautions: Medical History    Diagnosis Date Comment Source   Anxiety disorder      Bipolar 1 disorder (HCC)      Diverticulitis      Hypertension      PTSD (post-traumatic stress disorder)            Manuals  2024                         C/S STM (seated)  C/S STM (seated)                                       Neuro Re-Ed 9/3            Postural self-correction  Emph  Sternal elevation ---           STS emph on sternal elevation 20x  20\" --                        Seated ER w/ scap retractions  20x 5 sec hold  20x 5 sec hold                                                 Ther Ex             [place] Mid and lower trap #15 2x20 ---           TB ER neutral YTB 20x ---           TB scap retract w depression YTB 20x ----           TB cerv F/E resistance YTB 2x10 ---           FIS w/ blue PB  10x 5 sec hold  2 min          pulleys  2 minutes 2 min          Row / ext / IR  20x YTB (seated)  20x YTB (seated)          Standing shld flex holding red ball   20x          Ther Activity                                       Gait Training                                       Modalities               MH to c/s area x 8 minutes (seated)  MH to c/s area x 8 minutes (seated)                                 "

## 2024-09-09 ENCOUNTER — OFFICE VISIT (OUTPATIENT)
Dept: PHYSICAL THERAPY | Facility: CLINIC | Age: 34
End: 2024-09-09
Payer: COMMERCIAL

## 2024-09-09 DIAGNOSIS — M54.2 CERVICALGIA: ICD-10-CM

## 2024-09-09 DIAGNOSIS — M54.12 CERVICAL RADICULITIS: Primary | ICD-10-CM

## 2024-09-09 PROCEDURE — 97110 THERAPEUTIC EXERCISES: CPT

## 2024-09-09 PROCEDURE — 97140 MANUAL THERAPY 1/> REGIONS: CPT

## 2024-09-09 NOTE — PROGRESS NOTES
"Daily Note     Today's date: 2024  Patient name: Sandi Gaston  : 1990  MRN: [id number]  Referring provider: Salari, Behnam, DO  Dx:   Encounter Diagnosis     ICD-10-CM    1. Cervical radiculitis  M54.12       2. Cervicalgia  M54.2                      Subjective: Patient arrives ambulating R LE NWB using (2) crutches. I fell outside yesterday & sprained my R ankle. I went to the ER last night & now I have to follow up with an orthopedic Dr. My neck just feels sore now, but I didn't mess up the surgery.      Objective: See treatment diary below      Assessment: Tolerated treatment fair. Patient demonstrated fatigue post treatment, exhibited good technique with therapeutic exercises, and would benefit from continued PT      Plan: Progress treatment as tolerated.       Precautions: Medical History    Diagnosis Date Comment Source   Anxiety disorder      Bipolar 1 disorder (HCC)      Diverticulitis      Hypertension      PTSD (post-traumatic stress disorder)            Manuals  2024                        C/S STM (seated)  C/S STM (seated)  C/S STM (seated)                                    Neuro Re-Ed 9/3            Postural self-correction  Emph  Sternal elevation ---           STS emph on sternal elevation 20x  20\" --                        Seated ER w/ scap retractions  20x 5 sec hold  20x 5 sec hold 20x 5 sec hold                                                Ther Ex             [place] Mid and lower trap #15 2x20 ---           TB ER neutral YTB 20x ---           TB scap retract w depression YTB 20x ----           TB cerv F/E resistance YTB 2x10 ---           FIS w/ blue PB  10x 5 sec hold  2 min 2 min         pulleys  2 minutes 2 min (Occupied)         Row / ext / IR  20x YTB (seated)  20x YTB (seated) 20x YTB (seated)         Standing shld flex holding red ball   20x 20x seated          Ther Activity                                       Gait Training                       "                 Modalities               MH to c/s area x 8 minutes (seated)  MH to c/s area x 8 minutes (seated)  MH to c/s area x 8 minutes (seated)

## 2024-09-11 ENCOUNTER — OFFICE VISIT (OUTPATIENT)
Dept: PHYSICAL THERAPY | Facility: CLINIC | Age: 34
End: 2024-09-11
Payer: COMMERCIAL

## 2024-09-11 DIAGNOSIS — M54.12 CERVICAL RADICULITIS: Primary | ICD-10-CM

## 2024-09-11 DIAGNOSIS — M54.2 CERVICALGIA: ICD-10-CM

## 2024-09-11 PROCEDURE — 97112 NEUROMUSCULAR REEDUCATION: CPT

## 2024-09-11 PROCEDURE — 97110 THERAPEUTIC EXERCISES: CPT

## 2024-09-11 PROCEDURE — 97140 MANUAL THERAPY 1/> REGIONS: CPT

## 2024-09-11 NOTE — PROGRESS NOTES
"Daily Note     Today's date: 2024  Patient name: Sandi Gaston  : 1990  MRN: [id number]  Referring provider: Salari, Behnam, DO  Dx:   Encounter Diagnosis     ICD-10-CM    1. Cervical radiculitis  M54.12       2. Cervicalgia  M54.2           Start Time: 1140  Stop Time: 1230  Total time in clinic (min): 50 minutes    Subjective: The neck incision is \"itchy\", but overall my neck is feeling better.      Objective: See treatment diary below      Assessment: Tolerated treatment fair. Patient demonstrated fatigue post treatment, exhibited good technique with therapeutic exercises, and would benefit from continued PT  Patient arrives ambulating R LE NWB using (2) crutches    Plan: Progress treatment as tolerated.       Precautions: Medical History    Diagnosis Date Comment Source   Anxiety disorder      Bipolar 1 disorder (HCC)      Diverticulitis      Hypertension      PTSD (post-traumatic stress disorder)            Manuals  2024                       C/S STM (seated)  C/S STM (seated)  C/S STM (seated)  C/S STM (seated)                                   Neuro Re-Ed 9/3            Postural self-correction  Emph  Sternal elevation ---           STS emph on sternal elevation 20x  20\" --                        Seated ER w/ scap retractions  20x 5 sec hold  20x 5 sec hold 20x 5 sec hold 20x 5 sec hold                                               Ther Ex             [place] Mid and lower trap #15 2x20 ---           TB ER neutral YTB 20x ---           TB scap retract w depression YTB 20x ----           TB cerv F/E resistance YTB 2x10 ---           FIS w/ blue PB  10x 5 sec hold  2 min 2 min 2 min        pulleys  2 minutes 2 min (Occupied) 2 min        Row / ext / IR  20x YTB (seated)  20x YTB (seated) 20x YTB (seated) 20x YTB (seated)        Standing shld flex holding red ball   20x 20x seated  20x seated        Ther Activity                                       Gait Training "                                       Modalities               MH to c/s area x 8 minutes (seated)  MH to c/s area x 8 minutes (seated)  MH to c/s area x 8 minutes (seated) MH to c/s area x 8 minutes (seated)

## 2024-09-13 ENCOUNTER — OFFICE VISIT (OUTPATIENT)
Dept: PHYSICAL THERAPY | Facility: CLINIC | Age: 34
End: 2024-09-13
Payer: COMMERCIAL

## 2024-09-13 DIAGNOSIS — M54.12 CERVICAL RADICULITIS: Primary | ICD-10-CM

## 2024-09-13 DIAGNOSIS — M54.2 CERVICALGIA: ICD-10-CM

## 2024-09-13 PROCEDURE — 97140 MANUAL THERAPY 1/> REGIONS: CPT

## 2024-09-13 PROCEDURE — 97112 NEUROMUSCULAR REEDUCATION: CPT

## 2024-09-13 PROCEDURE — 97110 THERAPEUTIC EXERCISES: CPT

## 2024-09-13 NOTE — PROGRESS NOTES
"Daily Note     Today's date: 2024  Patient name: Sandi Gaston  : 1990  MRN: [id number]  Referring provider: Salari, Behnam, DO  Dx:   Encounter Diagnosis     ICD-10-CM    1. Cervical radiculitis  M54.12       2. Cervicalgia  M54.2           Start Time: 1140          Subjective: \"I can't do much or else my neck hurts.\" I still can't lye down flat in bed. 5/10 current neck pain.     Objective: See treatment diary below      Assessment: Tolerated treatment fair. Patient demonstrated fatigue post treatment, exhibited good technique with therapeutic exercises, and would benefit from continued PT  Patient arrives ambulating R LE WBAT.     Plan: Progress note during next visit.      Precautions: Medical History    Diagnosis Date Comment Source   Anxiety disorder      Bipolar 1 disorder (HCC)      Diverticulitis      Hypertension            PTSD (post-traumatic stress disorder)            Manuals  2024                       C/S STM (seated)  C/S STM (seated)  C/S STM (seated)  C/S STM (seated)                                   Neuro Re-Ed 9/3            Postural self-correction  Emph  Sternal elevation ---           STS emph on sternal elevation 20x  20\" --                        Seated ER w/ scap retractions  20x 5 sec hold  20x 5 sec hold 20x 5 sec hold 20x 5 sec hold                                               Ther Ex             [place] Mid and lower trap #15 2x20 ---           TB ER neutral YTB 20x ---           TB scap retract w depression YTB 20x ----           TB cerv F/E resistance YTB 2x10 ---           FIS w/ blue PB  10x 5 sec hold  2 min 2 min 2 min        pulleys  2 minutes 2 min (Occupied) 2 min        Row / ext / IR  20x YTB (seated)  20x YTB (seated) 20x YTB (seated) 20x YTB (seated)        Standing shld flex holding red ball   20x 20x seated  20x seated        Ther Activity                                       Gait Training                                "        Modalities               MH to c/s area x 8 minutes (seated)  MH to c/s area x 8 minutes (seated)  MH to c/s area x 8 minutes (seated) MH to c/s area x 8 minutes (seated)

## 2024-09-16 ENCOUNTER — EVALUATION (OUTPATIENT)
Dept: PHYSICAL THERAPY | Facility: CLINIC | Age: 34
End: 2024-09-16
Payer: COMMERCIAL

## 2024-09-16 DIAGNOSIS — M54.12 CERVICAL RADICULITIS: Primary | ICD-10-CM

## 2024-09-16 PROCEDURE — 97112 NEUROMUSCULAR REEDUCATION: CPT | Performed by: PHYSICAL THERAPIST

## 2024-09-16 NOTE — PROGRESS NOTES
PT Re-Evaluation     Today's date: 2024  Patient name: Sandi Gaston  : 1990  MRN: [id number]  Referring provider: Salari, Behnam, DO  Dx:   Encounter Diagnosis     ICD-10-CM    1. Cervical radiculitis  M54.12                      Assessment  Impairments: abnormal or restricted ROM, activity intolerance, impaired physical strength and pain with function    Assessment details: Patient is showing steady progress with decreased pain intensity, decreased pain frequency and improved ROM.  She would benefit with continued PT to restore prior level of function.    Goals  STG  Initiate HEP  Decrease pain by 50% in 3 weeks  Patient performing HEP 50% of time in 3 weeks  LTG  Independent with HEP  Decrease pain by 90% in 6 weeks  Patient performing HEP 90% of time in 6 weeks  FOTO >  65   in 6 weeks     Plan    Planned therapy interventions: manual therapy, neuromuscular re-education, strengthening, stretching and therapeutic exercise    Frequency: 2x week  Duration in weeks: 6    Subjective Evaluation    History of Present Illness  Mechanism of injury: Patient reports less intense pain when standing, but still a lot of pain in supine.  The headaches are no longer daily, they have decreased to three times per week.          Recurrent probem    Quality of life: good    Patient Goals  Patient goals for therapy: decreased pain, increased motion, increased strength, independence with ADLs/IADLs and return to sport/leisure activities    Pain  Current pain rating: 3  At best pain ratin  At worst pain ratin  Quality: dull ache and discomfort  Relieving factors: change in position  Aggravating factors: lifting (supine)  Progression: improved    Treatments  Current treatment: physical therapy    Objective     Active Range of Motion   Cervical/Thoracic Spine       Cervical    Flexion: 50 degrees   Extension: 25 degrees      Left rotation: 75 degrees  Right rotation: 75 degrees     Flowsheet Rows      Flowsheet  Row Most Recent Value   PT/OT G-Codes    Current Score 60   Projected Score 38   Assessment Type Re-evaluation               Precautions: C4-5 discectomy      Manuals 9/16            STM MV                                                   Neuro Re-Ed 9/16            Neurac supine pelvic lift 2x5            Neurac cerv retract supine 2x5            Neurac cerv retract SDLY             Neurac scap retract supine 2x5            Neurac scap retract w depression sit                                       Ther Ex                                                                                                                     Ther Activity                                       Gait Training                                       Modalities

## 2024-09-18 ENCOUNTER — OFFICE VISIT (OUTPATIENT)
Dept: PHYSICAL THERAPY | Facility: CLINIC | Age: 34
End: 2024-09-18
Payer: COMMERCIAL

## 2024-09-18 DIAGNOSIS — M54.12 CERVICAL RADICULITIS: Primary | ICD-10-CM

## 2024-09-18 DIAGNOSIS — M54.2 CERVICALGIA: ICD-10-CM

## 2024-09-18 PROCEDURE — 97110 THERAPEUTIC EXERCISES: CPT

## 2024-09-18 PROCEDURE — 97112 NEUROMUSCULAR REEDUCATION: CPT

## 2024-09-18 PROCEDURE — 97140 MANUAL THERAPY 1/> REGIONS: CPT

## 2024-09-18 NOTE — PROGRESS NOTES
"Daily Note     Today's date: 2024  Patient name: Sandi Gaston  : 1990  MRN: [id number]  Referring provider: Salari, Behnam, DO  Dx:   Encounter Diagnosis     ICD-10-CM    1. Cervical radiculitis  M54.12       2. Cervicalgia  M54.2           Start Time: 1145          Subjective: My neck is very stiff today. I had a hard time sleeping last night, I couldn't get comfortable. The new Gabapentin is giving me nightmares.       Objective: See treatment diary below    Manuals  2024                      C/S STM (seated)  C/S STM (seated)  C/S STM (seated)  C/S STM (seated)  C/S STM (seated)                                  Neuro Re-Ed 9/3            Postural self-correction  Emph  Sternal elevation ---           STS emph on sternal elevation 20x  20\" --                        Seated ER w/ scap retractions  20x 5 sec hold  20x 5 sec hold 20x 5 sec hold 20x 5 sec hold 20x 5 sec hold w/ YTB                                              Ther Ex             [place] Mid and lower trap #15 2x20 ---           TB ER neutral YTB 20x ---           TB scap retract w depression YTB 20x ----           TB cerv F/E resistance YTB 2x10 ---           FIS w/ blue PB  10x 5 sec hold  2 min 2 min 2 min 2 min       pulleys  2 minutes 2 min (Occupied) 2 min 2 min       Row / ext / IR  20x YTB (seated)  20x YTB (seated) 20x YTB (seated) 20x YTB (seated) 20x YTB (seated)       Standing shld flex holding red ball   20x 20x seated  20x seated 20x (seated)       Ther Activity                                       Gait Training                                       Modalities               MH to c/s area x 8 minutes (seated)  MH to c/s area x 8 minutes (seated)  MH to c/s area x 8 minutes (seated) MH to c/s area x 8 minutes (seated) MH to c/s area x 8 minutes (seated)                        Assessment: Tolerated treatment fair. Patient demonstrated fatigue post treatment, exhibited good " technique with therapeutic exercises, and would benefit from continued PT      Plan: Continue per plan of care.      Precautions: C4-5 discectomy      Manuals 9/16            STM MV                                                   Neuro Re-Ed 9/16            Neurac supine pelvic lift 2x5            Neurac cerv retract supine 2x5            Neurac cerv retract SDLY             Neurac scap retract supine 2x5            Neurac scap retract w depression sit                                       Ther Ex                                                                                                                       Ther Activity                                       Gait Training                                       Modalities

## 2024-09-20 ENCOUNTER — OFFICE VISIT (OUTPATIENT)
Dept: PHYSICAL THERAPY | Facility: CLINIC | Age: 34
End: 2024-09-20
Payer: COMMERCIAL

## 2024-09-20 DIAGNOSIS — M54.2 CERVICALGIA: ICD-10-CM

## 2024-09-20 DIAGNOSIS — M54.12 CERVICAL RADICULITIS: Primary | ICD-10-CM

## 2024-09-20 PROCEDURE — 97112 NEUROMUSCULAR REEDUCATION: CPT

## 2024-09-20 PROCEDURE — 97110 THERAPEUTIC EXERCISES: CPT

## 2024-09-20 PROCEDURE — 97140 MANUAL THERAPY 1/> REGIONS: CPT

## 2024-09-20 NOTE — PROGRESS NOTES
"Daily Note     Today's date: 2024  Patient name: Sandi Gaston  : 1990  MRN: [id number]  Referring provider: Salari, Behnam, DO  Dx:   Encounter Diagnosis     ICD-10-CM    1. Cervical radiculitis  M54.12       2. Cervicalgia  M54.2           Start Time: 1123          Subjective: My neck is feeling looser. It's still painful to look up & lye down flat on my back.       Objective: See treatment diary below      Manuals  2024                      C/S STM (seated)  C/S STM (seated)  C/S STM (seated)  C/S STM (seated)  C/S STM (seated)                                  Neuro Re-Ed 9/3            Postural self-correction  Emph  Sternal elevation ---           STS emph on sternal elevation 20x  20\" --                        Seated ER w/ scap retractions  20x 5 sec hold  20x 5 sec hold 20x 5 sec hold 20x 5 sec hold 20x 5 sec hold w/ YTB                                       T/S extension 10 x 5 sec hold (seated)       Ther Ex             Sissy Mid and lower trap #15 2x20 ---           TB ER neutral YTB 20x ---           TB scap retract w depression YTB 20x ----           TB cerv F/E resistance YTB 2x10 ---           FIS w/ blue PB  10x 5 sec hold  2 min 2 min 2 min 2 min       pulleys  2 minutes 2 min (Occupied) 2 min 2 min       Row / ext / IR  20x YTB (seated)  20x YTB (seated) 20x YTB (seated) 20x YTB (seated) 20x YTB (seated)       Standing shld flex holding red ball   20x 20x seated  20x seated 20x (seated)       Ther Activity                                       Gait Training                                       Modalities               MH to c/s area x 8 minutes (seated)  MH to c/s area x 8 minutes (seated)  MH to c/s area x 8 minutes (seated) MH to c/s area x 8 minutes (seated) MH to c/s area x 8 minutes (seated)                        Assessment: Tolerated treatment fair. Patient demonstrated fatigue post treatment, exhibited good technique with " therapeutic exercises, and would benefit from continued PT      Plan: Continue per plan of care.      Precautions: C4-5 discectomy      Manuals 9/16            STM MV                                                   Neuro Re-Ed 9/16            Neurac supine pelvic lift 2x5            Neurac cerv retract supine 2x5            Neurac cerv retract SDLY             Neurac scap retract supine 2x5            Neurac scap retract w depression sit                                       Ther Ex                                                                                                                       Ther Activity                                       Gait Training                                       Modalities

## 2024-09-22 ENCOUNTER — HOSPITAL ENCOUNTER (INPATIENT)
Facility: HOSPITAL | Age: 34
LOS: 1 days | Discharge: HOME | DRG: 103 | End: 2024-09-23
Attending: EMERGENCY MEDICINE
Payer: COMMERCIAL

## 2024-09-22 ENCOUNTER — APPOINTMENT (EMERGENCY)
Dept: RADIOLOGY | Facility: HOSPITAL | Age: 34
DRG: 103 | End: 2024-09-22
Payer: COMMERCIAL

## 2024-09-22 DIAGNOSIS — Z72.0 TOBACCO ABUSE: Chronic | ICD-10-CM

## 2024-09-22 DIAGNOSIS — R51.9 HEADACHE: ICD-10-CM

## 2024-09-22 DIAGNOSIS — R29.810 FACIAL DROOP: Primary | ICD-10-CM

## 2024-09-22 LAB
ALBUMIN SERPL BCG-MCNC: 4.7 G/DL (ref 3.5–5)
ALP SERPL-CCNC: 64 U/L (ref 34–104)
ALT SERPL W P-5'-P-CCNC: 22 U/L (ref 7–52)
ANION GAP SERPL CALCULATED.3IONS-SCNC: 11 MMOL/L (ref 4–13)
APTT PPP: 27 SECONDS (ref 23–34)
AST SERPL W P-5'-P-CCNC: 27 U/L (ref 13–39)
BASOPHILS # BLD AUTO: 0.03 THOUSANDS/ΜL (ref 0–0.1)
BASOPHILS NFR BLD AUTO: 0 % (ref 0–1)
BILIRUB SERPL-MCNC: 0.5 MG/DL (ref 0.2–1)
BUN SERPL-MCNC: 2 MG/DL (ref 5–25)
CALCIUM SERPL-MCNC: 9.1 MG/DL (ref 8.4–10.2)
CHLORIDE SERPL-SCNC: 102 MMOL/L (ref 96–108)
CO2 SERPL-SCNC: 24 MMOL/L (ref 21–32)
CREAT SERPL-MCNC: 0.8 MG/DL (ref 0.6–1.3)
EOSINOPHIL # BLD AUTO: 0.08 THOUSAND/ΜL (ref 0–0.61)
EOSINOPHIL NFR BLD AUTO: 1 % (ref 0–6)
ERYTHROCYTE [DISTWIDTH] IN BLOOD BY AUTOMATED COUNT: 12.2 % (ref 11.6–15.1)
GFR SERPL CREATININE-BSD FRML MDRD: 97 ML/MIN/1.73SQ M
GLUCOSE SERPL-MCNC: 103 MG/DL (ref 65–140)
GLUCOSE SERPL-MCNC: 120 MG/DL (ref 65–140)
HCT VFR BLD AUTO: 44.8 % (ref 34.8–46.1)
HGB BLD-MCNC: 15.4 G/DL (ref 11.5–15.4)
IMM GRANULOCYTES # BLD AUTO: 0.02 THOUSAND/UL (ref 0–0.2)
IMM GRANULOCYTES NFR BLD AUTO: 0 % (ref 0–2)
INR PPP: 1.06 (ref 0.85–1.19)
LYMPHOCYTES # BLD AUTO: 2.26 THOUSANDS/ΜL (ref 0.6–4.47)
LYMPHOCYTES NFR BLD AUTO: 22 % (ref 14–44)
MCH RBC QN AUTO: 31.2 PG (ref 26.8–34.3)
MCHC RBC AUTO-ENTMCNC: 34.4 G/DL (ref 31.4–37.4)
MCV RBC AUTO: 91 FL (ref 82–98)
MONOCYTES # BLD AUTO: 0.65 THOUSAND/ΜL (ref 0.17–1.22)
MONOCYTES NFR BLD AUTO: 6 % (ref 4–12)
NEUTROPHILS # BLD AUTO: 7.09 THOUSANDS/ΜL (ref 1.85–7.62)
NEUTS SEG NFR BLD AUTO: 71 % (ref 43–75)
NRBC BLD AUTO-RTO: 0 /100 WBCS
PLATELET # BLD AUTO: 369 THOUSANDS/UL (ref 149–390)
PMV BLD AUTO: 10 FL (ref 8.9–12.7)
POTASSIUM SERPL-SCNC: 3.4 MMOL/L (ref 3.5–5.3)
PROT SERPL-MCNC: 7 G/DL (ref 6.4–8.4)
PROTHROMBIN TIME: 14.3 SECONDS (ref 12.3–15)
RBC # BLD AUTO: 4.94 MILLION/UL (ref 3.81–5.12)
SODIUM SERPL-SCNC: 137 MMOL/L (ref 135–147)
WBC # BLD AUTO: 10.13 THOUSAND/UL (ref 4.31–10.16)

## 2024-09-22 PROCEDURE — 70496 CT ANGIOGRAPHY HEAD: CPT

## 2024-09-22 PROCEDURE — 36415 COLL VENOUS BLD VENIPUNCTURE: CPT | Performed by: EMERGENCY MEDICINE

## 2024-09-22 PROCEDURE — 70498 CT ANGIOGRAPHY NECK: CPT

## 2024-09-22 PROCEDURE — 85610 PROTHROMBIN TIME: CPT | Performed by: EMERGENCY MEDICINE

## 2024-09-22 PROCEDURE — 85025 COMPLETE CBC W/AUTO DIFF WBC: CPT | Performed by: EMERGENCY MEDICINE

## 2024-09-22 PROCEDURE — 82948 REAGENT STRIP/BLOOD GLUCOSE: CPT

## 2024-09-22 PROCEDURE — 85730 THROMBOPLASTIN TIME PARTIAL: CPT | Performed by: EMERGENCY MEDICINE

## 2024-09-22 PROCEDURE — 80053 COMPREHEN METABOLIC PANEL: CPT | Performed by: EMERGENCY MEDICINE

## 2024-09-22 PROCEDURE — 99285 EMERGENCY DEPT VISIT HI MDM: CPT

## 2024-09-22 PROCEDURE — 93005 ELECTROCARDIOGRAM TRACING: CPT

## 2024-09-22 PROCEDURE — 99285 EMERGENCY DEPT VISIT HI MDM: CPT | Performed by: EMERGENCY MEDICINE

## 2024-09-22 RX ORDER — ASPIRIN 325 MG
325 TABLET ORAL ONCE
Status: COMPLETED | OUTPATIENT
Start: 2024-09-22 | End: 2024-09-22

## 2024-09-22 RX ADMIN — ASPIRIN 325 MG: 325 TABLET ORAL at 21:31

## 2024-09-22 RX ADMIN — IOHEXOL 100 ML: 350 INJECTION, SOLUTION INTRAVENOUS at 22:59

## 2024-09-23 ENCOUNTER — APPOINTMENT (INPATIENT)
Dept: NON INVASIVE DIAGNOSTICS | Facility: HOSPITAL | Age: 34
DRG: 103 | End: 2024-09-23
Payer: COMMERCIAL

## 2024-09-23 ENCOUNTER — APPOINTMENT (INPATIENT)
Dept: RADIOLOGY | Facility: HOSPITAL | Age: 34
DRG: 103 | End: 2024-09-23
Payer: COMMERCIAL

## 2024-09-23 VITALS
BODY MASS INDEX: 30.66 KG/M2 | WEIGHT: 184 LBS | RESPIRATION RATE: 18 BRPM | TEMPERATURE: 97.8 F | HEART RATE: 78 BPM | SYSTOLIC BLOOD PRESSURE: 113 MMHG | HEIGHT: 65 IN | OXYGEN SATURATION: 99 % | DIASTOLIC BLOOD PRESSURE: 80 MMHG

## 2024-09-23 PROBLEM — R29.810 FACIAL DROOP: Status: ACTIVE | Noted: 2024-09-23

## 2024-09-23 PROBLEM — R51.9 HEADACHE: Status: ACTIVE | Noted: 2024-09-23

## 2024-09-23 PROBLEM — M54.12 CERVICAL RADICULITIS: Status: ACTIVE | Noted: 2024-09-23

## 2024-09-23 PROBLEM — G24.01 TARDIVE DYSKINESIA: Status: ACTIVE | Noted: 2024-09-23

## 2024-09-23 PROBLEM — Z98.1 S/P CERVICAL SPINAL FUSION: Status: ACTIVE | Noted: 2024-09-23

## 2024-09-23 LAB
AORTIC ROOT: 2.9 CM
APICAL FOUR CHAMBER EJECTION FRACTION: 56 %
ATRIAL RATE: 93 BPM
BSA FOR ECHO PROCEDURE: 1.91 M2
CHOLEST SERPL-MCNC: 125 MG/DL
E WAVE DECELERATION TIME: 161 MS
E/A RATIO: 1.21
EST. AVERAGE GLUCOSE BLD GHB EST-MCNC: 108 MG/DL
FRACTIONAL SHORTENING: 35 (ref 28–44)
HBA1C MFR BLD: 5.4 %
HDLC SERPL-MCNC: 23 MG/DL
INTERVENTRICULAR SEPTUM IN DIASTOLE (PARASTERNAL SHORT AXIS VIEW): 1.1 CM
INTERVENTRICULAR SEPTUM: 1.1 CM (ref 0.6–1.1)
LAAS-AP2: 15.2 CM2
LAAS-AP4: 14.1 CM2
LDLC SERPL CALC-MCNC: 87 MG/DL (ref 0–100)
LEFT ATRIUM SIZE: 3 CM
LEFT ATRIUM VOLUME (MOD BIPLANE): 36 ML
LEFT ATRIUM VOLUME INDEX (MOD BIPLANE): 18.8 ML/M2
LEFT INTERNAL DIMENSION IN SYSTOLE: 3.1 CM (ref 2.1–4)
LEFT VENTRICULAR INTERNAL DIMENSION IN DIASTOLE: 4.8 CM (ref 3.5–6)
LEFT VENTRICULAR POSTERIOR WALL IN END DIASTOLE: 1 CM
LEFT VENTRICULAR STROKE VOLUME: 70 ML
LVSV (TEICH): 70 ML
MV E'TISSUE VEL-LAT: 17 CM/S
MV E'TISSUE VEL-SEP: 13 CM/S
MV PEAK A VEL: 0.71 M/S
MV PEAK E VEL: 86 CM/S
MV STENOSIS PRESSURE HALF TIME: 47 MS
MV VALVE AREA P 1/2 METHOD: 4.68
P AXIS: 36 DEGREES
PR INTERVAL: 132 MS
QRS AXIS: -4 DEGREES
QRSD INTERVAL: 88 MS
QT INTERVAL: 366 MS
QTC INTERVAL: 455 MS
RA PRESSURE ESTIMATED: 8 MMHG
RIGHT ATRIUM AREA SYSTOLE A4C: 10.4 CM2
RIGHT VENTRICLE ID DIMENSION: 3.1 CM
RV PSP: 28 MMHG
SL CV LEFT ATRIUM LENGTH A2C: 4.5 CM
SL CV LV EF: 55
SL CV PED ECHO LEFT VENTRICLE DIASTOLIC VOLUME (MOD BIPLANE) 2D: 108 ML
SL CV PED ECHO LEFT VENTRICLE SYSTOLIC VOLUME (MOD BIPLANE) 2D: 38 ML
T WAVE AXIS: -16 DEGREES
TR MAX PG: 20 MMHG
TR PEAK VELOCITY: 2.2 M/S
TRICUSPID ANNULAR PLANE SYSTOLIC EXCURSION: 1.9 CM
TRICUSPID VALVE PEAK REGURGITATION VELOCITY: 2.23 M/S
TRIGL SERPL-MCNC: 77 MG/DL
VENTRICULAR RATE: 93 BPM

## 2024-09-23 PROCEDURE — 93010 ELECTROCARDIOGRAM REPORT: CPT | Performed by: INTERNAL MEDICINE

## 2024-09-23 PROCEDURE — 93306 TTE W/DOPPLER COMPLETE: CPT

## 2024-09-23 PROCEDURE — 80061 LIPID PANEL: CPT

## 2024-09-23 PROCEDURE — 97165 OT EVAL LOW COMPLEX 30 MIN: CPT

## 2024-09-23 PROCEDURE — 99238 HOSP IP/OBS DSCHRG MGMT 30/<: CPT | Performed by: FAMILY MEDICINE

## 2024-09-23 PROCEDURE — 99223 1ST HOSP IP/OBS HIGH 75: CPT | Performed by: FAMILY MEDICINE

## 2024-09-23 PROCEDURE — 83036 HEMOGLOBIN GLYCOSYLATED A1C: CPT

## 2024-09-23 PROCEDURE — 70551 MRI BRAIN STEM W/O DYE: CPT

## 2024-09-23 PROCEDURE — 99222 1ST HOSP IP/OBS MODERATE 55: CPT | Performed by: PSYCHIATRY & NEUROLOGY

## 2024-09-23 PROCEDURE — 93306 TTE W/DOPPLER COMPLETE: CPT | Performed by: INTERNAL MEDICINE

## 2024-09-23 RX ORDER — NICOTINE 21 MG/24HR
1 PATCH, TRANSDERMAL 24 HOURS TRANSDERMAL DAILY
Status: DISCONTINUED | OUTPATIENT
Start: 2024-09-23 | End: 2024-09-23 | Stop reason: HOSPADM

## 2024-09-23 RX ORDER — DIPHENHYDRAMINE HCL 25 MG
25 TABLET ORAL EVERY 8 HOURS PRN
Qty: 15 TABLET | Refills: 0 | Status: SHIPPED | OUTPATIENT
Start: 2024-09-23

## 2024-09-23 RX ORDER — HYDROCODONE BITARTRATE AND ACETAMINOPHEN 5; 325 MG/1; MG/1
1 TABLET ORAL
COMMUNITY

## 2024-09-23 RX ORDER — GABAPENTIN 300 MG/1
600 CAPSULE ORAL
Status: DISCONTINUED | OUTPATIENT
Start: 2024-09-23 | End: 2024-09-23 | Stop reason: HOSPADM

## 2024-09-23 RX ORDER — ACETAMINOPHEN 325 MG/1
650 TABLET ORAL EVERY 8 HOURS PRN
Status: DISCONTINUED | OUTPATIENT
Start: 2024-09-23 | End: 2024-09-23 | Stop reason: HOSPADM

## 2024-09-23 RX ORDER — SODIUM CHLORIDE 9 MG/ML
100 INJECTION, SOLUTION INTRAVENOUS CONTINUOUS
Status: DISCONTINUED | OUTPATIENT
Start: 2024-09-23 | End: 2024-09-23 | Stop reason: HOSPADM

## 2024-09-23 RX ORDER — PROCHLORPERAZINE MALEATE 10 MG
10 TABLET ORAL EVERY 8 HOURS PRN
Qty: 15 TABLET | Refills: 0 | Status: SHIPPED | OUTPATIENT
Start: 2024-09-23

## 2024-09-23 RX ORDER — NICOTINE 21 MG/24HR
1 PATCH, TRANSDERMAL 24 HOURS TRANSDERMAL DAILY
Qty: 28 PATCH | Refills: 0 | Status: SHIPPED | OUTPATIENT
Start: 2024-09-23

## 2024-09-23 RX ORDER — ASPIRIN 81 MG/1
81 TABLET, CHEWABLE ORAL DAILY
Status: DISCONTINUED | OUTPATIENT
Start: 2024-09-23 | End: 2024-09-23 | Stop reason: HOSPADM

## 2024-09-23 RX ORDER — TIZANIDINE 2 MG/1
4 TABLET ORAL
Status: DISCONTINUED | OUTPATIENT
Start: 2024-09-23 | End: 2024-09-23 | Stop reason: HOSPADM

## 2024-09-23 RX ORDER — ENOXAPARIN SODIUM 100 MG/ML
40 INJECTION SUBCUTANEOUS DAILY
Status: DISCONTINUED | OUTPATIENT
Start: 2024-09-23 | End: 2024-09-23 | Stop reason: HOSPADM

## 2024-09-23 RX ORDER — LORAZEPAM 2 MG/ML
1 INJECTION INTRAMUSCULAR ONCE
Status: COMPLETED | OUTPATIENT
Start: 2024-09-23 | End: 2024-09-23

## 2024-09-23 RX ORDER — PANTOPRAZOLE SODIUM 40 MG/1
40 TABLET, DELAYED RELEASE ORAL
Status: DISCONTINUED | OUTPATIENT
Start: 2024-09-23 | End: 2024-09-23 | Stop reason: HOSPADM

## 2024-09-23 RX ORDER — ATORVASTATIN CALCIUM 40 MG/1
40 TABLET, FILM COATED ORAL EVERY EVENING
Status: DISCONTINUED | OUTPATIENT
Start: 2024-09-23 | End: 2024-09-23 | Stop reason: HOSPADM

## 2024-09-23 RX ORDER — ROPINIROLE 0.25 MG/1
0.5 TABLET, FILM COATED ORAL 2 TIMES DAILY
Status: DISCONTINUED | OUTPATIENT
Start: 2024-09-23 | End: 2024-09-23 | Stop reason: HOSPADM

## 2024-09-23 RX ORDER — KETOROLAC TROMETHAMINE 10 MG/1
10 TABLET, FILM COATED ORAL EVERY 8 HOURS PRN
Qty: 15 TABLET | Refills: 0 | Status: SHIPPED | OUTPATIENT
Start: 2024-09-23

## 2024-09-23 RX ORDER — HYDROCODONE BITARTRATE AND ACETAMINOPHEN 5; 325 MG/1; MG/1
1 TABLET ORAL
Status: DISCONTINUED | OUTPATIENT
Start: 2024-09-23 | End: 2024-09-23 | Stop reason: HOSPADM

## 2024-09-23 RX ADMIN — PANTOPRAZOLE SODIUM 40 MG: 40 TABLET, DELAYED RELEASE ORAL at 05:32

## 2024-09-23 RX ADMIN — HYDROCODONE BITARTRATE AND ACETAMINOPHEN 1 TABLET: 5; 325 TABLET ORAL at 01:38

## 2024-09-23 RX ADMIN — ROPINIROLE 0.5 MG: 0.25 TABLET, FILM COATED ORAL at 08:24

## 2024-09-23 RX ADMIN — LORAZEPAM 1 MG: 2 INJECTION INTRAMUSCULAR; INTRAVENOUS at 12:53

## 2024-09-23 RX ADMIN — SODIUM CHLORIDE 100 ML/HR: 0.9 INJECTION, SOLUTION INTRAVENOUS at 01:57

## 2024-09-23 RX ADMIN — ENOXAPARIN SODIUM 40 MG: 40 INJECTION SUBCUTANEOUS at 08:24

## 2024-09-23 RX ADMIN — ASPIRIN 81 MG CHEWABLE TABLET 81 MG: 81 TABLET CHEWABLE at 08:24

## 2024-09-23 RX ADMIN — GABAPENTIN 600 MG: 300 CAPSULE ORAL at 01:38

## 2024-09-23 RX ADMIN — TIZANIDINE 4 MG: 2 TABLET ORAL at 01:38

## 2024-09-23 RX ADMIN — ACETAMINOPHEN 650 MG: 325 TABLET ORAL at 08:24

## 2024-09-23 RX ADMIN — ATORVASTATIN CALCIUM 40 MG: 40 TABLET, FILM COATED ORAL at 01:38

## 2024-09-23 RX ADMIN — NICOTINE 1 PATCH: 21 PATCH, EXTENDED RELEASE TRANSDERMAL at 08:24

## 2024-09-23 NOTE — H&P
H&P - Hospitalist   Name: Sandi Gaston 34 y.o. female I MRN: [id number]  Unit/Bed#: [address] [place] [address] Date of Admission: 9/22/2024   Date of Service: 9/23/2024 I Hospital Day: 0     Assessment & Plan  Facial droop  Present admission with right facial droop, blurry vision, and dysarthria while cutting her  here at 7:30 PM   She states that 2 nights ago she was lightheaded and nauseated with ringing in ear  Patient states she has a history of anxiety and thought her symptoms are for anxiety. She states that she did have recent surgery to her neck due to herniated disks in July at [facility]  Patient symptoms resolved on presentation to the ED,  NIH is 0 in the ED  In ED received 325 mg of aspirin  CT Brain:  No acute intracranial abnormality.  CT Angiography:  Unremarkable CTA neck and brain.  No focal data neurodeficits noted, patient states she has intermittent tingling in arms and hands but that is chronic, and decreased strength in lower extremities since July due to her surgery to her nec and chronic issues with her back.  No numbness and tingling in the lower extremities, no blurry vision, & denies headaches  Suspect TIA vs CVA vs anxeity vs TD  Not a TNK candidate NIH 0  Initiate stroke pathway  Obtain MRI  Obtain echo  Continue aspirin 81 mg and Lipitor 40 mg daily  Obtain lipid panel & A1c  Neuro consulted, appreciate recommendations  Monitor on telemetry  Allow for permissive hypertension  IV hydration normal saline at 100 mL/hr     Anxiety and depression  Mood stable  Home medication: Caplyta continue  Tobacco abuse  Patient patient smokes 2 packs of cigarettes per day  Smoking cessation provided  Nicotine patich  Cervical radiculitis   S/p 7/15/24: C4-C5 total disc replacement with cervical decompression  [facility]  Home medication PD MP reviewed by Vicodin continued   Tardive dyskinesia  Follows with [facility]    VTE  Pharmacologic Prophylaxis:   High Risk (Score >/= 5) - Pharmacological DVT Prophylaxis Ordered: enoxaparin (Lovenox). Sequential Compression Devices Ordered.  Code Status: Level 1 - Full Code with patient  Discussion with family: Updated  () at bedside.    Anticipated Length of Stay: Patient will be admitted on an observation basis with an anticipated length of stay of less than 2 midnights secondary to right facial droop r/o TIA vs CVA, neurology consulted.    History of Present Illness   Chief Complaint: Right facial droop dysarthria and blurry vision    Sandi Gaston is a 34 y.o. female with a PMH of depression, tobacco abuse, cervical radiculitis, TD who presents with right facial droop, dysarthria, and blurry vision.  Patient states she was cutting her 's hair around 730 tonight, and has been noticed that she had a right facial droop, trouble speaking, and the patient noticed she had blurry vision.  She states she recently had surgery in July for her neck.  She states yesterday she also had an episode of lightheadedness and nausea 2nights ago but that resolved.  She states that her symptoms have resolved since being admitted to the ED.  She denies headaches, dizziness, nausea vomiting, chest palpitations and chest pain.   Review of Systems   Constitutional:  Negative for chills and fever.   HENT:  Negative for ear pain and sore throat.    Eyes:  Negative for pain and visual disturbance.   Respiratory:  Negative for cough and shortness of breath.    Cardiovascular:  Negative for chest pain and palpitations.   Gastrointestinal:  Negative for abdominal pain and vomiting.   Genitourinary:  Negative for dysuria and hematuria.   Musculoskeletal:  Negative for arthralgias and back pain.   Skin:  Negative for color change and rash.   Neurological:  Positive for facial asymmetry, speech difficulty and light-headedness. Negative for seizures and syncope.   All other systems reviewed and  are negative.        Social History:  Marital Status: /Civil Union   Occupation: Employed  Patient Pre-hospital Living Situation: Home  Patient Pre-hospital Level of Mobility: walks  Patient Pre-hospital Diet Restrictions: none    Objective     Vitals:   Blood Pressure: 128/80 (09/23/24 0053)  Pulse: 80 (09/22/24 2245)  Temperature: 97.9 °F (36.6 °C) (09/23/24 0053)  Temp Source: Tympanic (09/22/24 2121)  Respirations: 17 (09/23/24 0053)  Weight - Scale: 84.1 kg (185 lb 6.4 oz) (09/22/24 2121)  SpO2: 100 % (09/22/24 2245)    Physical Exam  Vitals and nursing note reviewed.   Eyes:      General: No visual field deficit.  Cardiovascular:      Rate and Rhythm: Normal rate and regular rhythm.      Pulses: Normal pulses.      Heart sounds: Normal heart sounds.   Pulmonary:      Effort: Pulmonary effort is normal.      Breath sounds: No wheezing or rales.   Abdominal:      General: Bowel sounds are normal.      Palpations: Abdomen is soft.      Tenderness: There is no abdominal tenderness.   Musculoskeletal:         General: Normal range of motion.   Skin:     General: Skin is warm.   Neurological:      General: No focal deficit present.      Mental Status: She is alert. Mental status is at baseline.      GCS: GCS eye subscore is 4. GCS verbal subscore is 5. GCS motor subscore is 6.      Cranial Nerves: Cranial nerves 2-12 are intact. No cranial nerve deficit, dysarthria or facial asymmetry.      Sensory: Sensation is intact.      Motor: Motor function is intact.      Coordination: Coordination is intact.         Lines/Drains:  Lines/Drains/Airways       Active Status       None                        Additional Data:   Lab Results: I have reviewed the following results:   Results from last 7 days   Lab Units 09/22/24 2123   WBC Thousand/uL 10.13   HEMOGLOBIN g/dL 15.4   HEMATOCRIT % 44.8   PLATELETS Thousands/uL 369   SEGS PCT % 71   LYMPHO PCT % 22   MONO PCT % 6   EOS PCT % 1     Results from last 7 days   Lab  "Units 09/22/24 2123   SODIUM mmol/L 137   POTASSIUM mmol/L 3.4*   CHLORIDE mmol/L 102   CO2 mmol/L 24   BUN mg/dL 2*   CREATININE mg/dL 0.80   ANION GAP mmol/L 11   CALCIUM mg/dL 9.1   ALBUMIN g/dL 4.7   TOTAL BILIRUBIN mg/dL 0.50   ALK PHOS U/L 64   ALT U/L 22   AST U/L 27   GLUCOSE RANDOM mg/dL 103     Results from last 7 days   Lab Units 09/22/24 2123   INR  1.06     Results from last 7 days   Lab Units 09/22/24 2115   POC GLUCOSE mg/dl 120     No results found for: \"HGBA1C\"        Imaging Review: Reviewed radiology reports from this admission including: CT head. CTA  Other Studies: EKG was reviewed.  EKG was personally reviewed and my interpretation is: Personally Reviewed. NSR. HR 93..    Administrative Statements       ** Please Note: This note has been constructed using a voice recognition system. **    "

## 2024-09-23 NOTE — OCCUPATIONAL THERAPY NOTE
"OT EVALUATION       ** No further skilled OT or PT needs at this time.  Discharge recommendations home with resumption of out-pt PT s/p neck surgery.     09/23/24 1135   OT Last Visit   OT Visit Date 09/23/24   Note Type   Note type Evaluation   Pain Assessment   Pain Assessment Tool 0-10   Pain Score 2   Pain Location/Orientation Location: Neck   Hospital Pain Intervention(s) Repositioned;Ambulation/increased activity;Emotional support  (RN aware)   Restrictions/Precautions   Other Precautions Pain   Home Living   Type of Home House   Home Layout One level;Stairs to enter without rails  (2 YAJAIRA)   Bathroom Shower/Tub Tub/shower unit   Bathroom Toilet Standard   Bathroom Equipment Shower chair;Hand-held shower   Bathroom Accessibility Accessible   Home Equipment Wheelchair-manual;Crutches;Walker   Additional Comments Pt ambulates independently without AD PTA   Prior Function   Level of [place] Independent with ADLs;Independent with functional mobility;Independent with IADLS   Lives With Spouse;Family  (children age 15 and 4)   Receives Help From Family   IADLs Independent with driving;Independent with meal prep;Independent with medication management   Falls in the last 6 months 1 to 4   Vocational On disability   General   Additional Pertinent History pt receiving out-pt PT 3x/week s/p recent ACDF in July 2024   Subjective   Subjective \"I' just really tired.\"   ADL   Where Assessed Chair   Eating Assistance 7  Independent   Grooming Assistance 7  Independent   UB Bathing Assistance 7  Independent   LB Bathing Assistance 7  Independent   UB Dressing Assistance 7  Independent   LB Dressing Assistance 7  Independent   Toileting Assistance  7  Independent   Bed Mobility   Supine to Sit 7  Independent   Sit to Supine 7  Independent   Transfers   Sit to Stand 7  Independent   Stand to Sit 7  Independent   Stand pivot 7  Independent   Functional Mobility   Functional Mobility 7  Independent   Additional Comments 200 " "feet   Balance   Static Sitting Good   Dynamic Sitting Good   Static Standing Good   Dynamic Standing Good   Ambulatory Good   Activity Tolerance   Activity Tolerance Patient tolerated treatment well   RUE Assessment   RUE Assessment WFL   LUE Assessment   LUE Assessment WFL   Sensation   Additional Comments occasional numbness/tingling BUE for a \"few seconds\" x several months, but has gotten better since surgery and does not effect pt's ability to perform ADLS   Vision-Basic Assessment   Current Vision Does not wear glasses   Psychosocial   Psychosocial (WDL) WDL   Cognition   Overall Cognitive Status WFL   Assessment   Prognosis Good   Assessment Patient evaluated by Occupational Therapy.  Patient admitted with Facial droop.  The patients occupational profile, medical and therapy history includes a expanded review of medical and/or therapy records and additional review of physical, cognitive, or psychosocial history related to current functional performance.  Comorbidities affecting functional mobility and ADLS include: anxiety, depression, bipolar disorder, recent C4-5 ACDF at [facility] in July 2024, smokles 2 ppd.  Prior to admission, patient was independent with functional mobility without assistive device, independent with ADLS, independent with IADLS, living with family in a 1 level home with 2 steps to enter, ambulating household distance, ambulating community distances, on disability, and attending outpatient PT.  The evaluation identifies the following performance deficits: weakness, decreased endurance, decreased activity tolerance, and orthopedic restrictions, that result in activity limitations and/or participation restrictions. This evaluation requires clinical decision making of low complexity, because the patients presents with no comorbidities that affect occupational performance and required no modification of tasks or assistance with consideration of a limited number of treatment " options.  The Barthel Index was used as a functional outcome tool presenting with a score of  , indicating no limitations of functional mobility and ADLS.  The patient's raw score on the AM-PAC Daily Activity Inpatient Short Form is 24. A raw score of greater than or equal to 19 suggests the patient may benefit from discharge to home. Patient is independent with ADLS and ambulation.  No further skilled OT or PT needs at this time.  Discharge recommendations home with resumption of out-pt PT s/p neck surgery.   Goals   Patient Goals n/a   Plan   OT Frequency Eval only   Discharge Recommendation   Rehab Resource Intensity Level, OT No post-acute rehabilitation needs   AM-PAC Daily Activity Inpatient   Lower Body Dressing 4   Bathing 4   Toileting 4   Upper Body Dressing 4   Grooming 4   Eating 4   Daily Activity Raw Score 24   Daily Activity Standardized Score (Calc for Raw Score >=11) 57.54   AM-PAC Applied Cognition Inpatient   Following a Speech/Presentation 4   Understanding Ordinary Conversation 4   Taking Medications 4   Remembering Where Things Are Placed or Put Away 4   Remembering List of 4-5 Errands 4   Taking Care of Complicated Tasks 4   Applied Cognition Raw Score 24   Applied Cognition Standardized Score 62.21   End of Consult   Patient Position at End of Consult Bedside chair;All needs within reach   Nurse Communication Nurse aware of consult   Licensure   [place] License Number  Ryanne Maguire MS, OTR/L, [place] Lic# [id number]

## 2024-09-23 NOTE — ASSESSMENT & PLAN NOTE
Present admission with right facial droop, blurry vision, and dysarthria while cutting her  here at 7:30 PM   She states that 2 nights ago she was lightheaded and nauseated with ringing in ear  Patient states she has a history of anxiety and thought her symptoms are for anxiety. She states that she did have recent surgery to her neck due to herniated disks in July at [facility]  Patient symptoms resolved on presentation to the ED,  NIH is 0 in the ED  In ED received 325 mg of aspirin  CT Brain:  No acute intracranial abnormality.  CT Angiography:  Unremarkable CTA neck and brain.  No focal data neurodeficits noted, patient states she has intermittent tingling in arms and hands but that is chronic, and decreased strength in lower extremities since July due to her surgery to her nec and chronic issues with her back.  No numbness and tingling in the lower extremities, no blurry vision, & denies headaches  Suspect TIA vs CVA vs anxeity vs TD  Not a TNK candidate NIH 0  Initiate stroke pathway  Obtain MRI  Obtain echo  Continue aspirin 81 mg and Lipitor 40 mg daily  Obtain lipid panel & A1c  Neuro consulted, appreciate recommendations  Monitor on telemetry  Allow for permissive hypertension  IV hydration normal saline at 100 mL/hr

## 2024-09-23 NOTE — ASSESSMENT & PLAN NOTE
Presented with right facial droop, blurry vision, and dysarthria while cutting her 's hair at 7:30 PM.  About 2 nights ago she was lightheaded and nauseated with ringing in ear  Patient states she has a history of anxiety and thought her symptoms are for anxiety. She states that she did have recent surgery to her neck due to herniated disks in July at [facility]  Patient symptoms resolved on presentation to the ED,  NIH is 0 in the ED  In ED received 325 mg of aspirin  CT Brain:  No acute intracranial abnormality.  CT Angiography:  Unremarkable CTA neck and brain.  MRI negative for CVA  echo -normal EF, normal diastolic function and no PFO  Was on aspirin 81 mg and Lipitor 40 mg daily, no indication for use on discharge  Lipid panel - LDL 87 & A1c - 5.4  Toradol + Benadryl + Compazine as needed on discharge for migraine headache

## 2024-09-23 NOTE — ASSESSMENT & PLAN NOTE
S/p 7/15/24: C4-C5 total disc replacement with cervical decompression  [facility].  Home medication PD MP reviewed by Vicodin continued

## 2024-09-23 NOTE — PLAN OF CARE
Problem: NEUROSENSORY - ADULT  Goal: Remains free of injury related to seizures activity  Description: INTERVENTIONS  - Maintain airway, patient safety  and administer oxygen as ordered  - Monitor patient for seizure activity, document and report duration and description of seizure to physician/advanced practitioner  - If seizure occurs,  ensure patient safety during seizure  - Reorient patient post seizure  - Seizure pads on all 4 side rails  - Instruct patient/family to notify RN of any seizure activity including if an aura is experienced  - Instruct patient/family to call for assistance with activity based on nursing assessment  - Administer anti-seizure medications if ordered    Outcome: Progressing     Problem: NEUROSENSORY - ADULT  Goal: Achieves stable or improved neurological status  Description: INTERVENTIONS  - Monitor and report changes in neurological status  - Monitor vital signs such as temperature, blood pressure, glucose, and any other labs ordered   - Initiate measures to prevent increased intracranial pressure  - Monitor for seizure activity and implement precautions if appropriate      Outcome: Progressing     Problem: Neurological Deficit  Goal: Neurological status is stable or improving  Description: Interventions:  - Monitor and assess patient's level of consciousness, motor function, sensory function, and level of assistance needed for ADLs.   - Monitor and report changes from baseline. Collaborate with interdisciplinary team to initiate plan and implement interventions as ordered.   - Provide and maintain a safe environment.  - Consider seizure precautions.  - Consider fall precautions.  - Consider aspiration precautions.  - Consider bleeding precautions.  Outcome: Progressing     Problem: Activity Intolerance/Impaired Mobility  Goal: Mobility/activity is maintained at optimum level for patient  Description: Interventions:  - Assess and monitor patient  barriers to mobility and need for  assistive/adaptive devices.  - Assess patient's emotional response to limitations.  - Collaborate with interdisciplinary team and initiate plans and interventions as ordered.  - Encourage independent activity per ability.  - Maintain proper body alignment.  - Perform active/passive rom as tolerated/ordered.  - Plan activities to conserve energy.  - Turn patient as appropriate  Outcome: Progressing     Problem: Communication Impairment  Goal: Ability to express needs and understand communication  Description: Assess patient's communication skills and ability to understand information.  Patient will demonstrate use of effective communication techniques, alternative methods of communication and understanding even if not able to speak.     - Encourage communication and provide alternate methods of communication as needed.  - Collaborate with case management/ for discharge needs.  - Include patient/family/caregiver in decisions related to communication.  Outcome: Progressing

## 2024-09-23 NOTE — ED PROVIDER NOTES
1. Facial droop    2. Headache    3. Tobacco abuse      ED Disposition       ED Disposition   Admit    Condition   Stable    Date/Time   Sun Sep 22, 2024 11:59 PM    Comment   Case was discussed with JED Joseph and the patient's admission status was agreed to be Admission Status: inpatient status to the service of Dr. Valdez .               Assessment & Plan       Medical Decision Making  Patient's transient dysarthria and facial droop concerning for a TIA.  Patient currently asymptomatic.  I ordered and reviewed lab work including CBC, CMP, PT, PTT.  I ordered and independently interpreted EKG which demonstrates normal sinus rhythm rate of 93 with nonspecific ST changes.  I ordered a CTA head and neck.  Patient without significant lab derangements.  Signed out pending results of CTA with plan for admission on stroke pathway for evaluation of possible TIA.    Amount and/or Complexity of Data Reviewed  Labs: ordered.  Radiology: ordered.    Risk  OTC drugs.  Prescription drug management.  Decision regarding hospitalization.            Stroke Assessment       Row Name 09/22/24 2127             NIH Stroke Scale    Interval Baseline      Level of Consciousness (1a.) 0      LOC Questions (1b.) 0      LOC Commands (1c.) 0      Best Gaze (2.) 0      Visual (3.) 0      Facial Palsy (4.) 0      Motor Arm, Left (5a.) 0      Motor Arm, Right (5b.) 0      Motor Leg, Left (6a.) 0      Motor Leg, Right (6b.) 0      Limb Ataxia (7.) 0      Sensory (8.) 0      Best Language (9.) 0      Dysarthria (10.) 0      Extinction and Inattention (11.) (Formerly Neglect) 0      Total 0                    Flowsheet Row Most Recent Value   Thrombolytic Decision Options    Thrombolytic Decision Patient not a candidate.   Patient is not a candidate options Symptoms resolved/clearly non disabling.                 Medications   aspirin tablet 325 mg (325 mg Oral Given 9/22/24 2131)   iohexol (OMNIPAQUE) 350 MG/ML injection (MULTI-DOSE)  100 mL (100 mL Intravenous Given 9/22/24 6494)       History of Present Illness       Patient is a 34-year-old female presenting for evaluation of transient dysarthria and right-sided facial droop.  Patient's  states that he witnessed this about an hour prior to arrival in the emergency department while they were at home.  Symptoms lasted for few minutes then resolved.  Patient was not aware of the symptoms when they are happening.  Patient states that she has frequent headaches at baseline, states she has a mild headache at this time, denies sudden or maximal component.  Patient is status post cervical surgery 2 months ago, denies any posterior neck pain or swelling, fevers, chills, denies ongoing focal weakness or numbness, vision changes.  Patient denies any prior similar episodes.  Patient does state that yesterday evening she had an episode of dizziness and lightheadedness which quickly resolved.  Patient states that she is eating, drinking, urinating, stooling normally.        Review of Systems   Constitutional:  Negative for chills, fatigue and fever.   Respiratory:  Negative for cough and shortness of breath.    Cardiovascular:  Negative for chest pain and palpitations.   Gastrointestinal:  Negative for diarrhea, nausea and vomiting.   Musculoskeletal:  Negative for arthralgias and myalgias.   Neurological:  Positive for dizziness (Resolved), facial asymmetry (Resolved) and speech difficulty (Resolved). Negative for weakness, numbness and headaches.   Psychiatric/Behavioral:  Negative for confusion.    All other systems reviewed and are negative.          Objective     ED Triage Vitals   Temperature Pulse Blood Pressure Respirations SpO2 Patient Position - Orthostatic VS   09/22/24 2121 09/22/24 2121 09/22/24 2121 09/22/24 2121 09/22/24 2121 09/22/24 2121   98.5 °F (36.9 °C) 93 159/94 20 100 % Sitting      Temp Source Heart Rate Source BP Location FiO2 (%) Pain Score    09/22/24 2121 09/22/24 2121  09/22/24 2121 -- 09/23/24 0114    Tympanic Monitor Left arm  5        Physical Exam  Vitals and nursing note reviewed.   Constitutional:       General: She is not in acute distress.     Appearance: Normal appearance. She is not ill-appearing, toxic-appearing or diaphoretic.      Comments: Anxious appearing but nontoxic nondistressed   HENT:      Head: Normocephalic and atraumatic.      Comments: No external signs of trauma.  Pupils 3 mm bilaterally, reactive to light, normal EOM.  No nystagmus.     Right Ear: External ear normal.      Left Ear: External ear normal.   Eyes:      General:         Right eye: No discharge.         Left eye: No discharge.   Cardiovascular:      Comments: Regular rate and rhythm, no murmurs rubs or gallops.  Extremities warm and well-perfused without mottling  Pulmonary:      Effort: No respiratory distress.      Comments: No increased work of breathing.  Speaking in complete sentences.  Lungs clear to auscultation bilaterally without wheezes, rales, rhonchi.  Satting 100% on room air indicating adequate oxygenation  Abdominal:      General: There is no distension.      Comments: Abdomen soft, nontender, nondistended without rigidity, rebound, guarding   Musculoskeletal:         General: No deformity.      Cervical back: Normal range of motion.   Skin:     Findings: No lesion or rash.   Neurological:      Mental Status: She is alert and oriented to person, place, and time. Mental status is at baseline.      Comments: Awake, alert, pleasant, interactive.  Cranial nerves II through XII intact.  Full strength and sensation bilaterally in upper and lower extremities.  Normal finger-nose.  No pronator drift.  No dysarthria.   Psychiatric:         Mood and Affect: Mood and affect normal.         Labs Reviewed   COMPREHENSIVE METABOLIC PANEL - Abnormal       Result Value    Sodium 137      Potassium 3.4 (*)     Chloride 102      CO2 24      ANION GAP 11      BUN 2 (*)     Creatinine 0.80       Glucose 103      Calcium 9.1      AST 27      ALT 22      Alkaline Phosphatase 64      Total Protein 7.0      Albumin 4.7      Total Bilirubin 0.50      eGFR 97      Narrative:     National Kidney Disease Foundation guidelines for Chronic Kidney Disease (CKD):     Stage 1 with normal or high GFR (GFR > 90 mL/min/1.73 square meters)    Stage 2 Mild CKD (GFR = 60-89 mL/min/1.73 square meters)    Stage 3A Moderate CKD (GFR = 45-59 mL/min/1.73 square meters)    Stage 3B Moderate CKD (GFR = 30-44 mL/min/1.73 square meters)    Stage 4 Severe CKD (GFR = 15-29 mL/min/1.73 square meters)    Stage 5 End Stage CKD (GFR <15 mL/min/1.73 square meters)  Note: GFR calculation is accurate only with a steady state creatinine   LIPID PANEL WITH DIRECT LDL REFLEX - Abnormal    Cholesterol 125      Triglycerides 77      HDL, Direct 23 (*)     LDL Calculated 87     PROTIME-INR - Normal    Protime 14.3      INR 1.06      Narrative:     INR Therapeutic Range    Indication                                             INR Range      Atrial Fibrillation                                               2.0-3.0  Hypercoagulable State                                    2.0.2.3  Left Ventricular Asist Device                            2.0-3.0  Mechanical Heart Valve                                  -    Aortic(with afib, MI, embolism, HF, LA enlargement,    and/or coagulopathy)                                     2.0-3.0 (2.5-3.5)     Mitral                                                             2.5-3.5  Prosthetic/Bioprosthetic Heart Valve               2.0-3.0  Venous thromboembolism (VTE: VT, PE        2.0-3.0   APTT - Normal    PTT 27     POCT GLUCOSE - Normal    POC Glucose 120     CBC AND DIFFERENTIAL    WBC 10.13      RBC 4.94      Hemoglobin 15.4      Hematocrit 44.8      MCV 91      MCH 31.2      MCHC 34.4      RDW 12.2      MPV 10.0      Platelets 369      nRBC 0      Segmented % 71      Immature Grans % 0      Lymphocytes % 22       Monocytes % 6      Eosinophils Relative 1      Basophils Relative 0      Absolute Neutrophils 7.09      Absolute Immature Grans 0.02      Absolute Lymphocytes 2.26      Absolute Monocytes 0.65      Eosinophils Absolute 0.08      Basophils Absolute 0.03     HEMOGLOBIN A1C    Hemoglobin A1C 5.4             MRI brain wo contrast   Final Interpretation by Pallav N Shah, MD (09/23 1331)      Normal.      Workstation performed: [id number]         CTA head and neck with and without contrast   Final Interpretation by Randolph Johnson MD (09/22 1435)      CT Brain:  No acute intracranial abnormality.      CT Angiography:  Unremarkable CTA neck and brain.                  Workstation performed: [id number]             Procedures    ED Medication and Procedure Management   Prior to Admission Medications   Prescriptions Last Dose Informant Patient Reported? Taking?   Ergocalciferol (DRISDOL PO)   Yes No   Sig: Take by mouth   HYDROcodone-acetaminophen (NORCO) 5-325 mg per tablet 9/21/2024  Yes Yes   Sig: Take 1 tablet by mouth daily at bedtime   Lumateperone (Caplyta) 42 MG CAPS capsule 9/22/2024 at 2000  Yes Yes   Sig: Take 42 mg by mouth daily   Melatonin 10 MG TABS  Self Yes No   Sig: Take 10 mg by mouth daily at bedtime   Patient not taking: Reported on 12/26/2023   TiZANidine (ZANAFLEX) 4 MG capsule 9/21/2024  Yes Yes   Sig: Take 4 mg by mouth daily at bedtime   desvenlafaxine (PRISTIQ) 100 mg 24 hr tablet  Self Yes No   Sig: Take 25 mg by mouth daily   dicyclomine (BENTYL) 20 mg tablet   No No   Sig: Take 1 tablet (20 mg total) by mouth 2 (two) times a day   Patient not taking: Reported on 12/26/2023   famotidine (PEPCID) 20 mg tablet   No No   Sig: Take 1 tablet (20 mg total) by mouth 2 (two) times a day for 7 days   gabapentin (NEURONTIN) 400 mg capsule 9/21/2024  Yes Yes   Sig: Take 600 mg by mouth daily at bedtime   guaifenesin-codeine (GUAIFENESIN AC) 100-10 MG/5ML liquid   No No   Sig: Take 5 mL by mouth 4  (four) times a day as needed for cough   Patient not taking: Reported on 12/26/2023   hydrOXYzine HCL (ATARAX) 25 mg tablet   Yes No   Sig: Take 25 mg by mouth 3 (three) times a day   Patient not taking: Reported on 12/26/2023   methylprednisolone (MEDROL) 4 mg tablet   Yes No   Sig: Take 4 mg by mouth Blister pack   nicotine (NICODERM CQ) 21 mg/24 hr TD 24 hr patch   No No   Sig: Place 1 patch on the skin daily   Patient not taking: Reported on 2/4/2023   ondansetron (ZOFRAN) 4 mg tablet   No No   Sig: Take 1-2 tablets (4-8 mg total) by mouth every 8 (eight) hours as needed for nausea or vomiting   ondansetron (ZOFRAN-ODT) 4 mg disintegrating tablet   No No   Sig: Take 1 tablet (4 mg total) by mouth every 8 (eight) hours as needed for nausea or vomiting   pantoprazole (PROTONIX) 40 mg tablet 9/22/2024 at 2000  Yes Yes   Sig: Take 40 mg by mouth 2 (two) times a day   rOPINIRole (REQUIP) 0.5 mg tablet 9/22/2024 at 2000  Yes Yes   Sig: Take 0.5 mg by mouth 2 (two) times a day   triamcinolone (KENALOG) 0.025 % cream   No No   Sig: Apply topically 2 (two) times a day   ziprasidone (GEODON) 60 mg capsule   Yes No   Sig: Take 60 mg by mouth in the morning   Patient not taking: Reported on 12/26/2023      Facility-Administered Medications: None     Discharge Medication List as of 9/23/2024  4:12 PM        START taking these medications    Details   diphenhydrAMINE (BENADRYL) 25 mg tablet Take 1 tablet (25 mg total) by mouth every 8 (eight) hours as needed (migraine headache), Starting Mon 9/23/2024, Normal      ketorolac (TORADOL) 10 mg tablet Take 1 tablet (10 mg total) by mouth every 8 (eight) hours as needed (Migraine headache), Starting Mon 9/23/2024, Normal      prochlorperazine (COMPAZINE) 10 mg tablet Take 1 tablet (10 mg total) by mouth every 8 (eight) hours as needed for nausea or vomiting (migraine headache), Starting Mon 9/23/2024, Normal           CONTINUE these medications which have CHANGED    Details    nicotine (NICODERM CQ) 21 mg/24 hr TD 24 hr patch Place 1 patch on the skin over 24 hours daily, Starting Mon 9/23/2024, Normal           CONTINUE these medications which have NOT CHANGED    Details   gabapentin (NEURONTIN) 400 mg capsule Take 600 mg by mouth daily at bedtime, Historical Med      HYDROcodone-acetaminophen (NORCO) 5-325 mg per tablet Take 1 tablet by mouth daily at bedtime, Historical Med      Lumateperone (Caplyta) 42 MG CAPS capsule Take 42 mg by mouth daily, Historical Med      pantoprazole (PROTONIX) 40 mg tablet Take 40 mg by mouth 2 (two) times a day, Historical Med      rOPINIRole (REQUIP) 0.5 mg tablet Take 0.5 mg by mouth 2 (two) times a day, Historical Med      TiZANidine (ZANAFLEX) 4 MG capsule Take 4 mg by mouth daily at bedtime, Historical Med      desvenlafaxine (PRISTIQ) 100 mg 24 hr tablet Take 25 mg by mouth daily, Historical Med      Ergocalciferol (DRISDOL PO) Take by mouth, Historical Med      methylprednisolone (MEDROL) 4 mg tablet Take 4 mg by mouth Blister pack, Historical Med           STOP taking these medications       dicyclomine (BENTYL) 20 mg tablet Comments:   Reason for Stopping:         famotidine (PEPCID) 20 mg tablet Comments:   Reason for Stopping:         guaifenesin-codeine (GUAIFENESIN AC) 100-10 MG/5ML liquid Comments:   Reason for Stopping:         hydrOXYzine HCL (ATARAX) 25 mg tablet Comments:   Reason for Stopping:         Melatonin 10 MG TABS Comments:   Reason for Stopping:         ondansetron (ZOFRAN) 4 mg tablet Comments:   Reason for Stopping:         ondansetron (ZOFRAN-ODT) 4 mg disintegrating tablet Comments:   Reason for Stopping:         triamcinolone (KENALOG) 0.025 % cream Comments:   Reason for Stopping:         ziprasidone (GEODON) 60 mg capsule Comments:   Reason for Stopping:             No discharge procedures on file.     Mack Gonzalez MD  09/24/24 0702

## 2024-09-23 NOTE — SPEECH THERAPY NOTE
Consult received.  Records reviewed.  Pt admitted c slurred speech, facial droop (symptoms concerning for CVA/TIA).  Pt reported all sxs resolved. Pt passed RN Dysphagia Assessment (tolerated lunch tray today)  Communication deficits denied.    MRI results  pending.   Eloisa Miller MS CCC-SLP  [place] License [id number]

## 2024-09-23 NOTE — UTILIZATION REVIEW
Initial Clinical Review    Admission: Date/Time/Statement:   Admission Orders (From admission, onward)       Ordered        09/22/24 2359  INPATIENT ADMISSION  Once                          Orders Placed This Encounter   Procedures    INPATIENT ADMISSION     Standing Status:   Standing     Number of Occurrences:   1     Order Specific Question:   Level of Care     Answer:   Level 2 Stepdown / HOT [14]     Order Specific Question:   Estimated length of stay     Answer:   More than 2 Midnights     Order Specific Question:   Certification     Answer:   I certify that inpatient services are medically necessary for this patient for a duration of greater than two midnights. See H&P and MD Progress Notes for additional information about the patient's course of treatment.     ED Arrival Information       Expected   -    Arrival   9/22/2024 21:04    Acuity   Emergent              Means of arrival   Walk-In    Escorted by   Spouse    Service   Hospitalist    Admission type   Emergency              Arrival complaint   Facial Numbness; Slurred Speech             Chief Complaint   Patient presents with    STROKE Alert     Patient to room from waiting room. Patient had neck surgery on July 15th.  states he noted about a hour ago that the right side of her face was drooping and her speech was slurred and that her balance was off. Patient reports similar event at 1 am and she fell but she is denying injury. Provider at bedside . FSBS done .     Facial Droop       Initial Presentation: 34 y.o. female to ED presents for Right facial droop, dysarthria, and blurry vision. Pt states she was cutting her 's hair around 730 tonight, and has been noticed that she had a right facial droop, trouble speaking, and the patient noticed she had blurry vision. She had recent surgery in July for her neck. States yesterday she also had an episode of lightheadedness and nausea 2nights ago but that resolved.  States symptoms resolved  since in ED. PMH for Depression, Anxiety, tobacco abuse, cervical radiculitis, S/p 7/15/24: C4-C5 total disc replacement with cervical decompression. TD.   Admit to Inpatient Dx; Facial droop. Right facial droop, blurry vision, and dysarthria at 7:30pm  Given Aspirin 325 mg in ED. Not a TNK candidate  Plan; MRI. Echo. Tele monitoring. Neurology consult. Iv fluids.   Lipid panel & A1c. Continue aspirin and statin. Allow for permissive hypertension     Date: 9/23   Day 2:   Neurology cons; Facial droop  Stroke workup; MRI Brain. Echo with bubble study pending. Tele monitoring.   A1c. S/p Aspirin 325 mg x1, continue 81 mg daily. Statin. Allow for permissive hypertension with BP goal <220/110.   Treat with PRN antihypertensives. Goal normothermia and euclycemia.     Pt discharge to home.      ED Triage Vitals   Temperature Pulse Respirations Blood Pressure SpO2 Pain Score   09/22/24 2121 09/22/24 2121 09/22/24 2121 09/22/24 2121 09/22/24 2121 09/23/24 0114   98.5 °F (36.9 °C) 93 20 159/94 100 % 5     Weight (last 2 days)       Date/Time Weight    09/23/24 1000 83.5 (184)    09/23/24 0138 83.5 (184)    09/22/24 2121 84.1 (185.4)            Vital Signs (last 3 days)       Date/Time Temp Pulse Resp BP MAP (mmHg) SpO2 O2 Device Patient Position - Orthostatic VS [place] Coma Scale Score Pain    09/23/24 1000 98.2 °F (36.8 °C) 84 -- 98/57 71 97 % None (Room air) -- -- --    09/23/24 0900 98 °F (36.7 °C) 108 -- 98/57 71 98 % None (Room air) -- -- --    09/23/24 0824 -- -- -- -- -- -- -- -- -- 5 09/23/24 0800 98 °F (36.7 °C) -- -- -- -- -- None (Room air) -- 15 5    09/23/24 0700 -- 68 -- 114/71 85 97 % -- -- -- --    09/23/24 0553 -- -- -- -- -- -- -- -- 15 --    09/23/24 05:40:26 -- 106 18 102/69 80 100 % -- Standing - Orthostatic VS -- --    09/23/24 05:38:38 -- 82 18 112/70 84 96 % -- Sitting - Orthostatic VS -- --    09/23/24 05:37:07 -- 78 18 94/58 70 97 % -- Lying - Orthostatic VS -- --    09/23/24 0353 -- -- -- -- --  -- -- -- 15 --    09/23/24 0253 -- -- -- -- -- -- -- -- 15 --    09/23/24 0200 -- 79 -- 130/84 99 99 % -- -- -- --    09/23/24 0153 -- -- -- -- -- -- -- -- 15 --    09/23/24 0138 -- -- -- -- -- -- -- -- -- 5 09/23/24 0114 -- -- -- -- -- -- -- -- -- 5 09/23/24 0100 -- 84 -- 128/80 96 98 % -- -- -- --    09/23/24 00:53:49 97.9 °F (36.6 °C) -- 17 128/80 96 -- -- -- 15 --    09/22/24 2245 -- 80 20 111/61 79 100 % None (Room air) Lying -- --    09/22/24 2128 -- -- -- -- -- -- -- -- 15 --    09/22/24 2121 98.5 °F (36.9 °C) 93 20 159/94 -- 100 % None (Room air) Sitting -- --              Pertinent Labs/Diagnostic Test Results:   Radiology:  MRI brain wo contrast   Final Interpretation by Pallav N Shah, MD (09/23 1331)      Normal.      Workstation performed: [id number]         CTA head and neck with and without contrast   Final Interpretation by Randolph Johnson MD (09/22 9372)      CT Brain:  No acute intracranial abnormality.      CT Angiography:  Unremarkable CTA neck and brain.                  Workstation performed: [id number]           Cardiology:  Echo complete w/ contrast if indicated    by SEVERINO Burnham (09/23 6824)      ECG 12 lead   Final Result by Tala Sanchez MD (09/23 9529)   Normal sinus rhythm   Minimal voltage criteria for LVH, may be normal variant   Nonspecific ST abnormality   Abnormal ECG   When compared with ECG of 15-MAR-2022 19:00,   No significant change was found   Confirmed by Tala Sanchez ([id number]) on 9/23/2024 8:39:51 AM        GI:  No orders to display           Results from last 7 days   Lab Units 09/22/24  2123   WBC Thousand/uL 10.13   HEMOGLOBIN g/dL 15.4   HEMATOCRIT % 44.8   PLATELETS Thousands/uL 369   TOTAL NEUT ABS Thousands/µL 7.09         Results from last 7 days   Lab Units 09/22/24  2123   SODIUM mmol/L 137   POTASSIUM mmol/L 3.4*   CHLORIDE mmol/L 102   CO2 mmol/L 24   ANION GAP mmol/L 11   BUN mg/dL 2*   CREATININE mg/dL 0.80   EGFR ml/min/1.73sq m 97    CALCIUM mg/dL 9.1     Results from last 7 days   Lab Units 09/22/24  2123   AST U/L 27   ALT U/L 22   ALK PHOS U/L 64   TOTAL PROTEIN g/dL 7.0   ALBUMIN g/dL 4.7   TOTAL BILIRUBIN mg/dL 0.50     Results from last 7 days   Lab Units 09/22/24  2115   POC GLUCOSE mg/dl 120     Results from last 7 days   Lab Units 09/22/24  2123   GLUCOSE RANDOM mg/dL 103         Results from last 7 days   Lab Units 09/23/24  0534   HEMOGLOBIN A1C % 5.4   EAG mg/dl 108       Results from last 7 days   Lab Units 09/22/24  2123   PROTIME seconds 14.3   INR  1.06   PTT seconds 27       ED Treatment-Medication Administration from 09/22/2024 2104 to 09/23/2024 0049         Date/Time Order Dose Route Action     09/22/2024 2131 aspirin tablet 325 mg 325 mg Oral Given     09/22/2024 2259 iohexol (OMNIPAQUE) 350 MG/ML injection (MULTI-DOSE) 100 mL 100 mL Intravenous Given            Past Medical History:   Diagnosis Date    Anxiety disorder     Bipolar 1 disorder (HCC)     Diverticulitis     Hypertension     PTSD (post-traumatic stress disorder)      Present on Admission:   Facial droop   Anxiety and depression   Tobacco abuse   Cervical radiculitis   Tardive dyskinesia      Admitting Diagnosis: Facial droop [R29.810]  Age/Sex: 34 y.o. female  Admission Orders:  Scheduled Medications:  aspirin, 81 mg, Oral, Daily  atorvastatin, 40 mg, Per NG Tube, QPM  enoxaparin, 40 mg, Subcutaneous, Daily  gabapentin, 600 mg, Oral, HS  HYDROcodone-acetaminophen, 1 tablet, Oral, HS  Lumateperone, 42 mg, Oral, Daily  nicotine, 1 patch, Transdermal, Daily  pantoprazole, 40 mg, Oral, BID AC  rOPINIRole, 0.5 mg, Oral, BID  tiZANidine, 4 mg, Oral, HS      Continuous IV Infusions:  sodium chloride, 100 mL/hr, Intravenous, Continuous      PRN Meds:  acetaminophen, 650 mg, Oral, Q8H PRN        IP CONSULT TO NEUROLOGY  IP CONSULT TO CASE MANAGEMENT  IP CONSULT TO NUTRITION SERVICES    Network Utilization Review Department  ATTENTION: Please call with any questions or  concerns to [phone] and carefully listen to the prompts so that you are directed to the right person. All voicemails are confidential.   For Discharge needs, contact Care Management DC Support Team at [phone] opt. 2  Send all requests for admission clinical reviews, approved or denied determinations and any other requests to dedicated fax number below belonging to the [facility] where the patient is receiving treatment. List of dedicated fax numbers for the Facilities:  FACILITY NAME UR FAX NUMBER   ADMISSION DENIALS (Administrative/Medical Necessity) [phone]   DISCHARGE SUPPORT TEAM (NETWORK) [phone]   PARENT CHILD HEALTH (Maternity/NICU/Pediatrics) [phone]   [facility] [phone]   [facility] [phone]   [facility] [phone]   [facility] [phone]   [facility] [phone]   [facility] [phone]   [facility] [phone]   [facility] [phone]   [facility] [phone]   [facility] [phone]   [facility] [phone]   [facility] [phone]

## 2024-09-23 NOTE — CONSULTS
Consultation - Neurology   Name: Sandi Gaston 34 y.o. female I MRN: [id number]  Unit/Bed#: 4 [address] I Date of Admission: 9/22/2024   Date of Service: 9/23/2024 I Hospital Day: 0   Inpatient consult to Neurology  Consult performed by: Renae Causey PA-C  Consult ordered by: KELSIE Renee        Physician Requesting Evaluation: Kalee Valdez MD   Reason for Evaluation / Principal Problem: Facial droop    Assessment & Plan  Facial droop  35 yo female with anxiety, depression, bipolar disorder, recent C4-5 ACDF at [facility] in July 2024, who presented to the hospital after an episode of transient facial droop and dysarthria. Patient's  initially reported R facial droop but actually thinks it was her L face that was drooped. Patient reports associated HA behind her B/L eyes at the time of the symptoms.    Work-up  9/22/2024 CTA head and neck with and without contrast: No acute intracranial abnormality. Unremarkable CTA neck and brain.    Etiology of symptoms unclear at this time, may be secondary to complicated migraine, but given tobacco use and significant family history of stroke, recommend continue on stroke pathway to rule out ischemic infarct.    Plan:   - Continue on acute ischemic stroke pathway.   - MRI brain without contrast pending.   - Echocardiogram with bubble study pending.   - Monitor on telemetry.   - Hemoglobin A1c pending.   - Lipid panel reviewed, total cholesterol 125, triglycerides 77, HDL 23, LDL 87.   - s/p  mg x 1.   - Continue on ASA 81 mg QD.   - Continue on atorvastatin 40 mg QPM.   - Allow for permissive hypertension with BP goal <220/110. Treat with PRN antihypertensives.  - Goal normothermia and euclycemia.   - PT/OT   - Stroke education.   - Monitor exam and notify with changes.    9/23/24 8494 Addendum: MRI brain without contrast reviewed, normal exam. Discussed with attending physician, suspect symptoms secondary to complicated migraine.  Can d/c stroke pathway and d/c ASA and statin. Recommend Toradol 10 mg Q8H PRN, Benadryl 25 mg Q8H PRN and Compazine 10 mg Q8H PRN for migraine headache. 15 tablets of each medication sent to outpatient pharmacy. Patient can follow-up with neurology team on an as needed basis. Will place contact information in discharge instructions. Above plan discussed with primary team as well.  Anxiety and depression  - Management as per primary team.  Tobacco abuse  - Encouraged tobacco cessation.  S/P cervical spinal fusion  - Recent C4-5 ACDF in July 2024.   - Follows with [facility].    Neurology service will follow.  Sandi Gaston will need follow up in as needed with headache attending or advance practitioner. She will not require outpatient neurological testing.    History of Present Illness   HPI: Sandi Gaston is a 34 y.o.  female with anxiety, depression, bipolar disorder, recent C4-5 ACDF at [facility] in July 2024, who presents with transient facial droop and dysarthria.    Patient was at home cleaning up from her daughter's birthday party and her  noted that she had episode of facial droop with dysarthria which lasted for a few minutes and then resolved spontaneously. He initially told staff in ED that it was the right side of her face but later commented that he actually thinks it was the left side of her face. She noted associated headache at the time of the symptoms and reports she frequently has headaches but this headache felt different. She notes that in general when she has a headache, it is generally in the back of her head and associated with her neck pain but this particular headache was behind her eyes and felt like pressure. She notes that it was not severe, but was different than her typical headache. She notes she will occasionally have photophobia with HA. She denies HA currently She denies any associated symptoms at that time and notes that she herself was not  "aware of the facial droop or slurred speech. She notes her  told her she \"sounded drunk.\" She does not think she has had any further recurrence of symptoms since coming to the hospital.     She also notes that the evening prior to this occurring, she had an episode of lightheadedness associated with tinnitus and nausea and notes that she actually fell. She denies LOC and notes that this is not a typical occurrence for her.     She denies history of stroke or TIA but does note that there is a strong family history of this. She notes her mother, grandmother and grandfather all had strokes. She denies any recent illness, fever, chills. She notes that she continues to have B/L UE intermittent numbness and tingling related to her neck but denies any new or worsening symptoms.     Patient denies ETOH and drug use. She does use tobacco and smokes 2ppd. She does not take any anticoagulants or antiplatelets at home.    Review of Systems   Constitutional:  Negative for chills, fatigue and fever.   HENT:  Negative for tinnitus.    Eyes:  Negative for photophobia.   Respiratory:  Negative for shortness of breath.    Cardiovascular:  Negative for chest pain.   Gastrointestinal:  Negative for abdominal pain, nausea and vomiting.   Genitourinary:  Negative for difficulty urinating and dysuria.   Musculoskeletal:  Positive for back pain and neck pain.   Skin:  Negative for rash.   Neurological:  Positive for weakness, numbness and headaches. Negative for dizziness, speech difficulty and light-headedness.   Psychiatric/Behavioral:  Negative for confusion.       I have reviewed the patient's PMH, PSH, Social History, Family History, Meds, and Allergies  Historical Information   Past Medical History:   Diagnosis Date    Anxiety disorder     Bipolar 1 disorder (HCC)     Diverticulitis     Hypertension     PTSD (post-traumatic stress disorder)      Past Surgical History:   Procedure Laterality Date    CHOLECYSTECTOMY      NECK " SURGERY  07/15/2024     Social History     Tobacco Use    Smoking status: Every Day     Current packs/day: 2.00     Types: Cigarettes    Smokeless tobacco: Never   Vaping Use    Vaping status: Never Used   Substance and Sexual Activity    Alcohol use: Never    Drug use: No    Sexual activity: Not on file     E-Cigarette/Vaping    E-Cigarette Use Never User      E-Cigarette/Vaping Substances    Nicotine No     THC No     CBD No     Flavoring No     Other No     Unknown No      History reviewed. No pertinent family history.    Medications  Scheduled Meds:  Current Facility-Administered Medications   Medication Dose Route Frequency Provider Last Rate    acetaminophen  650 mg Oral Q8H PRN Opal A Ujvary, CRNP      aspirin  81 mg Oral Daily Opal A Ujvary, CRNP      atorvastatin  40 mg Per NG Tube QPM Opal A Ujvary, CRNP      enoxaparin  40 mg Subcutaneous Daily Opal A Ujvary, CRNP      gabapentin  600 mg Oral HS Opal A Ujvary, CRNP      HYDROcodone-acetaminophen  1 tablet Oral HS Opal A Ujvary, CRNP      Lumateperone  42 mg Oral Daily Opal A Ujvary, CRNP      nicotine  1 patch Transdermal Daily Opal A Ujvary, CRNP      pantoprazole  40 mg Oral BID AC Opal A Ujvary, CRNP      rOPINIRole  0.5 mg Oral BID Opal A Ujvary, CRNP      sodium chloride  100 mL/hr Intravenous Continuous Opal A Ujvary, CRNP 100 mL/hr (09/23/24 0157)    tiZANidine  4 mg Oral HS Opal A Ujvary, CRNP       Continuous Infusions:sodium chloride, 100 mL/hr, Last Rate: 100 mL/hr (09/23/24 0157)      PRN Meds:.  acetaminophen    Objective      Temp:  [97.9 °F (36.6 °C)-98.5 °F (36.9 °C)] 98 °F (36.7 °C)  HR:  [] 68  Resp:  [17-20] 18  BP: ()/(58-94) 114/71  O2 Device: None (Room air)          No intake/output data recorded.  Lines/Drains/Airways       Active Status       None                  Physical Exam  Constitutional:       General: She is not in acute distress.     Appearance:  Normal appearance. She is not ill-appearing, toxic-appearing or diaphoretic.   HENT:      Head: Normocephalic and atraumatic.      Mouth/Throat:      Mouth: Mucous membranes are moist.      Pharynx: Oropharynx is clear. No oropharyngeal exudate or posterior oropharyngeal erythema.   Eyes:      General: No scleral icterus.        Right eye: No discharge.         Left eye: No discharge.      Extraocular Movements: Extraocular movements intact.      Conjunctiva/sclera: Conjunctivae normal.      Pupils: Pupils are equal, round, and reactive to light.   Cardiovascular:      Rate and Rhythm: Normal rate and regular rhythm.   Pulmonary:      Effort: Pulmonary effort is normal. No respiratory distress.      Breath sounds: Normal breath sounds.   Abdominal:      General: There is no distension.      Palpations: Abdomen is soft.      Tenderness: There is no abdominal tenderness.   Musculoskeletal:         General: Normal range of motion.      Cervical back: Normal range of motion and neck supple.      Right lower leg: No edema.      Left lower leg: No edema.   Skin:     General: Skin is warm and dry.      Findings: No erythema or rash.   Neurological:      Mental Status: She is alert and oriented to person, place, and time.      Coordination: Finger-Nose-Finger Test normal.      Deep Tendon Reflexes:      Reflex Scores:       Bicep reflexes are 2+ on the right side and 2+ on the left side.       Brachioradialis reflexes are 2+ on the right side and 2+ on the left side.       Patellar reflexes are 2+ on the right side and 2+ on the left side.       Achilles reflexes are 2+ on the right side and 2+ on the left side.  Psychiatric:         Mood and Affect: Mood normal.         Speech: Speech normal.         Behavior: Behavior normal.     Neurologic Exam     Mental Status   Oriented to person, place, and time.   Oriented to person.   Oriented to place.   Oriented to time.   Attention: normal. Concentration: normal.   Speech:  speech is normal   Level of consciousness: alert  Knowledge: good.   Able to name object. Able to repeat. Normal comprehension.     Cranial Nerves     CN II   Right visual field deficit: none  Left visual field deficit: none     CN III, IV, VI   Pupils are equal, round, and reactive to light.  Right pupil: Size: 4 mm. Shape: regular. Reactivity: brisk.   Left pupil: Size: 4 mm. Shape: regular. Reactivity: brisk.   Nystagmus: none   Diplopia: none  Ophthalmoparesis: none  Upgaze: normal  Downgaze: normal  Conjugate gaze: present    CN V   Right facial sensation deficit: none  Left facial sensation deficit: none    CN VII   Right facial weakness: none  Left facial weakness: none    CN VIII   Hearing: intact    CN IX, X   Palate: symmetric    CN XII   Tongue: not atrophic  Fasciculations: absent  Tongue deviation: none    Motor Exam   Muscle bulk: normal  Overall muscle tone: normal  Right arm tone: normal  Left arm tone: normal  Right arm pronator drift: absent  Left arm pronator drift: absent  Right leg tone: normal  Left leg tone: normalMotor strength RUE 4+/5, LUE and B/L LE 5/5     Sensory Exam   Right arm light touch: normal  Left arm light touch: normal  Right leg light touch: normal  Left leg light touch: normal    Gait, Coordination, and Reflexes     Gait  Gait: (Deferred for safety)    Coordination   Finger to nose coordination: normal    Tremor   Resting tremor: absent  Intention tremor: absent  Action tremor: absent    Reflexes   Right brachioradialis: 2+  Left brachioradialis: 2+  Right biceps: 2+  Left biceps: 2+  Right patellar: 2+  Left patellar: 2+  Right achilles: 2+  Left achilles: 2+  Right plantar: normal  Left plantar: normal  Right Hamilton: absent  Left Hamilton: absent  Right ankle clonus: absent  Left ankle clonus: absent      Lab Results: I have reviewed the following results:   Recent Results (from the past 24 hour(s))   Fingerstick Glucose (POCT)    Collection Time: 09/22/24  9:15 PM   Result  Value Ref Range    POC Glucose 120 65 - 140 mg/dl   CBC and differential    Collection Time: 09/22/24  9:23 PM   Result Value Ref Range    WBC 10.13 4.31 - 10.16 Thousand/uL    RBC 4.94 3.81 - 5.12 Million/uL    Hemoglobin 15.4 11.5 - 15.4 g/dL    Hematocrit 44.8 34.8 - 46.1 %    MCV 91 82 - 98 fL    MCH 31.2 26.8 - 34.3 pg    MCHC 34.4 31.4 - 37.4 g/dL    RDW 12.2 11.6 - 15.1 %    MPV 10.0 8.9 - 12.7 fL    Platelets 369 149 - 390 Thousands/uL    nRBC 0 /100 WBCs    Segmented % 71 43 - 75 %    Immature Grans % 0 0 - 2 %    Lymphocytes % 22 14 - 44 %    Monocytes % 6 4 - 12 %    Eosinophils Relative 1 0 - 6 %    Basophils Relative 0 0 - 1 %    Absolute Neutrophils 7.09 1.85 - 7.62 Thousands/µL    Absolute Immature Grans 0.02 0.00 - 0.20 Thousand/uL    Absolute Lymphocytes 2.26 0.60 - 4.47 Thousands/µL    Absolute Monocytes 0.65 0.17 - 1.22 Thousand/µL    Eosinophils Absolute 0.08 0.00 - 0.61 Thousand/µL    Basophils Absolute 0.03 0.00 - 0.10 Thousands/µL   Comprehensive metabolic panel    Collection Time: 09/22/24  9:23 PM   Result Value Ref Range    Sodium 137 135 - 147 mmol/L    Potassium 3.4 (L) 3.5 - 5.3 mmol/L    Chloride 102 96 - 108 mmol/L    CO2 24 21 - 32 mmol/L    ANION GAP 11 4 - 13 mmol/L    BUN 2 (L) 5 - 25 mg/dL    Creatinine 0.80 0.60 - 1.30 mg/dL    Glucose 103 65 - 140 mg/dL    Calcium 9.1 8.4 - 10.2 mg/dL    AST 27 13 - 39 U/L    ALT 22 7 - 52 U/L    Alkaline Phosphatase 64 34 - 104 U/L    Total Protein 7.0 6.4 - 8.4 g/dL    Albumin 4.7 3.5 - 5.0 g/dL    Total Bilirubin 0.50 0.20 - 1.00 mg/dL    eGFR 97 ml/min/1.73sq m   Protime-INR    Collection Time: 09/22/24  9:23 PM   Result Value Ref Range    Protime 14.3 12.3 - 15.0 seconds    INR 1.06 0.85 - 1.19   APTT    Collection Time: 09/22/24  9:23 PM   Result Value Ref Range    PTT 27 23 - 34 seconds   ECG 12 lead    Collection Time: 09/22/24  9:23 PM   Result Value Ref Range    Ventricular Rate 93 BPM    Atrial Rate 93 BPM    TN Interval 132 ms     QRSD Interval 88 ms    QT Interval 366 ms    QTC Interval 455 ms    P Axis 36 degrees    QRS Axis -4 degrees    T Wave Axis -16 degrees   Lipid Panel with Direct LDL reflex    Collection Time: 09/23/24  5:34 AM   Result Value Ref Range    Cholesterol 125 See Comment mg/dL    Triglycerides 77 See Comment mg/dL    HDL, Direct 23 (L) >=50 mg/dL    LDL Calculated 87 0 - 100 mg/dL       Imaging Review: Personally reviewed the following image studies in PACS and associated radiology reports: CTA head and neck with and without contrast: No acute intracranial abnormality. Unremarkable CTA neck and brain.     VTE Prophylaxis: Enoxaparin (Lovenox)

## 2024-09-23 NOTE — DISCHARGE SUMMARY
Discharge Summary - Hospitalist   Name: Sandi Gaston 34 y.o. female I MRN: [id number]  Unit/Bed#: [address] [place] [address] I Date of Admission: 9/22/2024   Date of Service: 9/23/2024 I Hospital Day: 0     Assessment & Plan  Complicated migraine  Presented with right facial droop, blurry vision, and dysarthria while cutting her 's hair at 7:30 PM.  About 2 nights ago she was lightheaded and nauseated with ringing in ear  Patient states she has a history of anxiety and thought her symptoms are for anxiety. She states that she did have recent surgery to her neck due to herniated disks in July at [facility]  Patient symptoms resolved on presentation to the ED,  NIH is 0 in the ED  In ED received 325 mg of aspirin  CT Brain:  No acute intracranial abnormality.  CT Angiography:  Unremarkable CTA neck and brain.  MRI negative for CVA  echo -normal EF, normal diastolic function and no PFO  Was on aspirin 81 mg and Lipitor 40 mg daily, no indication for use on discharge  Lipid panel - LDL 87 & A1c - 5.4  Toradol + Benadryl + Compazine as needed on discharge for migraine headache     Anxiety and depression  Mood stable  Continue Caplyta   Tobacco abuse  Patient patient smokes 2 packs of cigarettes per day  Smoking cessation  Nicotine patich  Cervical radiculitis   S/p 7/15/24: C4-C5 total disc replacement with cervical decompression  [facility].  Home medication PD MP reviewed by Vicodin continued   Tardive dyskinesia  Chronic  Follows with [facility] neurology.     Medical Problems       Resolved Problems  Date Reviewed: 9/23/2024   None       Discharging Physician / Practitioner: Laure Madrid DO  PCP: Nii Deluna MD  Admission Date:   Admission Orders (From admission, onward)       Ordered        09/22/24 2574  INPATIENT ADMISSION  Once                          Discharge Date: 09/23/24    Consultations During Hospital Stay:  neurology    Procedures Performed:  "  TTE    Significant Findings / Test Results:   See above    Incidental Findings:   none     Test Results Pending at Discharge (will require follow up):   none     Outpatient Tests Requested:  none    Complications:  none    Reason for Admission: Facial droop    Hospital Course:   Sandi Gaston is a 34 y.o. female patient who originally presented to the hospital on 9/22/2024 due to Right facial droop, dysarthria and blurry vision while cutting her 's hair.  She recently had surgery in July for her neck.  Patient was admitted and underwent neurological workup which was negative.  Patient seen by neurology and symptoms felt to be due to complicated migraine.  Cleared by neurology prior to discharge.  Discharge plan discussed with patient    Please see above list of diagnoses and related plan for additional information.     Condition at Discharge: stable    Discharge Day Visit / Exam:   Subjective: Denies any symptoms. Feels ready for discharge  Vitals: Blood Pressure: 113/80 (09/23/24 1330)  Pulse: 78 (09/23/24 1330)  Temperature: 97.8 °F (36.6 °C) (09/23/24 1509)  Temp Source: Oral (09/23/24 0053)  Respirations: 18 (09/23/24 1509)  Height: 5' 5\" (165.1 cm) (09/23/24 1000)  Weight - Scale: 83.5 kg (184 lb) (09/23/24 1000)  SpO2: 99 % (09/23/24 1330)  Exam:   Physical Exam  Vitals reviewed.   Constitutional:       General: She is not in acute distress.     Appearance: She is not toxic-appearing.   HENT:      Head: Normocephalic and atraumatic.   Eyes:      Conjunctiva/sclera: Conjunctivae normal.   Cardiovascular:      Rate and Rhythm: Normal rate and regular rhythm.   Pulmonary:      Effort: Pulmonary effort is normal. No respiratory distress.      Breath sounds: Normal breath sounds. No wheezing or rales.   Abdominal:      General: Bowel sounds are normal. There is no distension.      Palpations: Abdomen is soft.      Tenderness: There is no abdominal tenderness.   Musculoskeletal:      Right lower leg: " No edema.      Left lower leg: No edema.   Neurological:      Mental Status: She is alert and oriented to person, place, and time.      Cranial Nerves: No cranial nerve deficit.      Motor: No weakness.            Discharge instructions/Information to patient and family:   See after visit summary for information provided to patient and family.      Provisions for Follow-Up Care:  See after visit summary for information related to follow-up care and any pertinent home health orders.      Mobility at time of Discharge:   Basic Mobility Inpatient Raw Score: 24  JH-HLM Goal: 8: Walk 250 feet or more  JH-HLM Achieved: 7: Walk 25 feet or more  HLM Goal NOT achieved. Continue to encourage mobility in post discharge setting.     Disposition:   Home    Planned Readmission: no    Discharge Medications:  See after visit summary for reconciled discharge medications provided to patient and/or family.      Administrative Statements   Discharge Statement:  I have spent a total time of < 30 minutes in caring for this patient on the day of the visit/encounter..    **Please Note: This note may have been constructed using a voice recognition system**

## 2024-09-23 NOTE — ASSESSMENT & PLAN NOTE
S/p 7/15/24: C4-C5 total disc replacement with cervical decompression  [facility]  Home medication PD MP reviewed by Vicodin continued

## 2024-09-23 NOTE — ASSESSMENT & PLAN NOTE
33 yo female with anxiety, depression, bipolar disorder, recent C4-5 ACDF at [facility] in July 2024, who presented to the hospital after an episode of transient facial droop and dysarthria. Patient's  initially reported R facial droop but actually thinks it was her L face that was drooped. Patient reports associated HA behind her B/L eyes at the time of the symptoms.    Work-up  9/22/2024 CTA head and neck with and without contrast: No acute intracranial abnormality. Unremarkable CTA neck and brain.    Etiology of symptoms unclear at this time, may be secondary to complicated migraine, but given tobacco use and significant family history of stroke, recommend continue on stroke pathway to rule out ischemic infarct.    Plan:   - Continue on acute ischemic stroke pathway.   - MRI brain without contrast pending.   - Echocardiogram with bubble study pending.   - Monitor on telemetry.   - Hemoglobin A1c pending.   - Lipid panel reviewed, total cholesterol 125, triglycerides 77, HDL 23, LDL 87.   - s/p  mg x 1.   - Continue on ASA 81 mg QD.   - Continue on atorvastatin 40 mg QPM.   - Allow for permissive hypertension with BP goal <220/110. Treat with PRN antihypertensives.  - Goal normothermia and euclycemia.   - PT/OT   - Stroke education.   - Monitor exam and notify with changes.    9/23/24 1524 Addendum: MRI brain without contrast reviewed, normal exam. Discussed with attending physician, suspect symptoms secondary to complicated migraine. Can d/c stroke pathway and d/c ASA and statin. Recommend Toradol 10 mg Q8H PRN, Benadryl 25 mg Q8H PRN and Compazine 10 mg Q8H PRN for migraine headache. 15 tablets of each medication sent to outpatient pharmacy. Patient can follow-up with neurology team on an as needed basis. Will place contact information in discharge instructions. Above plan discussed with primary team as well.

## 2024-09-23 NOTE — PHYSICAL THERAPY NOTE
PT SCREEN       09/23/24 1224   PT Last Visit   PT Visit Date 09/23/24   Note Type   Note type Screen   Additional Comments PT order received, chart reviewed. Spoke to OT, Ryanne who states that pt is independent with all functional mobility and is at her baseline.  Pt has no skilled PT needs for this admission.  Will discharge pt from PT services.  Please re-order PT if there is any change in status that would require PT services.   Licensure   [place] License Number  Ruby Hastings PT  [id number]

## 2024-09-25 ENCOUNTER — EVALUATION (OUTPATIENT)
Dept: PHYSICAL THERAPY | Facility: CLINIC | Age: 34
End: 2024-09-25
Payer: COMMERCIAL

## 2024-09-25 DIAGNOSIS — M54.12 CERVICAL RADICULITIS: Primary | ICD-10-CM

## 2024-09-25 DIAGNOSIS — M54.2 CERVICALGIA: ICD-10-CM

## 2024-09-25 PROBLEM — G43.109 COMPLICATED MIGRAINE: Status: ACTIVE | Noted: 2024-09-23

## 2024-09-25 PROCEDURE — 97110 THERAPEUTIC EXERCISES: CPT | Performed by: PHYSICAL THERAPIST

## 2024-09-25 PROCEDURE — 97112 NEUROMUSCULAR REEDUCATION: CPT | Performed by: PHYSICAL THERAPIST

## 2024-09-25 NOTE — PROGRESS NOTES
PT Re-Evaluation     Today's date: 2024  Patient name: Sandi Gaston  : 1990  MRN: [id number]  Referring provider: Salari, Behnam, DO  Dx:   Encounter Diagnosis     ICD-10-CM    1. Cervical radiculitis  M54.12       2. Cervicalgia  M54.2                      Assessment  Impairments: abnormal or restricted ROM, activity intolerance, impaired physical strength and pain with function    Assessment details: Patient is showing steady progress of cervical ROM, however, she continues with significant pain and limited lifting ability.  She has had a possible TIA, that has delayed PT rehab for further neurological evaluation.  She would benefit with continued PT after neurological clearance to restore prior level of function.    Goals  STG  Initiate HEP  Decrease pain by 50% in 3 weeks  Patient performing HEP 50% of time in 3 weeks  LTG  Independent with HEP  Decrease pain by 90% in 6 weeks  Patient performing HEP 90% of time in 6 weeks  FOTO > 65    in 6 weeks     Plan    Planned therapy interventions: joint mobilization, manual therapy, neuromuscular re-education, patient/caregiver education, postural training, strengthening, stretching, therapeutic exercise and home exercise program    Frequency: 2x week  Duration in weeks: 6  Treatment plan discussed with: patient        Subjective Evaluation    History of Present Illness  Mechanism of injury: I was hospitalized with a possible TIA on 24.  Unclear if a TIA occurred, to see a neurologist for follow-up next week.  Currently, I am having less left sided neck pain, but increased right sided neck pain. My neck mobility is slightly improved.  I am seeing the Dr Ridley on 10/6/24.          Recurrent probem    Patient Goals  Patient goals for therapy: decreased pain, increased motion, increased strength, independence with ADLs/IADLs and return to sport/leisure activities    Pain  Current pain rating: 3  At best pain rating: 3  At worst pain rating:  6  Location: right > left cervical spine  Quality: dull ache, discomfort and knife-like  Relieving factors: rest  Aggravating factors: overhead activity and lifting    Treatments  Current treatment: physical therapy        Objective     Palpation   Left   Tenderness of the levator scapulae, suboccipitals and upper trapezius.     Right   Tenderness of the levator scapulae, suboccipitals and upper trapezius.     Active Range of Motion   Cervical/Thoracic Spine       Cervical    Flexion: 45 degrees  with pain  Extension: 25 degrees     with pain  Left rotation: 60 degrees  Right rotation: 70 degrees                Precautions: C4-5 discectomy 7/15/24      Manuals                                                                 Neuro Re-Ed                                                                                                        Ther Ex             Scap Y,T, W  RTB 3x20            Pectoralis Stretch 3x10                                                                                          Ther Activity                                       Gait Training                                       Modalities

## 2024-09-27 ENCOUNTER — APPOINTMENT (OUTPATIENT)
Dept: PHYSICAL THERAPY | Facility: CLINIC | Age: 34
End: 2024-09-27
Payer: COMMERCIAL

## 2025-04-06 ENCOUNTER — HOSPITAL ENCOUNTER (EMERGENCY)
Facility: HOSPITAL | Age: 35
Discharge: HOME | End: 2025-04-06
Attending: EMERGENCY MEDICINE
Payer: COMMERCIAL

## 2025-04-06 VITALS
SYSTOLIC BLOOD PRESSURE: 147 MMHG | OXYGEN SATURATION: 99 % | TEMPERATURE: 99.1 F | HEART RATE: 75 BPM | DIASTOLIC BLOOD PRESSURE: 82 MMHG | RESPIRATION RATE: 18 BRPM | BODY MASS INDEX: 32.95 KG/M2 | WEIGHT: 198 LBS

## 2025-04-06 DIAGNOSIS — B37.0 ORAL THRUSH: Primary | ICD-10-CM

## 2025-04-06 PROCEDURE — 99282 EMERGENCY DEPT VISIT SF MDM: CPT

## 2025-04-06 PROCEDURE — 99284 EMERGENCY DEPT VISIT MOD MDM: CPT | Performed by: EMERGENCY MEDICINE

## 2025-04-06 RX ORDER — NYSTATIN 100000 [USP'U]/ML
500000 SUSPENSION ORAL 4 TIMES DAILY
Qty: 140 ML | Refills: 0 | Status: SHIPPED | OUTPATIENT
Start: 2025-04-06 | End: 2025-04-13

## 2025-04-06 RX ORDER — NYSTATIN 100000 [USP'U]/ML
500000 SUSPENSION ORAL ONCE
Status: COMPLETED | OUTPATIENT
Start: 2025-04-06 | End: 2025-04-06

## 2025-04-06 RX ADMIN — NYSTATIN 500000 UNITS: 100000 SUSPENSION ORAL at 20:11

## 2025-04-08 NOTE — ED PROVIDER NOTES
Time reflects when diagnosis was documented in both MDM as applicable and the Disposition within this note       Time User Action Codes Description Comment    4/6/2025  8:02 PM Martin Ivan Add [B37.0] Oral thrush           ED Disposition       ED Disposition   Discharge    Condition   Stable    Date/Time   Sun Apr 6, 2025  8:02 PM    Comment   Sandi Gaston discharge to home/self care.                   Assessment & Plan       Medical Decision Making  Pulse ox 99% on room air indicating adequate oxygenation.            Risk  Prescription drug management.             Medications   nystatin (MYCOSTATIN) oral suspension 500,000 Units (500,000 Units Swish & Swallow Given 4/6/25 2011)       ED Risk Strat Scores                    No data recorded        SBIRT 20yo+      Flowsheet Row Most Recent Value   Initial Alcohol Screen: US AUDIT-C     1. How often do you have a drink containing alcohol? 0 Filed at: 04/06/2025 2002   2. How many drinks containing alcohol do you have on a typical day you are drinking?  0 Filed at: 04/06/2025 2002   3a. Male UNDER 65: How often do you have five or more drinks on one occasion? 0 Filed at: 04/06/2025 2002   3b. FEMALE Any Age, or MALE 65+: How often do you have 4 or more drinks on one occassion? 0 Filed at: 04/06/2025 2002   Audit-C Score 0 Filed at: 04/06/2025 2002   NIKOLAI: How many times in the past year have you...    Used an illegal drug or used a prescription medication for non-medical reasons? Never Filed at: 04/06/2025 2002                            History of Present Illness       Chief Complaint   Patient presents with    Oral Pain     Patient had neck surgery on 3/31, collar in place. Started past few days with burning oral pain tongue and mouth. Tongue with white coating        Past Medical History:   Diagnosis Date    Anxiety disorder     Bipolar 1 disorder (HCC)     Diverticulitis     Hypertension     PTSD (post-traumatic stress disorder)       Past Surgical  History:   Procedure Laterality Date    CHOLECYSTECTOMY      NECK SURGERY  07/15/2024      History reviewed. No pertinent family history.   Social History     Tobacco Use    Smoking status: Former     Current packs/day: 2.00     Types: Cigarettes    Smokeless tobacco: Never   Vaping Use    Vaping status: Never Used   Substance Use Topics    Alcohol use: Never    Drug use: No      E-Cigarette/Vaping    E-Cigarette Use Never User       E-Cigarette/Vaping Substances    Nicotine No     THC No     CBD No     Flavoring No     Other No     Unknown No       I have reviewed and agree with the history as documented.     Patient presents evaluation of tongue and throat pain.  Patient had neck surgery done on 331 and still has a collar in place.  For past few days she has had burning on her tongue as well as a white coating.      History provided by:  Patient   used: No    Oral Pain      Review of Systems   All other systems reviewed and are negative.          Objective       ED Triage Vitals [04/06/25 1949]   Temperature Pulse Blood Pressure Respirations SpO2 Patient Position - Orthostatic VS   99.1 °F (37.3 °C) 75 147/82 18 99 % Sitting      Temp Source Heart Rate Source BP Location FiO2 (%) Pain Score    Tympanic Monitor Left arm -- 5      Vitals      Date and Time Temp Pulse SpO2 Resp BP Pain Score FACES Pain Rating User   04/06/25 1949 99.1 °F (37.3 °C) 75 99 % 18 147/82 5 -- SW            Physical Exam  Vitals and nursing note reviewed.   Constitutional:       General: She is not in acute distress.  HENT:      Mouth/Throat:     Cardiovascular:      Rate and Rhythm: Normal rate and regular rhythm.   Pulmonary:      Effort: Pulmonary effort is normal. No respiratory distress.      Breath sounds: Normal breath sounds.   Neurological:      General: No focal deficit present.      Mental Status: She is alert and oriented to person, place, and time.         Results Reviewed       None            No orders to  display       Procedures    ED Medication and Procedure Management   Prior to Admission Medications   Prescriptions Last Dose Informant Patient Reported? Taking?   Ergocalciferol (DRISDOL PO)   Yes No   Sig: Take by mouth   HYDROcodone-acetaminophen (NORCO) 5-325 mg per tablet   Yes No   Sig: Take 1 tablet by mouth daily at bedtime   Lumateperone (Caplyta) 42 MG CAPS capsule   Yes No   Sig: Take 42 mg by mouth daily   TiZANidine (ZANAFLEX) 4 MG capsule   Yes No   Sig: Take 4 mg by mouth daily at bedtime   desvenlafaxine (PRISTIQ) 100 mg 24 hr tablet  Self Yes No   Sig: Take 25 mg by mouth daily   diphenhydrAMINE (BENADRYL) 25 mg tablet   No No   Sig: Take 1 tablet (25 mg total) by mouth every 8 (eight) hours as needed (migraine headache)   gabapentin (NEURONTIN) 400 mg capsule   Yes No   Sig: Take 600 mg by mouth daily at bedtime   ketorolac (TORADOL) 10 mg tablet   No No   Sig: Take 1 tablet (10 mg total) by mouth every 8 (eight) hours as needed (Migraine headache)   methylprednisolone (MEDROL) 4 mg tablet   Yes No   Sig: Take 4 mg by mouth Blister pack   nicotine (NICODERM CQ) 21 mg/24 hr TD 24 hr patch   No No   Sig: Place 1 patch on the skin over 24 hours daily   pantoprazole (PROTONIX) 40 mg tablet   Yes No   Sig: Take 40 mg by mouth 2 (two) times a day   prochlorperazine (COMPAZINE) 10 mg tablet   No No   Sig: Take 1 tablet (10 mg total) by mouth every 8 (eight) hours as needed for nausea or vomiting (migraine headache)   rOPINIRole (REQUIP) 0.5 mg tablet   Yes No   Sig: Take 0.5 mg by mouth 2 (two) times a day      Facility-Administered Medications: None     Discharge Medication List as of 4/6/2025  8:03 PM        START taking these medications    Details   nystatin (MYCOSTATIN) 500,000 units/5 mL suspension Take 5 mL (500,000 Units total) by mouth 4 (four) times a day for 7 days, Starting Sun 4/6/2025, Until Sun 4/13/2025, Normal           CONTINUE these medications which have NOT CHANGED    Details    desvenlafaxine (PRISTIQ) 100 mg 24 hr tablet Take 25 mg by mouth daily, Historical Med      diphenhydrAMINE (BENADRYL) 25 mg tablet Take 1 tablet (25 mg total) by mouth every 8 (eight) hours as needed (migraine headache), Starting Mon 9/23/2024, Normal      Ergocalciferol (DRISDOL PO) Take by mouth, Historical Med      gabapentin (NEURONTIN) 400 mg capsule Take 600 mg by mouth daily at bedtime, Historical Med      HYDROcodone-acetaminophen (NORCO) 5-325 mg per tablet Take 1 tablet by mouth daily at bedtime, Historical Med      ketorolac (TORADOL) 10 mg tablet Take 1 tablet (10 mg total) by mouth every 8 (eight) hours as needed (Migraine headache), Starting Mon 9/23/2024, Normal      Lumateperone (Caplyta) 42 MG CAPS capsule Take 42 mg by mouth daily, Historical Med      methylprednisolone (MEDROL) 4 mg tablet Take 4 mg by mouth Blister pack, Historical Med      nicotine (NICODERM CQ) 21 mg/24 hr TD 24 hr patch Place 1 patch on the skin over 24 hours daily, Starting Mon 9/23/2024, Normal      pantoprazole (PROTONIX) 40 mg tablet Take 40 mg by mouth 2 (two) times a day, Historical Med      prochlorperazine (COMPAZINE) 10 mg tablet Take 1 tablet (10 mg total) by mouth every 8 (eight) hours as needed for nausea or vomiting (migraine headache), Starting Mon 9/23/2024, Normal      rOPINIRole (REQUIP) 0.5 mg tablet Take 0.5 mg by mouth 2 (two) times a day, Historical Med      TiZANidine (ZANAFLEX) 4 MG capsule Take 4 mg by mouth daily at bedtime, Historical Med           No discharge procedures on file.  ED SEPSIS DOCUMENTATION   Time reflects when diagnosis was documented in both MDM as applicable and the Disposition within this note       Time User Action Codes Description Comment    4/6/2025  8:02 PM Martin Ivan Add [B37.0] Oral thrush                  Martin Ivan,   04/08/25 2774